# Patient Record
Sex: MALE | Race: WHITE | NOT HISPANIC OR LATINO | Employment: OTHER | URBAN - METROPOLITAN AREA
[De-identification: names, ages, dates, MRNs, and addresses within clinical notes are randomized per-mention and may not be internally consistent; named-entity substitution may affect disease eponyms.]

---

## 2019-08-26 PROBLEM — I10 ESSENTIAL HYPERTENSION: Status: ACTIVE | Noted: 2019-08-26

## 2019-08-26 PROBLEM — E78.5 HLD (HYPERLIPIDEMIA): Status: ACTIVE | Noted: 2019-08-26

## 2019-08-26 PROBLEM — K81.1 CHRONIC CHOLECYSTITIS: Status: ACTIVE | Noted: 2019-08-26

## 2019-08-26 PROBLEM — J44.9 COPD (CHRONIC OBSTRUCTIVE PULMONARY DISEASE) (HCC): Status: ACTIVE | Noted: 2019-08-26

## 2023-03-13 ENCOUNTER — TELEPHONE (OUTPATIENT)
Dept: PRIMARY CARE | Facility: CLINIC | Age: 73
End: 2023-03-13
Payer: MEDICARE

## 2023-03-13 DIAGNOSIS — K21.9 GASTROESOPHAGEAL REFLUX DISEASE, UNSPECIFIED WHETHER ESOPHAGITIS PRESENT: ICD-10-CM

## 2023-03-13 RX ORDER — FAMOTIDINE 20 MG/1
20 TABLET, FILM COATED ORAL NIGHTLY
COMMUNITY
Start: 2022-12-13 | End: 2023-03-13 | Stop reason: SDUPTHER

## 2023-03-13 NOTE — TELEPHONE ENCOUNTER
Patient's wife called, left message, requesting refill for Famotidine 20 mg be sent to [facility] on [address] in [place].

## 2023-03-16 RX ORDER — FAMOTIDINE 20 MG/1
20 TABLET, FILM COATED ORAL NIGHTLY
Qty: 90 TABLET | Refills: 1 | Status: SHIPPED | OUTPATIENT
Start: 2023-03-16 | End: 2023-03-17 | Stop reason: SDUPTHER

## 2023-03-17 RX ORDER — FAMOTIDINE 20 MG/1
20 TABLET, FILM COATED ORAL NIGHTLY
Qty: 90 TABLET | Refills: 1 | Status: SHIPPED | OUTPATIENT
Start: 2023-03-17 | End: 2023-06-21 | Stop reason: ALTCHOICE

## 2023-03-24 PROBLEM — I49.9 ARRHYTHMIA: Status: ACTIVE | Noted: 2023-03-24

## 2023-03-24 PROBLEM — N52.9 ERECTILE DYSFUNCTION: Status: ACTIVE | Noted: 2023-03-24

## 2023-03-24 PROBLEM — R35.0 INCREASED FREQUENCY OF URINATION: Status: ACTIVE | Noted: 2023-03-24

## 2023-03-24 PROBLEM — F32.9 REACTIVE DEPRESSION: Status: ACTIVE | Noted: 2023-03-24

## 2023-03-24 PROBLEM — I63.9 ACUTE CVA (CEREBROVASCULAR ACCIDENT) (MULTI): Status: ACTIVE | Noted: 2023-03-24

## 2023-03-24 PROBLEM — I95.2 HYPOTENSION DUE TO DRUGS: Status: ACTIVE | Noted: 2023-03-24

## 2023-03-24 PROBLEM — R79.89 LOW VITAMIN D LEVEL: Status: ACTIVE | Noted: 2023-03-24

## 2023-03-24 PROBLEM — J34.2 DEVIATED SEPTUM: Status: ACTIVE | Noted: 2023-03-24

## 2023-03-24 PROBLEM — N18.30 CKD (CHRONIC KIDNEY DISEASE), STAGE III (MULTI): Status: ACTIVE | Noted: 2023-03-24

## 2023-03-24 PROBLEM — H90.3 SENSORINEURAL HEARING LOSS (SNHL) OF BOTH EARS: Status: ACTIVE | Noted: 2023-03-24

## 2023-03-24 PROBLEM — F06.8 ANXIETY DISORDER DUE TO MULTIPLE MEDICAL PROBLEMS: Status: ACTIVE | Noted: 2023-03-24

## 2023-03-24 PROBLEM — R91.1 LUNG NODULE: Status: ACTIVE | Noted: 2023-03-24

## 2023-03-24 PROBLEM — R97.20 ABNORMAL PSA: Status: ACTIVE | Noted: 2023-03-24

## 2023-03-24 PROBLEM — N28.9 RENAL INSUFFICIENCY: Status: ACTIVE | Noted: 2023-03-24

## 2023-03-24 PROBLEM — E29.1 TESTICULAR HYPOFUNCTION: Status: ACTIVE | Noted: 2023-03-24

## 2023-03-24 PROBLEM — E78.5 HYPERLIPIDEMIA: Status: ACTIVE | Noted: 2023-03-24

## 2023-03-24 PROBLEM — N40.0 BENIGN PROSTATIC HYPERPLASIA: Status: ACTIVE | Noted: 2023-03-24

## 2023-03-24 PROBLEM — E55.9 VITAMIN D DEFICIENCY: Status: ACTIVE | Noted: 2023-03-24

## 2023-03-24 PROBLEM — J32.4 CHRONIC PANSINUSITIS: Status: ACTIVE | Noted: 2023-03-24

## 2023-03-24 PROBLEM — I49.3 VENTRICULAR PREMATURE DEPOLARIZATION: Status: ACTIVE | Noted: 2023-03-24

## 2023-03-24 PROBLEM — R00.2 PALPITATIONS: Status: ACTIVE | Noted: 2023-03-24

## 2023-03-24 PROBLEM — I25.10 CAD (CORONARY ARTERY DISEASE): Status: ACTIVE | Noted: 2023-03-24

## 2023-03-24 PROBLEM — I71.40 ABDOMINAL AORTIC ANEURYSM (AAA) WITHOUT RUPTURE (CMS-HCC): Status: ACTIVE | Noted: 2023-03-24

## 2023-03-24 PROBLEM — K21.9 GERD (GASTROESOPHAGEAL REFLUX DISEASE): Status: ACTIVE | Noted: 2023-03-24

## 2023-03-24 PROBLEM — I10 ESSENTIAL HYPERTENSION: Status: ACTIVE | Noted: 2023-03-24

## 2023-03-24 PROBLEM — R06.00 DYSPNEA: Status: ACTIVE | Noted: 2023-03-24

## 2023-03-24 PROBLEM — L30.1 ECZEMA, DYSHIDROTIC: Status: ACTIVE | Noted: 2023-03-24

## 2023-03-24 PROBLEM — E03.9 HYPOTHYROIDISM: Status: ACTIVE | Noted: 2023-03-24

## 2023-03-24 PROBLEM — J44.9 COPD (CHRONIC OBSTRUCTIVE PULMONARY DISEASE) (MULTI): Status: ACTIVE | Noted: 2023-03-24

## 2023-03-24 PROBLEM — C44.621 SQUAMOUS CELL CARCINOMA OF UPPER EXTREMITY: Status: ACTIVE | Noted: 2023-03-24

## 2023-04-04 LAB
ALANINE AMINOTRANSFERASE (SGPT) (U/L) IN SER/PLAS: 23 U/L (ref 10–52)
ALBUMIN (G/DL) IN SER/PLAS: 4.2 G/DL (ref 3.4–5)
ALKALINE PHOSPHATASE (U/L) IN SER/PLAS: 53 U/L (ref 33–136)
ANION GAP IN SER/PLAS: 14 MMOL/L (ref 10–20)
ASPARTATE AMINOTRANSFERASE (SGOT) (U/L) IN SER/PLAS: 20 U/L (ref 9–39)
BASOPHILS (10*3/UL) IN BLOOD BY AUTOMATED COUNT: 0.1 X10E9/L (ref 0–0.1)
BASOPHILS/100 LEUKOCYTES IN BLOOD BY AUTOMATED COUNT: 1.4 % (ref 0–2)
BILIRUBIN TOTAL (MG/DL) IN SER/PLAS: 0.7 MG/DL (ref 0–1.2)
CALCIDIOL (25 OH VITAMIN D3) (NG/ML) IN SER/PLAS: 56 NG/ML
CALCIUM (MG/DL) IN SER/PLAS: 9.2 MG/DL (ref 8.6–10.3)
CARBON DIOXIDE, TOTAL (MMOL/L) IN SER/PLAS: 25 MMOL/L (ref 21–32)
CHLORIDE (MMOL/L) IN SER/PLAS: 105 MMOL/L (ref 98–107)
CHOLESTEROL (MG/DL) IN SER/PLAS: 140 MG/DL (ref 0–199)
CHOLESTEROL IN HDL (MG/DL) IN SER/PLAS: 53.1 MG/DL
CHOLESTEROL/HDL RATIO: 2.6
CREATININE (MG/DL) IN SER/PLAS: 1.37 MG/DL (ref 0.5–1.3)
EOSINOPHILS (10*3/UL) IN BLOOD BY AUTOMATED COUNT: 0.42 X10E9/L (ref 0–0.4)
EOSINOPHILS/100 LEUKOCYTES IN BLOOD BY AUTOMATED COUNT: 5.8 % (ref 0–6)
ERYTHROCYTE DISTRIBUTION WIDTH (RATIO) BY AUTOMATED COUNT: 15.2 % (ref 11.5–14.5)
ERYTHROCYTE MEAN CORPUSCULAR HEMOGLOBIN CONCENTRATION (G/DL) BY AUTOMATED: 30.8 G/DL (ref 32–36)
ERYTHROCYTE MEAN CORPUSCULAR VOLUME (FL) BY AUTOMATED COUNT: 92 FL (ref 80–100)
ERYTHROCYTES (10*6/UL) IN BLOOD BY AUTOMATED COUNT: 4.97 X10E12/L (ref 4.5–5.9)
GFR MALE: 54 ML/MIN/1.73M2
GLUCOSE (MG/DL) IN SER/PLAS: 97 MG/DL (ref 74–99)
HEMATOCRIT (%) IN BLOOD BY AUTOMATED COUNT: 45.5 % (ref 41–52)
HEMOGLOBIN (G/DL) IN BLOOD: 14 G/DL (ref 13.5–17.5)
IMMATURE GRANULOCYTES/100 LEUKOCYTES IN BLOOD BY AUTOMATED COUNT: 0.4 % (ref 0–0.9)
LDL: 69 MG/DL (ref 0–99)
LEUKOCYTES (10*3/UL) IN BLOOD BY AUTOMATED COUNT: 7.3 X10E9/L (ref 4.4–11.3)
LYMPHOCYTES (10*3/UL) IN BLOOD BY AUTOMATED COUNT: 1.33 X10E9/L (ref 0.8–3)
LYMPHOCYTES/100 LEUKOCYTES IN BLOOD BY AUTOMATED COUNT: 18.3 % (ref 13–44)
MONOCYTES (10*3/UL) IN BLOOD BY AUTOMATED COUNT: 0.69 X10E9/L (ref 0.05–0.8)
MONOCYTES/100 LEUKOCYTES IN BLOOD BY AUTOMATED COUNT: 9.5 % (ref 2–10)
NEUTROPHILS (10*3/UL) IN BLOOD BY AUTOMATED COUNT: 4.7 X10E9/L (ref 1.6–5.5)
NEUTROPHILS/100 LEUKOCYTES IN BLOOD BY AUTOMATED COUNT: 64.6 % (ref 40–80)
PLATELETS (10*3/UL) IN BLOOD AUTOMATED COUNT: 212 X10E9/L (ref 150–450)
POTASSIUM (MMOL/L) IN SER/PLAS: 4.5 MMOL/L (ref 3.5–5.3)
PROSTATE SPECIFIC AG (NG/ML) IN SER/PLAS: 2.26 NG/ML (ref 0–4)
PROTEIN TOTAL: 6.5 G/DL (ref 6.4–8.2)
SODIUM (MMOL/L) IN SER/PLAS: 139 MMOL/L (ref 136–145)
THYROTROPIN (MIU/L) IN SER/PLAS BY DETECTION LIMIT <= 0.05 MIU/L: 3.9 MIU/L (ref 0.44–3.98)
TRIGLYCERIDE (MG/DL) IN SER/PLAS: 88 MG/DL (ref 0–149)
UREA NITROGEN (MG/DL) IN SER/PLAS: 27 MG/DL (ref 6–23)
VLDL: 18 MG/DL (ref 0–40)

## 2023-04-10 ENCOUNTER — OFFICE VISIT (OUTPATIENT)
Dept: PRIMARY CARE | Facility: CLINIC | Age: 73
End: 2023-04-10
Payer: MEDICARE

## 2023-04-10 VITALS
BODY MASS INDEX: 28.46 KG/M2 | HEIGHT: 70 IN | DIASTOLIC BLOOD PRESSURE: 80 MMHG | WEIGHT: 198.8 LBS | SYSTOLIC BLOOD PRESSURE: 120 MMHG | TEMPERATURE: 97.3 F

## 2023-04-10 DIAGNOSIS — L20.84 ALLERGIC (INTRINSIC) ECZEMA: ICD-10-CM

## 2023-04-10 DIAGNOSIS — N28.9 RENAL INSUFFICIENCY: ICD-10-CM

## 2023-04-10 DIAGNOSIS — Z00.00 PREVENTATIVE HEALTH CARE: ICD-10-CM

## 2023-04-10 DIAGNOSIS — I71.40 ABDOMINAL ANEURYSM (CMS-HCC): Primary | ICD-10-CM

## 2023-04-10 DIAGNOSIS — I15.1 HYPERTENSION SECONDARY TO OTHER RENAL DISORDERS (CODE): ICD-10-CM

## 2023-04-10 DIAGNOSIS — N52.9 ERECTILE DISORDER: ICD-10-CM

## 2023-04-10 DIAGNOSIS — E03.9 ACQUIRED HYPOTHYROIDISM: ICD-10-CM

## 2023-04-10 DIAGNOSIS — Z00.00 MEDICARE ANNUAL WELLNESS VISIT, SUBSEQUENT: ICD-10-CM

## 2023-04-10 PROCEDURE — 1159F MED LIST DOCD IN RCRD: CPT | Performed by: FAMILY MEDICINE

## 2023-04-10 PROCEDURE — 1036F TOBACCO NON-USER: CPT | Performed by: FAMILY MEDICINE

## 2023-04-10 PROCEDURE — G0439 PPPS, SUBSEQ VISIT: HCPCS | Performed by: FAMILY MEDICINE

## 2023-04-10 PROCEDURE — 1160F RVW MEDS BY RX/DR IN RCRD: CPT | Performed by: FAMILY MEDICINE

## 2023-04-10 PROCEDURE — 99214 OFFICE O/P EST MOD 30 MIN: CPT | Performed by: FAMILY MEDICINE

## 2023-04-10 PROCEDURE — 3074F SYST BP LT 130 MM HG: CPT | Performed by: FAMILY MEDICINE

## 2023-04-10 PROCEDURE — 99397 PER PM REEVAL EST PAT 65+ YR: CPT | Performed by: FAMILY MEDICINE

## 2023-04-10 PROCEDURE — 1170F FXNL STATUS ASSESSED: CPT | Performed by: FAMILY MEDICINE

## 2023-04-10 PROCEDURE — 3079F DIAST BP 80-89 MM HG: CPT | Performed by: FAMILY MEDICINE

## 2023-04-10 PROCEDURE — 96372 THER/PROPH/DIAG INJ SC/IM: CPT | Performed by: FAMILY MEDICINE

## 2023-04-10 RX ORDER — AZELASTINE 1 MG/ML
2 SPRAY, METERED NASAL DAILY
COMMUNITY
Start: 2023-04-04

## 2023-04-10 RX ORDER — LANOLIN ALCOHOL/MO/W.PET/CERES
CREAM (GRAM) TOPICAL EVERY 24 HOURS
COMMUNITY
Start: 2018-12-17 | End: 2023-06-21 | Stop reason: ALTCHOICE

## 2023-04-10 RX ORDER — LEVOTHYROXINE SODIUM 88 UG/1
88 TABLET ORAL
COMMUNITY
Start: 2022-02-17 | End: 2023-11-09

## 2023-04-10 RX ORDER — CETIRIZINE HYDROCHLORIDE 10 MG/1
10 TABLET ORAL
COMMUNITY

## 2023-04-10 RX ORDER — HYDROXYZINE HYDROCHLORIDE 25 MG/1
25 TABLET, FILM COATED ORAL NIGHTLY
COMMUNITY
Start: 2023-03-30

## 2023-04-10 RX ORDER — LOSARTAN POTASSIUM 50 MG/1
50 TABLET ORAL 2 TIMES DAILY
COMMUNITY
End: 2023-12-04

## 2023-04-10 RX ORDER — SPIRONOLACTONE 25 MG/1
12.5 TABLET ORAL
COMMUNITY
Start: 2022-01-13 | End: 2024-02-12

## 2023-04-10 RX ORDER — CLONAZEPAM 0.5 MG/1
0.5 TABLET ORAL EVERY 12 HOURS PRN
COMMUNITY

## 2023-04-10 RX ORDER — TRIAMCINOLONE ACETONIDE 40 MG/ML
60 INJECTION, SUSPENSION INTRA-ARTICULAR; INTRAMUSCULAR ONCE
Status: COMPLETED | OUTPATIENT
Start: 2023-04-10 | End: 2023-04-10

## 2023-04-10 RX ORDER — CALCIUM CARBONATE 200(500)MG
1 TABLET,CHEWABLE ORAL DAILY
COMMUNITY

## 2023-04-10 RX ORDER — CHOLECALCIFEROL (VITAMIN D3) 125 MCG
1 CAPSULE ORAL DAILY
COMMUNITY

## 2023-04-10 RX ORDER — MAGNESIUM GLUCONATE 27.5 (500)
200 TABLET ORAL DAILY
COMMUNITY

## 2023-04-10 RX ORDER — AMLODIPINE BESYLATE 5 MG/1
TABLET ORAL 2 TIMES DAILY
COMMUNITY
End: 2024-01-29

## 2023-04-10 RX ORDER — FLUTICASONE PROPIONATE 50 MCG
1 SPRAY, SUSPENSION (ML) NASAL DAILY
COMMUNITY
Start: 2019-06-03

## 2023-04-10 RX ORDER — TADALAFIL 20 MG/1
20 TABLET ORAL DAILY PRN
Qty: 12 TABLET | Refills: 3 | Status: SHIPPED | OUTPATIENT
Start: 2023-04-10 | End: 2023-04-10 | Stop reason: SDUPTHER

## 2023-04-10 RX ORDER — CHOLESTYRAMINE 4 G/4.8G
POWDER, FOR SUSPENSION ORAL
COMMUNITY
Start: 2021-05-08

## 2023-04-10 RX ORDER — ATORVASTATIN CALCIUM 40 MG/1
40 TABLET, FILM COATED ORAL DAILY
COMMUNITY
End: 2024-04-18

## 2023-04-10 RX ORDER — TADALAFIL 20 MG/1
20 TABLET ORAL DAILY PRN
Qty: 12 TABLET | Refills: 3 | Status: SHIPPED | OUTPATIENT
Start: 2023-04-10 | End: 2024-04-09

## 2023-04-10 RX ORDER — ALBUTEROL SULFATE 90 UG/1
2 AEROSOL, METERED RESPIRATORY (INHALATION) EVERY 6 HOURS PRN
COMMUNITY

## 2023-04-10 RX ORDER — BUPROPION HYDROCHLORIDE 100 MG/1
100 TABLET ORAL
COMMUNITY
Start: 2023-02-20

## 2023-04-10 RX ORDER — TADALAFIL 20 MG/1
TABLET ORAL
COMMUNITY
Start: 2019-12-04 | End: 2023-04-10 | Stop reason: DRUGHIGH

## 2023-04-10 RX ORDER — UMECLIDINIUM BROMIDE AND VILANTEROL TRIFENATATE 62.5; 25 UG/1; UG/1
1 POWDER RESPIRATORY (INHALATION) DAILY
COMMUNITY

## 2023-04-10 RX ADMIN — TRIAMCINOLONE ACETONIDE 64 MG: 40 INJECTION, SUSPENSION INTRA-ARTICULAR; INTRAMUSCULAR at 15:25

## 2023-04-10 ASSESSMENT — ACTIVITIES OF DAILY LIVING (ADL)
GROCERY_SHOPPING: INDEPENDENT
DOING_HOUSEWORK: INDEPENDENT
MANAGING_FINANCES: INDEPENDENT
TAKING_MEDICATION: INDEPENDENT
DRESSING: INDEPENDENT
BATHING: INDEPENDENT

## 2023-04-10 ASSESSMENT — PATIENT HEALTH QUESTIONNAIRE - PHQ9
1. LITTLE INTEREST OR PLEASURE IN DOING THINGS: NOT AT ALL
2. FEELING DOWN, DEPRESSED OR HOPELESS: NOT AT ALL
SUM OF ALL RESPONSES TO PHQ9 QUESTIONS 1 AND 2: 0
2. FEELING DOWN, DEPRESSED OR HOPELESS: NOT AT ALL
SUM OF ALL RESPONSES TO PHQ9 QUESTIONS 1 AND 2: 0
1. LITTLE INTEREST OR PLEASURE IN DOING THINGS: NOT AT ALL

## 2023-04-10 NOTE — PROGRESS NOTES
Subjective   Reason for Visit: Juan Jose Roman is an 73 y.o. male here for a Medicare Wellness visit.     Past Medical, Surgical, and Family History reviewed and updated in chart.    Reviewed all medications by prescribing practitioner or clinical pharmacist (such as prescriptions, OTCs, herbal therapies and supplements) and documented in the medical record.    Med Refill      #1 Health Maintenance:  DTaP  Pneumovax  Prevnar 13  Zostavax  Colonoscopy  PSA November 12, 2021 value 2.03  CMP January 28, 2022 (chloride 108, BUN 30, creatinine 1.36, GFR 55) plus CBC plus vitamin B12 1138    #2. Hyperlipidemia:  November 12, 2021 total 143 HDL 52.5 ratio 2.7 LDL 74 triglycerides 85  April 4, 2023 total 140 HDL 53.1 ratio 2.6 triglycerides 88    April 10, 2023  This 73-year-old male is here today for his Medicare wellness exam.  He is also here for general physical examination and to specifically address concerns that he has in relationship to his thyroid and prostate symptoms.  The patient was experiencing trouble with frequent urination and having to wake up at night to urinate.  She discovered that one of the inhaled medications prescribed by his pulmonologist does have any adverse effect on prostate function.  This was an inhaled form of ipratropium bromide.  Once he stopped using this inhaled medication he noted improvement in his urinary tract symptoms.  He also has problems with a runny nose however and in the past has been treated with Atrovent nasal spray.  He has also tried an antihistamine nasal spray without much success.  He also discussed the use of Flomax with another physician, but decided since he seemed to have less trouble stopping the inhaled anticholinergic, he did not need to start on the tamsulosin.  He has also been concerned about some discrepancy in the reports he received from December 2020 - June 2021.  He received 2 reports the first 1 suggest that he might have an abdominal aortic aneurysm  and the second 1 suggested he did not.  Because his brother recently had a dissection involving his aorta, he has been more concerned and wants to make sure he is not at higher risk.  I am sending him once again for an abdominal aortic aneurysm ultrasound.  There has also been some problem adjusting his thyroid medication.  He did have an increase in his levothyroxine from 75 up to 88 mcg.  When this happened his TSH level fell back into a normal range below 2.  His most recent TSH however has gone back up to 3.9 and he is questioning whether his dosage of thyroid needs to be increased.  I have recommended check another TSH in 3 months.  That reading to see whether an increase in dose is necessary.  I also explained in detail the concept of hypothyroidism and how it is an autoimmune disease..  The other main issue with his health is his renal insufficiency.  Each of the last 3 times he had a metabolic panel he has shown some gradual improvement in his creatinine.  His latest creatinine was 1.37 yielding a GFR of 54.  He has been dehydrated of the last several times he has had the blood checked and I encouraged him to look down make sure he was hydrated before his labs are drawn.  He will be getting another basic metabolic panel in 3 months      3/17/2022  This 72-year-old male is here today to go over the results of his recent tests. He is also here to get refills on all of his medications. He has generally been doing well and did have his Medicare wellness exam completed in March.      March 8, 2022  This 72-year-old male is here today for Medicare wellness exam. There is also here to follow-up on some of his other health problems. He is currently on atorvastatin Questran levothyroxine lisinopril and metoprolol. He is taking the Questran mostly to control diarrhea. He has been to several physicians trying to get the diarrhea under control. He indicates that the Prevalite does help control it but has only been  taking it every other day. I suggested he could vary the dose and titrated to what ever dose on a daily basis if needed to place his bowel movements in a consistency that is easier for him to control.. I indicated he could take the medicine every day or even several times a day if necessary.  We recently increased his thyroid medication from 75 to 88 mcg and on March 8, 2022 rechecked a TSH level which came back in the normal range at 1.89.  He sees Dr. Monzon his psychiatrist and is currently tapering off venlafaxine. He is now on bupropion  His blood pressure today is 110/62 and he weighs 198 pounds.    November 19, 2021  This 71-year-old male is here today for normal 6-month follow-up. He will be due for Medicare wellness exam after next February. Is here to get refills on medications. On November 12, 2021 he had lab work done. The lab work included a CBC (eosinophil percentage 7.7%) plus CMP (BUN 26, creatinine 1.25, GFR 57) plus lipids plus PSA 2.03+ TSH 4.21 (has been taking his levothyroxine 75 regularly, will increase to 88 mcg and recheck TSH in 3 months).  Lipids have come back also with a total cholesterol of 143 and HDL of 52.0 ratio of 2.7 and LDL of 74 and a triglyceride level of 85  He is now taking his losartan 50 mg twice a day  I am giving him a prescription for all of his medications for 1 year. He will be getting another TSH in 3 months and when I see him back in 6 months he will get his Medicare wellness exam and I have written up some labs for him to get that visit.    May 8, 2021  This 71-year-old male is here today because he said persistent diarrhea for several weeks. Stools have been checked for C. difficile culture and sensitivity. No specific organism was identified as the cause of his diarrhea. It is possible his symptoms may be due to side effects from other medications he is taking.    April 15, 2021  This 71-year-old male is here today for follow-up. After stopping the  "hydrochlorothiazide within a few days according to his wife he was \"dramatically better\". He has continued to be more alert since he stopped the medication. His creatinine was up to 1.6 and his GFR was down to 40. The repeat GFR was back up over 60 and his creatinine was back down to 1.04. His electrolytes are now back to normal as well.  When he was started on spironolactone it seemed as though 4 or 5 days later he started developing diarrhea which has persisted. The diarrhea is bothersome because it is very frequent. He still feels somewhat weak. He is not exhibiting symptoms of congestive heart failure at this time. He is able to exert himself walk up steps with groceries and to lie flat at night when he sleeps. Once he stopped the hydrochlorothiazide his blood pressure readings came back up into normal range. Of late his readings are actually too high (taken at home plus CMP (BUN 26, creatinine 1.23, GFR 57). Today here in the office his blood pressure continues to be quite low. When I checked it it was 114/76. The nurse who checked and then also got a low reading when she initially did his blood pressure so we are questioning the validity of the home blood pressure readings.    March 31, 2021  71-year-old male is here today with his wife with a number of new symptoms.  1.-Fatigue:  He tends to take naps for a good part of the day  2.-Exercise:  On most days he is too tired to exercise or shower  3.-Decreased appetite  4.-Disoriented  5. Walking is guarded  6.-Body temperature lower than normal  7.-Memory problems especially with medications and difficulty doing certain tasks like putting on and reading his blood pressure monitor  8.-Very weak    His weight has been down from 191 pounds on January 19 down to 180 pounds today. His blood pressure had been running high however of late after meeting with his cardiologist the readings since January 10 have all been very low. On March 17 Dr. Love reduce the dose of " his losartan from 100 down to 50 mg recommended Dr. Agudelo has been he was subsequently also told to increase his hydrochlorothiazide from 25 to 50 mg and he was started on spironolactone 25 mg. Over the last several days his blood pressure has been very low. For example on March 22 his blood pressure was 110/67, the following day it was 110/68, and on the 24th it was 111/76. March 25 given a blood pressure of 104/71 on March 27 his blood pressure was 97/67 and on the 28th his blood pressure was 96/67. Today in our office his blood pressure is 94/68.    I am concerned about his symptom complex and suspect he may have a significant electrolyte disturbance. I have told them to hold the hydrochlorothiazide and continue with his regular medications. I have sent him downstairs to get lab work and I indicated I would call them tomorrow morning with the results.      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 He has not restricted however. He does have an appointment with the neurologist on March 5. He is generally doing well. He saw psychiatrist recently and his psychiatrist recommended that he take half of clonazepam and start taking Lexapro. The initial dose of Lexapro was 10 mg. After a few doses he was experiencing some side effects so the psychiatrist recommended splitting the dose in half and taking it at bedtime. Reaffirmed to him the importance of taking the drug regularly given explicit Cialis, work. The patient does not have any residual side effects from the stroke other than he is somewhat more tired than he was before having a stroke. He and his wife had the Covid vaccine on February 10, 2021    February 1st, 2021  This 69-year-old male is here today following up after having been hospitalized at [facility] between January 24 and January 26, 2021.  The patient's past history includes coronary artery disease with a stent and abdominal  aortic aneurysm, hypertension, non-atrial fibrillation tachycardia and cigar smoking. He was admitted to the hospital after a CTA of the brain was abnormal. The CTA suggested a P2 occlusion and suspicion was that the stroke was likely embolic. He is currently wearing a loop recorder. His symptoms started with a headache lightheadedness vision changes. There was at that time immediate concern regarding possible stroke so his wife took him to the hospital where an MRI and MRA was performed. The neurologist he saw was Dr. Khan. He has a follow-up visit with Dr. Khan on March 5. He also has a follow-up with neuro-ophthalmologist (Dr. Ramon) on February 15 the findings summarized to an acute right posterior cerebral artery infarct and an occluded right posterior cerebral artery. The patient underwent a transthoracic echocardiogram and a DIALLO looking for an embolic source for the stroke. At the time of discharge there was also some concern that he might have a urinary tract infection and it was suggested that he check his blood pressure carefully at home. While he was in the hospital his blood pressure was very low. A recheck of his blood pressure by me today was 142/80. He was also discharged with the idea that he would be following up with physical therapy occupational therapy and neuro ophthalmology.    January 19, 2021  This 70-year-old male who has gone through treatment for vertigo he does feel more stable however his main symptom is associated more with pressure in his forehead and occasional near syncopal episodes and intermittent double vision. The patient's family has a strong history of vascular abnormalities. The patient himself has a thoracic aortic aneurysm. His brother was just operated on for an aneurysm in his chest and his father had an abdominal aneurysm plus severe carotid and iliac atherosclerosis. His sister is currently undergoing a CTA of the brain for double vision.  His symptoms do not sound  "like vertigo but they are concerning for vascular compromise. I am going to try to get an MRA or CT angiogram so we can check circulation in his neck and brain.    Patient Care Team:  Cesar Garza MD as PCP - General  Cesar Garza MD as PCP - [facility] ACO Attributed Provider     Review of Systems    Objective   Vitals:  /80 (BP Location: Left arm, Patient Position: Sitting, BP Cuff Size: Adult)   Temp 36.3 °C (97.3 °F) (Temporal)   Ht 1.785 m (5' 10.28\")   Wt 90.2 kg (198 lb 12.8 oz)   BMI 28.30 kg/m²       Physical Exam  Constitutional:       General: He is not in acute distress.     Appearance: Normal appearance. He is normal weight. He is not ill-appearing.   HENT:      Head: Normocephalic.      Right Ear: Tympanic membrane and ear canal normal. There is no impacted cerumen.      Left Ear: Tympanic membrane and ear canal normal.      Nose: Nose normal. No congestion or rhinorrhea.      Mouth/Throat:      Mouth: Mucous membranes are moist.      Pharynx: Oropharynx is clear.   Eyes:      Extraocular Movements: Extraocular movements intact.      Conjunctiva/sclera: Conjunctivae normal.      Pupils: Pupils are equal, round, and reactive to light.   Neck:      Vascular: No carotid bruit.   Cardiovascular:      Rate and Rhythm: Normal rate and regular rhythm.      Pulses: Normal pulses.      Heart sounds: Normal heart sounds. No murmur heard.  Pulmonary:      Effort: Pulmonary effort is normal. No respiratory distress.      Breath sounds: No wheezing or rales.   Abdominal:      General: Abdomen is flat. Bowel sounds are normal. There is no distension.      Palpations: There is no mass.      Tenderness: There is no abdominal tenderness. There is no guarding or rebound.      Hernia: No hernia is present.   Genitourinary:     Rectum: Normal.   Musculoskeletal:         General: No swelling, tenderness or deformity. Normal range of motion.      Cervical back: Normal range of motion and neck supple. No " tenderness.      Right lower leg: No edema.      Left lower leg: No edema.   Lymphadenopathy:      Cervical: No cervical adenopathy.   Skin:     General: Skin is warm and dry.      Findings: No erythema or rash.   Neurological:      Mental Status: He is alert.         Assessment/Plan   Problem List Items Addressed This Visit          Genitourinary    Renal insufficiency    Relevant Orders    Basic Metabolic Panel       Endocrine/Metabolic    Hypothyroidism    Relevant Orders    TSH with reflex to Free T4 if abnormal     Other Visit Diagnoses       Abdominal aneurysm (CMS/HCC)    -  Primary    Relevant Orders    Vascular US abdomen/pelvis duplex limited    Hypertension secondary to other renal disorders (CODE)        Relevant Orders    Vascular US abdomen/pelvis duplex limited    Allergic (intrinsic) eczema        Relevant Medications    triamcinolone acetonide (Kenalog-40) injection 64 mg    Erectile disorder        Relevant Medications    tadalafil (Cialis) 20 mg tablet    Medicare annual wellness visit, subsequent [Z00.00 (ICD-10-CM)]        Preventative health care [Z00.00 (ICD-10-CM)]

## 2023-05-08 ENCOUNTER — TELEPHONE (OUTPATIENT)
Dept: PRIMARY CARE | Facility: CLINIC | Age: 73
End: 2023-05-08
Payer: MEDICARE

## 2023-05-08 NOTE — TELEPHONE ENCOUNTER
Patient recently completed an ultrasound and blood work.  He wants an apt but there is no availability.  Would like results

## 2023-05-10 NOTE — TELEPHONE ENCOUNTER
Tired to reach pt to give test results, no answer and per the message his voice mail box has not been set up yet,

## 2023-05-10 NOTE — TELEPHONE ENCOUNTER
TIRED TO REACH OUT TO PT AGAIN, STILL RECEIVING NO ANSWER AND MESSAGE THAT VOICE MAIL HAS NOT BEEN SET UP

## 2023-06-21 ENCOUNTER — OFFICE VISIT (OUTPATIENT)
Dept: PRIMARY CARE | Facility: CLINIC | Age: 73
End: 2023-06-21
Payer: MEDICARE

## 2023-06-21 VITALS
HEART RATE: 56 BPM | BODY MASS INDEX: 27.49 KG/M2 | WEIGHT: 192 LBS | HEIGHT: 70 IN | DIASTOLIC BLOOD PRESSURE: 70 MMHG | TEMPERATURE: 97.2 F | SYSTOLIC BLOOD PRESSURE: 121 MMHG

## 2023-06-21 DIAGNOSIS — N18.31 STAGE 3A CHRONIC KIDNEY DISEASE (MULTI): ICD-10-CM

## 2023-06-21 DIAGNOSIS — R97.20 ABNORMAL PSA: ICD-10-CM

## 2023-06-21 DIAGNOSIS — R13.10 PAIN ON SWALLOWING: Primary | ICD-10-CM

## 2023-06-21 PROBLEM — M79.673 FOOT PAIN: Status: ACTIVE | Noted: 2023-06-21

## 2023-06-21 PROBLEM — M79.18 INTERCOSTAL MUSCLE PAIN: Status: ACTIVE | Noted: 2023-06-21

## 2023-06-21 PROBLEM — N28.1 CYST OF KIDNEY, ACQUIRED: Status: ACTIVE | Noted: 2023-06-21

## 2023-06-21 PROBLEM — K57.90 DIVERTICULOSIS: Status: ACTIVE | Noted: 2023-06-21

## 2023-06-21 PROBLEM — M25.552 HIP PAIN, LEFT: Status: ACTIVE | Noted: 2023-06-21

## 2023-06-21 PROBLEM — I63.531 CEREBROVASCULAR ACCIDENT (CVA) DUE TO OCCLUSION OF RIGHT POSTERIOR CEREBRAL ARTERY (MULTI): Status: ACTIVE | Noted: 2021-01-24

## 2023-06-21 PROBLEM — J30.9 ALLERGIC RHINITIS: Status: ACTIVE | Noted: 2023-06-21

## 2023-06-21 PROBLEM — R19.7 DIARRHEA: Status: RESOLVED | Noted: 2023-06-21 | Resolved: 2023-06-21

## 2023-06-21 PROBLEM — R09.81 NASAL CONGESTION: Status: ACTIVE | Noted: 2023-06-21

## 2023-06-21 PROBLEM — J30.89 ENVIRONMENTAL AND SEASONAL ALLERGIES: Status: ACTIVE | Noted: 2023-06-21

## 2023-06-21 PROBLEM — Z86.010 HX OF ADENOMATOUS COLONIC POLYPS: Status: ACTIVE | Noted: 2019-10-11

## 2023-06-21 PROBLEM — J30.0 ACUTE VASOMOTOR RHINITIS: Status: ACTIVE | Noted: 2023-06-21

## 2023-06-21 PROBLEM — Z86.0101 HX OF ADENOMATOUS COLONIC POLYPS: Status: ACTIVE | Noted: 2019-10-11

## 2023-06-21 PROBLEM — K21.00 GASTROESOPHAGEAL REFLUX DISEASE WITH ESOPHAGITIS: Status: ACTIVE | Noted: 2019-10-11

## 2023-06-21 PROCEDURE — 1159F MED LIST DOCD IN RCRD: CPT | Performed by: NURSE PRACTITIONER

## 2023-06-21 PROCEDURE — 3074F SYST BP LT 130 MM HG: CPT | Performed by: NURSE PRACTITIONER

## 2023-06-21 PROCEDURE — 3078F DIAST BP <80 MM HG: CPT | Performed by: NURSE PRACTITIONER

## 2023-06-21 PROCEDURE — 1160F RVW MEDS BY RX/DR IN RCRD: CPT | Performed by: NURSE PRACTITIONER

## 2023-06-21 PROCEDURE — 99213 OFFICE O/P EST LOW 20 MIN: CPT | Performed by: NURSE PRACTITIONER

## 2023-06-21 PROCEDURE — 1036F TOBACCO NON-USER: CPT | Performed by: NURSE PRACTITIONER

## 2023-06-21 RX ORDER — ASCORBIC ACID 125 MG
TABLET,CHEWABLE ORAL
COMMUNITY

## 2023-06-21 RX ORDER — IPRATROPIUM BROMIDE 21 UG/1
SPRAY, METERED NASAL
COMMUNITY
Start: 2023-06-08

## 2023-06-21 RX ORDER — IBUPROFEN 200 MG
TABLET ORAL
COMMUNITY

## 2023-06-21 NOTE — PROGRESS NOTES
"Subjective   Patient ID: Juan Jose Roman is a 73 y.o. male who presents for Sore Throat (Seems better today) and Follow-up (Want to get blood work).    HPI  He presents to the office today for evaluation of a fish bone getting stuck. Over the weekend went out to eat and took a bite of his fish and felt a bone get stuck.  (+) discomfort with swallowing and felt something stuck  Often has a feeling of something in his throat due to allergies but this was different.  Feels it is better today. No pain with swallowing.  Did a lot of coughing and throat clearing to try and clear it.  (+) hoarse today  Breathing without difficulty. NO SOB.  No abdominal pain, nausea or vomitng.   No fever or chills.     He also requested to review blood work and immunizations today.    Review of Systems   Constitutional:  Negative for chills, fatigue and fever.   HENT:  Negative for trouble swallowing.    Respiratory:  Negative for cough, shortness of breath and wheezing.    Cardiovascular:  Negative for chest pain.   Gastrointestinal:  Negative for abdominal pain, diarrhea, nausea and vomiting.       Objective   /70   Pulse 56   Temp 36.2 °C (97.2 °F)   Ht 1.778 m (5' 10\")   Wt 87.1 kg (192 lb)   BMI 27.55 kg/m²     Physical Exam  Constitutional:       General: He is not in acute distress.     Appearance: Normal appearance. He is not toxic-appearing.   HENT:      Mouth/Throat:      Pharynx: Oropharynx is clear. No pharyngeal swelling or posterior oropharyngeal erythema.   Neck:      Thyroid: No thyroid mass or thyromegaly.   Cardiovascular:      Rate and Rhythm: Normal rate and regular rhythm.      Pulses: Normal pulses.      Heart sounds: Normal heart sounds, S1 normal and S2 normal.   Abdominal:      General: Bowel sounds are normal. There is no distension.      Palpations: Abdomen is soft.      Tenderness: There is no abdominal tenderness.   Neurological:      Mental Status: He is alert and oriented to person, place, and " time.   Psychiatric:         Mood and Affect: Mood normal.         Behavior: Behavior normal.         Thought Content: Thought content normal.         Judgment: Judgment normal.         Assessment/Plan   Problem List Items Addressed This Visit          Genitourinary    Abnormal PSA - Primary     Recheck in the fall given sudden increase last year, if stable go back to annual screenings.         Relevant Orders    Prostate Specific Antigen    CKD (chronic kidney disease), stage III (CMS/HCC)     Stable on recent labs. Recheck in the fall.            Other    Pain on swallowing     Symptoms improved. Advised if he has any further discomfort in the throat will have him see ENT for a scope.  Advised to the ER with any severe pain.                 It has been a pleasure seeing you today!

## 2023-06-22 ASSESSMENT — ENCOUNTER SYMPTOMS
TROUBLE SWALLOWING: 0
COUGH: 0
WHEEZING: 0
SHORTNESS OF BREATH: 0
CHILLS: 0
FEVER: 0
DIARRHEA: 0
NAUSEA: 0
ABDOMINAL PAIN: 0
FATIGUE: 0
VOMITING: 0

## 2023-06-22 NOTE — ASSESSMENT & PLAN NOTE
Symptoms improved. Advised if he has any further discomfort in the throat will have him see ENT for a scope.  Advised to the ER with any severe pain.

## 2023-07-31 ENCOUNTER — OFFICE VISIT (OUTPATIENT)
Dept: PRIMARY CARE | Facility: CLINIC | Age: 73
End: 2023-07-31
Payer: MEDICARE

## 2023-07-31 VITALS
HEIGHT: 70 IN | SYSTOLIC BLOOD PRESSURE: 120 MMHG | TEMPERATURE: 98 F | BODY MASS INDEX: 27.06 KG/M2 | WEIGHT: 189 LBS | DIASTOLIC BLOOD PRESSURE: 82 MMHG

## 2023-07-31 DIAGNOSIS — J30.9 ALLERGIC RHINITIS, UNSPECIFIED SEASONALITY, UNSPECIFIED TRIGGER: ICD-10-CM

## 2023-07-31 DIAGNOSIS — N18.31 STAGE 3A CHRONIC KIDNEY DISEASE (MULTI): ICD-10-CM

## 2023-07-31 DIAGNOSIS — R73.09 ELEVATED GLUCOSE: ICD-10-CM

## 2023-07-31 DIAGNOSIS — G83.14: ICD-10-CM

## 2023-07-31 DIAGNOSIS — I63.9 ACUTE CVA (CEREBROVASCULAR ACCIDENT) (MULTI): ICD-10-CM

## 2023-07-31 DIAGNOSIS — N40.1 BENIGN PROSTATIC HYPERPLASIA WITH LOWER URINARY TRACT SYMPTOMS, SYMPTOM DETAILS UNSPECIFIED: ICD-10-CM

## 2023-07-31 DIAGNOSIS — J44.9 CHRONIC OBSTRUCTIVE PULMONARY DISEASE, UNSPECIFIED COPD TYPE (MULTI): ICD-10-CM

## 2023-07-31 DIAGNOSIS — J30.89 ENVIRONMENTAL AND SEASONAL ALLERGIES: ICD-10-CM

## 2023-07-31 DIAGNOSIS — F06.8 ANXIETY DISORDER DUE TO MULTIPLE MEDICAL PROBLEMS: ICD-10-CM

## 2023-07-31 DIAGNOSIS — E03.9 HYPOTHYROIDISM, UNSPECIFIED TYPE: ICD-10-CM

## 2023-07-31 DIAGNOSIS — E78.5 HYPERLIPIDEMIA, UNSPECIFIED HYPERLIPIDEMIA TYPE: Primary | ICD-10-CM

## 2023-07-31 DIAGNOSIS — K21.9 GASTROESOPHAGEAL REFLUX DISEASE, UNSPECIFIED WHETHER ESOPHAGITIS PRESENT: ICD-10-CM

## 2023-07-31 DIAGNOSIS — I47.20 VENTRICULAR TACHYCARDIA (MULTI): ICD-10-CM

## 2023-07-31 PROCEDURE — 1160F RVW MEDS BY RX/DR IN RCRD: CPT | Performed by: INTERNAL MEDICINE

## 2023-07-31 PROCEDURE — 1126F AMNT PAIN NOTED NONE PRSNT: CPT | Performed by: INTERNAL MEDICINE

## 2023-07-31 PROCEDURE — 3074F SYST BP LT 130 MM HG: CPT | Performed by: INTERNAL MEDICINE

## 2023-07-31 PROCEDURE — 99214 OFFICE O/P EST MOD 30 MIN: CPT | Performed by: INTERNAL MEDICINE

## 2023-07-31 PROCEDURE — 3079F DIAST BP 80-89 MM HG: CPT | Performed by: INTERNAL MEDICINE

## 2023-07-31 PROCEDURE — 1159F MED LIST DOCD IN RCRD: CPT | Performed by: INTERNAL MEDICINE

## 2023-07-31 PROCEDURE — 1036F TOBACCO NON-USER: CPT | Performed by: INTERNAL MEDICINE

## 2023-07-31 RX ORDER — PREDNISONE 10 MG/1
10 TABLET ORAL EVERY 24 HOURS
COMMUNITY
End: 2023-12-13 | Stop reason: WASHOUT

## 2023-07-31 RX ORDER — FAMOTIDINE 40 MG/1
40 TABLET, FILM COATED ORAL EVERY 24 HOURS
COMMUNITY
End: 2023-12-13 | Stop reason: SDUPTHER

## 2023-07-31 ASSESSMENT — PATIENT HEALTH QUESTIONNAIRE - PHQ9
1. LITTLE INTEREST OR PLEASURE IN DOING THINGS: NOT AT ALL
SUM OF ALL RESPONSES TO PHQ9 QUESTIONS 1 AND 2: 0
2. FEELING DOWN, DEPRESSED OR HOPELESS: NOT AT ALL

## 2023-07-31 NOTE — PROGRESS NOTES
"Subjective   Patient ID: Juan Jose Roman is a 73 y.o. male who presents for Follow-up (Discuss medications).    HPI   Overall well   Presenting to establish with me  #1 COPD-follows with pulmonology.  Clinically doing well.    #2 CAD- f/u  dr Love/Caro Alejandra-   #3 CVA- remote.  No clear etology.  Neg cards eval for afib.  f/u  neuro/cards  #4 lipids-on treatment without difficulty.  #5 depression/anxiety-overall mood has been good  #6 insomnia-relatively stable with melatonin.  #7 IFBS-tries to keep sugar and carbs limited.  #8 CKD3- retest 10/23.  No NSAIDS reviewed.   #9 subclinical hypothyroid- retest 10/23  #10 hypertension-no headache chest pain or dizziness.  #11 GERD-controlled.  No dysphagia.    Review of Systems  All systems reviewed and negative except as per history of present illness  Objective   /82   Temp 36.7 °C (98 °F)   Ht 1.778 m (5' 10\")   Wt 85.7 kg (189 lb)   BMI 27.12 kg/m²     Physical Exam  Alert and oriented x3.  No acute distress    Lab Results   Component Value Date    WBC 7.3 04/04/2023    HGB 14.0 04/04/2023    HCT 45.5 04/04/2023     04/04/2023    CHOL 140 04/04/2023    TRIG 88 04/04/2023    HDL 53.1 04/04/2023    ALT 23 04/04/2023    AST 20 04/04/2023     04/04/2023    K 4.5 04/04/2023     04/04/2023    CREATININE 1.37 (H) 04/04/2023    BUN 27 (H) 04/04/2023    CO2 25 04/04/2023    TSH 3.90 04/04/2023    PSA 2.26 04/04/2023    INR 1.1 12/11/2019    HGBA1C 5.8 (H) 01/25/2021       Assessment/Plan     #1 COPD-clinically stable.  I do not have details.  Continue inhalers and as needed prednisone per pulmonology.  F/u  dr Wright.    #2 CAD-clinically stable.  F/u  dr Love/Caro Alejandra.   #3 CVA- remote.  No clear etology.  Neg cards eval for afib.  f/u  neuro/cards  #4 lipids-Labs before next visit  #5 depression/anxiety- f/u psychiatry  #6 insomnia- f/u  w/ psychiatry. Con't melatonin.      #7 IFBS-reviewed.  Low sugar and carbohydrate diet with " exercise.  Check A1c  #8 CKD3- retest 10/23.  No NSAIDS reviewed.  Hydration  #9 subclinical hypothyroid- retest 10/23  #10 hypertension-good.  Continue treatment  #11 GERD-controlled  #12 seasonal allergic rhinitis-significant.  Continue current treatment.

## 2023-08-01 PROBLEM — R13.10 PAIN ON SWALLOWING: Status: RESOLVED | Noted: 2023-06-21 | Resolved: 2023-08-01

## 2023-08-01 PROBLEM — R00.2 PALPITATIONS: Status: RESOLVED | Noted: 2023-03-24 | Resolved: 2023-08-01

## 2023-08-01 PROBLEM — I47.20 VENTRICULAR TACHYCARDIA (MULTI): Status: ACTIVE | Noted: 2023-08-01

## 2023-08-01 PROBLEM — M79.18 INTERCOSTAL MUSCLE PAIN: Status: RESOLVED | Noted: 2023-06-21 | Resolved: 2023-08-01

## 2023-08-01 PROBLEM — R06.00 DYSPNEA: Status: RESOLVED | Noted: 2023-03-24 | Resolved: 2023-08-01

## 2023-08-01 PROBLEM — K21.00 GASTROESOPHAGEAL REFLUX DISEASE WITH ESOPHAGITIS: Status: RESOLVED | Noted: 2019-10-11 | Resolved: 2023-08-01

## 2023-08-01 PROBLEM — G83.14: Status: ACTIVE | Noted: 2023-08-01

## 2023-08-01 PROBLEM — I49.3 VENTRICULAR PREMATURE DEPOLARIZATION: Status: RESOLVED | Noted: 2023-03-24 | Resolved: 2023-08-01

## 2023-08-01 PROBLEM — J30.0 ACUTE VASOMOTOR RHINITIS: Status: RESOLVED | Noted: 2023-06-21 | Resolved: 2023-08-01

## 2023-08-01 PROBLEM — I49.9 ARRHYTHMIA: Status: RESOLVED | Noted: 2023-03-24 | Resolved: 2023-08-01

## 2023-08-01 PROBLEM — I95.2 HYPOTENSION DUE TO DRUGS: Status: RESOLVED | Noted: 2023-03-24 | Resolved: 2023-08-01

## 2023-09-25 ENCOUNTER — TELEMEDICINE (OUTPATIENT)
Dept: PRIMARY CARE | Facility: CLINIC | Age: 73
End: 2023-09-25
Payer: MEDICARE

## 2023-09-25 ENCOUNTER — TELEPHONE (OUTPATIENT)
Dept: PRIMARY CARE | Facility: CLINIC | Age: 73
End: 2023-09-25

## 2023-09-25 DIAGNOSIS — U07.1 COVID-19 VIRUS INFECTION: Primary | ICD-10-CM

## 2023-09-25 PROCEDURE — 99213 OFFICE O/P EST LOW 20 MIN: CPT | Performed by: INTERNAL MEDICINE

## 2023-09-25 RX ORDER — NIRMATRELVIR AND RITONAVIR 150-100 MG
2 KIT ORAL 2 TIMES DAILY
Qty: 20 TABLET | Refills: 0 | Status: SHIPPED | OUTPATIENT
Start: 2023-09-25 | End: 2023-09-30

## 2023-09-25 NOTE — PROGRESS NOTES
Subjective   Patient ID: [id number]   HPI  Virtual Visit  Ferny Roman is a 73 y.o. male who presents for No chief complaint on file..  He have, cough and nasal congestions.His wife is covid positive and now he is positive. He have mild symptoms and he is have COPD.      Objective   There were no vitals taken for this visit.   Review of Systems  All 12 systems reviewed, no other abnormality except that mentioned in HPI.    Physical Exam    Assessment/Plan   Problem List Items Addressed This Visit    None  Visit Diagnoses       COVID-19 virus infection    -  Primary    Relevant Medications    nirmatrelvir-ritonavir (Paxlovid) 150-100 mg tablet therapy pack        As he have mild symptoms and He has COPD- paxlovid given.    Genie Conte MD

## 2023-09-25 NOTE — TELEPHONE ENCOUNTER
Pt's wife, No, left a msg stating that Ferny tested positive with Covid with S/S starting on 9/22.  They didn't know if he could still get Paxlovid.  Ph[facility] is [facility].

## 2023-11-07 ENCOUNTER — EVALUATION (OUTPATIENT)
Dept: PHYSICAL THERAPY | Facility: CLINIC | Age: 73
End: 2023-11-07
Payer: MEDICARE

## 2023-11-07 DIAGNOSIS — M70.61 TROCHANTERIC BURSITIS, RIGHT HIP: ICD-10-CM

## 2023-11-07 DIAGNOSIS — E03.9 HYPOTHYROIDISM, UNSPECIFIED TYPE: Primary | ICD-10-CM

## 2023-11-07 DIAGNOSIS — M25.552 HIP PAIN, LEFT: Primary | ICD-10-CM

## 2023-11-07 DIAGNOSIS — M51.37 OTHER INTERVERTEBRAL DISC DEGENERATION, LUMBOSACRAL REGION: ICD-10-CM

## 2023-11-07 PROCEDURE — 97110 THERAPEUTIC EXERCISES: CPT | Mod: GP | Performed by: PHYSICAL THERAPIST

## 2023-11-07 PROCEDURE — 97161 PT EVAL LOW COMPLEX 20 MIN: CPT | Mod: GP | Performed by: PHYSICAL THERAPIST

## 2023-11-07 PROCEDURE — 97535 SELF CARE MNGMENT TRAINING: CPT | Mod: GP | Performed by: PHYSICAL THERAPIST

## 2023-11-07 ASSESSMENT — ENCOUNTER SYMPTOMS
LOSS OF SENSATION IN FEET: 0
DEPRESSION: 0
OCCASIONAL FEELINGS OF UNSTEADINESS: 0

## 2023-11-07 NOTE — LETTER
November 7, 2023    Aly Dawn MD  [address] [facility] [address]    Patient: Ferny Roman   YOB: 1950   Date of Visit: 11/7/2023       Dear Aly Dawn Md  [address]  [place] [address]    The attached plan of care is being sent to you because your patient’s medical reimbursement requires that you certify the plan of care. Your signature is required to allow uninterrupted insurance coverage.      You may indicate your approval by signing below and faxing this form back to us at Dept Fax: [phone].    Please call Dept: [phone] with any questions or concerns.    Thank you for this referral,        Kelsey Barnes, PT  [facility] [address] [place] [address]  [address] [facility] [address]    Payer: Payor: MEDICARE / Plan: MEDICARE PART A AND B / Product Type: *No Product type* /                                                                         Date:     Dear Kelsey Barnes, PT,     Re: Mr. Juan Jose Roman, MRN:[id number]    I certify that I have reviewed the attached plan of care and it is medically necessary for Mr. Juan Jose Roman (1950) who is under my care.          ______________________________________                    _________________  Provider name and credentials                                           Date and time                                                                                           Plan of Care 11/7/23   Effective from: 11/7/2023  Effective to: 1/19/2024    Plan ID: [id number]            Participants as of Finalize on 11/7/2023    Name Type Comments Contact Info    Aly Dawn MD Consulting Physician Please sign Medicare plan of care [phone]    Kelsey Barnes, PT Physical Therapist  [phone]       Last Plan Note     Author: Kelsey Barnes PT Status: Sign when Signing Visit Last edited: 11/7/2023  3:00 PM      "  Physical Therapy Evaluation    Patient Name: Juan Jose Roman  MRN: [id number]  Today's Date: 11/7/2023  Visit: 1/MN  Referred by: Aly Dawn  Time Calculation  Start Time: 1515  Stop Time: 1620  Time Calculation (min): 65 min  Diagnosis:   1. Hip pain, left        2. Trochanteric bursitis, right hip  Referral to Physical Therapy      3. Other intervertebral disc degeneration, lumbosacral region  Referral to Physical Therapy        PRECAUTIONS:    Fall risk, HTN, emphysema, stroke (mild)    SUBJECTIVE:  General  Reason for Referral: L hip pain  Referred By: Aly Dawn  Patient was seen for PT for balance and vertigo issues in the past with me. (Since 2021) Was here last August of 2022 with complaints of L hip pain and glut med issues. Currently working on bridges, hip abduction, SLS, piriformis stretch, Tband hip abduction, planks as his continued rehab for this. Last June reports he had injections in his hips (greater trochanter bilaterally) with good relief x 6-7 months.    Pain:   L hip pain worse with L sidly at night. Describes pain as 5/10, lowest pain number is 0/10 most of time. Better with massage. Occasionally will feel it with moving/change position in chair, and on back swing with golf, or with bringing L leg into hip flexion with ER.  Home Living:   No concerns, lives with wife  Prior level of function:   Healthy, active, enjoys golf (but limited), works out at gym (mostly legwork)  Currently limited with walking hills (cautious to avoid pain with hip weight bearing)    OBJECTIVE:  Gait- walks with hip ER, \"waddling\" with excessive lateral excursion  SLS 3 sec, SLS 5 sec    LE strength-  Hip flexion/IR/ER 4+/5 bilaterally  Quads and hams 4+/5  R hip abduction is 4+/5 and painless, L hip abduction is 3+/5  Bilateral hip extension is 3-/5    +TTP L glut med and R greater trochanter  - spring testing thoracolumbar spine   Ham flexibility -35 SLR bilaterally    Outcome Measure:   10% " disability    ASSESSMENT:  Patient is 74 yo male with signs and symptoms of L hip greater trochanteric bursitis, with symptoms increased on compression/direct pressure (ie L sidly) or with active abduction of L hip. He continues to have some L sided glut med issues as well. Patient would benefit from skilled PT to address these issues.    TREATMENT:  - Therex:   Access Code: [email or web address]  URL: [email or web address]  Date: 11/07/2023  Prepared by: Kelsey Barnes    Exercises  - Single Leg Bridge  - 2 x daily - 7 x weekly - 1 sets - 5 reps - 5 hold  - Long Sitting Isometric Hip Abduction with Ball at Wall  - 2 x daily - 7 x weekly - 2 sets - 10 reps - 3 hold  - Standing Hamstring Stretch on Chair  - 2 x daily - 7 x weekly - 1 sets - 3 reps - 20 hold    Patient Education  - Trochanteric Bursitis    PATIENT EDUCATION:   Reviewed anatomy and greater trochanteric bursitis diagnosis; avoidance of hip stressors, use of ice, sleeping posture with pillow between knees, issued home ex program    PLAN:   Treatment/Interventions: Cryotherapy, Education/ Instruction, Electrical stimulation, Therapeutic exercises, Neuromuscular re-education, Ultrasound  PT Plan: Skilled PT  PT Frequency: 2 times per week  Duration: 8-10 weeks  Onset Date: 10/06/23  Certification Period Start Date: 11/07/23  Certification Period End Date: 01/19/24  Rehab Potential: Good  Plan of Care Agreement: Patient    GOALS:  Active       PT Problem       Patient will achieve bilateral hip extension strength of 4+/5 for improved hip stability       Start:  11/07/23    Expected End:  01/19/24            Patient will achieve left hip abduction strength of 4+/5       Start:  11/07/23    Expected End:  01/19/24            Patient will demonstrate independence in home program for support of progression       Start:  11/07/23    Expected End:  01/19/24            Patient will be able to participate fully in biomechanically correct gym program while  avoiding hip stressors       Start:  11/07/23    Expected End:  01/19/24            Patient will show a significant change in Oswestry patient reported outcome tool 4% disability to demonstrate subjective improvement       Start:  11/07/23    Expected End:  01/19/24                     Current Participants as of 11/7/2023    Name Type Comments Contact Info    Aly Dawn MD Consulting Physician Please sign Medicare plan of care [phone]    Signature pending    Kelsey Barnes, PT Physical Therapist  [phone]

## 2023-11-07 NOTE — LETTER
November 7, 2023    Aly Dawn    Patient: Ferny Roman   YOB: 1950   Date of Visit: 11/7/2023       Dear Aly Dawn Md  [facility] [place] [address]    The attached plan of care is being sent to you because your patient’s medical reimbursement requires that you certify the plan of care. Your signature is required to allow uninterrupted insurance coverage.      You may indicate your approval by signing below and faxing this form back to us at Dept Fax: [phone].    Please call Dept: [phone] with any questions or concerns.    Thank you for this referral,        Kelsey Barnes, PT  [facility] [address] [facility]  [address] [facility] [address]    Payer: Payor: MEDICARE / Plan: MEDICARE PART A AND B / Product Type: *No Product type* /                                                                         Date: 11/07/23    Dear Kelsey Barnes PT,     Re: Mr. Juan Jose Roman, MRN:[id number]    I certify that I have reviewed the attached plan of care and it is medically necessary for Mr. Juan Jose Roman (1950) who is under my care.          ______________________________________                    _________________  Provider name and credentials                                           Date and time                                                                                           Plan of Care 11/7/23   Effective from: 11/7/2023  Effective to: 1/19/2024    Plan ID: [id number]                Participants as of Finalize on 11/7/2023      Name Type Comments Contact Info    Aly Dawn MD Consulting Physician Please sign Medicare plan of care [phone]    Kelsey Barnes PT Physical Therapist  [phone]           Last Plan Note       Author: Kelsey Barnes PT Status: Sign when Signing Visit Last edited: 11/7/2023  3:00 PM         Physical Therapy Evaluation    Patient Name: Juan Jose ALEGRIA  "Jessie  MRN: [id number]  Today's Date: 11/7/2023  Visit: 1/MN  Referred by: Aly Dawn  Time Calculation  Start Time: 1515  Stop Time: 1620  Time Calculation (min): 65 min  Diagnosis:   1. Hip pain, left        2. Trochanteric bursitis, right hip  Referral to Physical Therapy      3. Other intervertebral disc degeneration, lumbosacral region  Referral to Physical Therapy        PRECAUTIONS:    Fall risk, HTN, emphysema, stroke (mild)    SUBJECTIVE:  General  Reason for Referral: L hip pain  Referred By: Aly Dawn  Patient was seen for PT for balance and vertigo issues in the past with me. (Since 2021) Was here last August of 2022 with complaints of L hip pain and glut med issues. Currently working on bridges, hip abduction, SLS, piriformis stretch, Tband hip abduction, planks as his continued rehab for this. Last June reports he had injections in his hips (greater trochanter bilaterally) with good relief x 6-7 months.    Pain:   L hip pain worse with L sidly at night. Describes pain as 5/10, lowest pain number is 0/10 most of time. Better with massage. Occasionally will feel it with moving/change position in chair, and on back swing with golf, or with bringing L leg into hip flexion with ER.  Home Living:   No concerns, lives with wife  Prior level of function:   Healthy, active, enjoys golf (but limited), works out at gym (mostly legwork)  Currently limited with walking hills (cautious to avoid pain with hip weight bearing)    OBJECTIVE:  Gait- walks with hip ER, \"waddling\" with excessive lateral excursion  SLS 3 sec, SLS 5 sec    LE strength-  Hip flexion/IR/ER 4+/5 bilaterally  Quads and hams 4+/5  R hip abduction is 4+/5 and painless, L hip abduction is 3+/5  Bilateral hip extension is 3-/5    +TTP L glut med and R greater trochanter  - spring testing thoracolumbar spine   Ham flexibility -35 SLR bilaterally    Outcome Measure:   10% disability    ASSESSMENT:  Patient is 72 yo male with signs and symptoms " of L hip greater trochanteric bursitis, with symptoms increased on compression/direct pressure (ie L sidly) or with active abduction of L hip. He continues to have some L sided glut med issues as well. Patient would benefit from skilled PT to address these issues.    TREATMENT:  - Therex:   Access Code: [email or web address]  URL: [email or web address]  Date: 11/07/2023  Prepared by: Kelsey Barnes    Exercises  - Single Leg Bridge  - 2 x daily - 7 x weekly - 1 sets - 5 reps - 5 hold  - Long Sitting Isometric Hip Abduction with Ball at Wall  - 2 x daily - 7 x weekly - 2 sets - 10 reps - 3 hold  - Standing Hamstring Stretch on Chair  - 2 x daily - 7 x weekly - 1 sets - 3 reps - 20 hold    Patient Education  - Trochanteric Bursitis    PATIENT EDUCATION:   Reviewed anatomy and greater trochanteric bursitis diagnosis; avoidance of hip stressors, use of ice, sleeping posture with pillow between knees, issued home ex program    PLAN:   Treatment/Interventions: Cryotherapy, Education/ Instruction, Electrical stimulation, Therapeutic exercises, Neuromuscular re-education, Ultrasound  PT Plan: Skilled PT  PT Frequency: 2 times per week  Duration: 8-10 weeks  Onset Date: 10/06/23  Certification Period Start Date: 11/07/23  Certification Period End Date: 01/19/24  Rehab Potential: Good  Plan of Care Agreement: Patient    GOALS:  Active       PT Problem       Patient will achieve bilateral hip extension strength of 4+/5 for improved hip stability       Start:  11/07/23    Expected End:  01/19/24            Patient will achieve left hip abduction strength of 4+/5       Start:  11/07/23    Expected End:  01/19/24            Patient will demonstrate independence in home program for support of progression       Start:  11/07/23    Expected End:  01/19/24            Patient will be able to participate fully in biomechanically correct gym program while avoiding hip stressors       Start:  11/07/23    Expected End:  01/19/24             Patient will show a significant change in Oswestry patient reported outcome tool 4% disability to demonstrate subjective improvement       Start:  11/07/23    Expected End:  01/19/24                   Current Participants as of 11/7/2023      Name Type Comments Contact Info    Aly Dawn MD Consulting Physician Please sign Medicare plan of care [phone]    Signature pending    Kelsey Barnes, PT Physical Therapist  [phone]

## 2023-11-07 NOTE — LETTER
November 7, 2023      No Recipients    Patient: Ferny Roman   YOB: 1950   Date of Visit: 11/7/2023       Dear Aly Dawn Md  [facility] [address]    The attached plan of care is being sent to you because your patient’s medical reimbursement requires that you certify the plan of care. Your signature is required to allow uninterrupted insurance coverage.      You may indicate your approval by signing below and faxing this form back to us at Dept Fax: [phone].    Please call Dept: [phone] with any questions or concerns.    Thank you for this referral,        Kelsey Barnes PT  [address]  [address] [facility] [address]    Payer: Payor: MEDICARE / Plan: MEDICARE PART A AND B / Product Type: *No Product type* /                                                                         Date:     Dear Kelsey Barnes PT,     Re: Mr. Juan Jose Roman, MRN:[id number]    I certify that I have reviewed the attached plan of care and it is medically necessary for Mr. Juan Jose Roman (1950) who is under my care.          ______________________________________                    _________________  Provider name and credentials                                           Date and time                                                                                           Plan of Care 11/7/23   Effective from: 11/7/2023  Effective to: 1/19/2024    Plan ID: [id number]                Participants as of Finalize on 11/7/2023      Name Type Comments Contact Info    Aly Dawn MD Consulting Physician Please sign Medicare plan of care [phone]    Kelsey Barnes PT Physical Therapist  [phone]           Last Plan Note       Author: Kelsey Barnes PT Status: Sign when Signing Visit Last edited: 11/7/2023  3:00 PM         Physical Therapy Evaluation    Patient Name: Juan Jose Roman  MRN:  "[id number]  Today's Date: 11/7/2023  Visit: 1/MN  Referred by: Aly Dawn  Time Calculation  Start Time: 1515  Stop Time: 1620  Time Calculation (min): 65 min  Diagnosis:   1. Hip pain, left        2. Trochanteric bursitis, right hip  Referral to Physical Therapy      3. Other intervertebral disc degeneration, lumbosacral region  Referral to Physical Therapy        PRECAUTIONS:    Fall risk, HTN, emphysema, stroke (mild)    SUBJECTIVE:  General  Reason for Referral: L hip pain  Referred By: Aly Dawn  Patient was seen for PT for balance and vertigo issues in the past with me. (Since 2021) Was here last August of 2022 with complaints of L hip pain and glut med issues. Currently working on bridges, hip abduction, SLS, piriformis stretch, Tband hip abduction, planks as his continued rehab for this. Last June reports he had injections in his hips (greater trochanter bilaterally) with good relief x 6-7 months.    Pain:   L hip pain worse with L sidly at night. Describes pain as 5/10, lowest pain number is 0/10 most of time. Better with massage. Occasionally will feel it with moving/change position in chair, and on back swing with golf, or with bringing L leg into hip flexion with ER.  Home Living:   No concerns, lives with wife  Prior level of function:   Healthy, active, enjoys golf (but limited), works out at gym (mostly legwork)  Currently limited with walking hills (cautious to avoid pain with hip weight bearing)    OBJECTIVE:  Gait- walks with hip ER, \"waddling\" with excessive lateral excursion  SLS 3 sec, SLS 5 sec    LE strength-  Hip flexion/IR/ER 4+/5 bilaterally  Quads and hams 4+/5  R hip abduction is 4+/5 and painless, L hip abduction is 3+/5  Bilateral hip extension is 3-/5    +TTP L glut med and R greater trochanter  - spring testing thoracolumbar spine   Ham flexibility -35 SLR bilaterally    Outcome Measure:   10% disability    ASSESSMENT:  Patient is 74 yo male with signs and symptoms of L hip " greater trochanteric bursitis, with symptoms increased on compression/direct pressure (ie L sidly) or with active abduction of L hip. He continues to have some L sided glut med issues as well. Patient would benefit from skilled PT to address these issues.    TREATMENT:  - Therex:   Access Code: [email or web address]  URL: [email or web address]  Date: 11/07/2023  Prepared by: Kelsey Barnes    Exercises  - Single Leg Bridge  - 2 x daily - 7 x weekly - 1 sets - 5 reps - 5 hold  - Long Sitting Isometric Hip Abduction with Ball at Wall  - 2 x daily - 7 x weekly - 2 sets - 10 reps - 3 hold  - Standing Hamstring Stretch on Chair  - 2 x daily - 7 x weekly - 1 sets - 3 reps - 20 hold    Patient Education  - Trochanteric Bursitis    PATIENT EDUCATION:   Reviewed anatomy and greater trochanteric bursitis diagnosis; avoidance of hip stressors, use of ice, sleeping posture with pillow between knees, issued home ex program    PLAN:   Treatment/Interventions: Cryotherapy, Education/ Instruction, Electrical stimulation, Therapeutic exercises, Neuromuscular re-education, Ultrasound  PT Plan: Skilled PT  PT Frequency: 2 times per week  Duration: 8-10 weeks  Onset Date: 10/06/23  Certification Period Start Date: 11/07/23  Certification Period End Date: 01/19/24  Rehab Potential: Good  Plan of Care Agreement: Patient    GOALS:  Active       PT Problem       Patient will achieve bilateral hip extension strength of 4+/5 for improved hip stability       Start:  11/07/23    Expected End:  01/19/24            Patient will achieve left hip abduction strength of 4+/5       Start:  11/07/23    Expected End:  01/19/24            Patient will demonstrate independence in home program for support of progression       Start:  11/07/23    Expected End:  01/19/24            Patient will be able to participate fully in biomechanically correct gym program while avoiding hip stressors       Start:  11/07/23    Expected End:  01/19/24             Patient will show a significant change in Oswestry patient reported outcome tool 4% disability to demonstrate subjective improvement       Start:  11/07/23    Expected End:  01/19/24                   Current Participants as of 11/7/2023      Name Type Comments Contact Info    Aly Dawn MD Consulting Physician Please sign Medicare plan of care [phone]    Signature pending    Kelsey Barnes, PT Physical Therapist  [phone]

## 2023-11-07 NOTE — PROGRESS NOTES
"Physical Therapy Evaluation    Patient Name: Juan Jose Roman  MRN: [id number]  Today's Date: 11/7/2023  Visit: 1/MN  Referred by: Aly Dawn  Time Calculation  Start Time: 1515  Stop Time: 1620  Time Calculation (min): 65 min  Diagnosis:   1. Hip pain, left        2. Trochanteric bursitis, right hip  Referral to Physical Therapy      3. Other intervertebral disc degeneration, lumbosacral region  Referral to Physical Therapy        PRECAUTIONS:    Fall risk, HTN, emphysema, stroke (mild)    SUBJECTIVE:  General  Reason for Referral: L hip pain  Referred By: Aly Dawn  Patient was seen for PT for balance and vertigo issues in the past with me. (Since 2021) Was here last August of 2022 with complaints of L hip pain and glut med issues. Currently working on bridges, hip abduction, SLS, piriformis stretch, Tband hip abduction, planks as his continued rehab for this. Last June reports he had injections in his hips (greater trochanter bilaterally) with good relief x 6-7 months.    Pain:   L hip pain worse with L sidly at night. Describes pain as 5/10, lowest pain number is 0/10 most of time. Better with massage. Occasionally will feel it with moving/change position in chair, and on back swing with golf, or with bringing L leg into hip flexion with ER.  Home Living:   No concerns, lives with wife  Prior level of function:   Healthy, active, enjoys golf (but limited), works out at gym (mostly legwork)  Currently limited with walking hills (cautious to avoid pain with hip weight bearing)    OBJECTIVE:  Gait- walks with hip ER, \"waddling\" with excessive lateral excursion  SLS 3 sec, SLS 5 sec    LE strength-  Hip flexion/IR/ER 4+/5 bilaterally  Quads and hams 4+/5  R hip abduction is 4+/5 and painless, L hip abduction is 3+/5  Bilateral hip extension is 3-/5    +TTP L glut med and R greater trochanter  - spring testing thoracolumbar spine   Ham flexibility -35 SLR bilaterally    Outcome Measure:   10% " disability    ASSESSMENT:  Patient is 72 yo male with signs and symptoms of L hip greater trochanteric bursitis, with symptoms increased on compression/direct pressure (ie L sidly) or with active abduction of L hip. He continues to have some L sided glut med issues as well. Patient would benefit from skilled PT to address these issues.    TREATMENT:  - Therex:   Access Code: [email or web address]  URL: [email or web address]  Date: 11/07/2023  Prepared by: Kelsey Barnes    Exercises  - Single Leg Bridge  - 2 x daily - 7 x weekly - 1 sets - 5 reps - 5 hold  - Long Sitting Isometric Hip Abduction with Ball at Wall  - 2 x daily - 7 x weekly - 2 sets - 10 reps - 3 hold  - Standing Hamstring Stretch on Chair  - 2 x daily - 7 x weekly - 1 sets - 3 reps - 20 hold    Patient Education  - Trochanteric Bursitis    PATIENT EDUCATION:   Reviewed anatomy and greater trochanteric bursitis diagnosis; avoidance of hip stressors, use of ice, sleeping posture with pillow between knees, issued home ex program    PLAN:   Treatment/Interventions: Cryotherapy, Education/ Instruction, Electrical stimulation, Therapeutic exercises, Neuromuscular re-education, Ultrasound  PT Plan: Skilled PT  PT Frequency: 2 times per week  Duration: 8-10 weeks  Onset Date: 10/06/23  Certification Period Start Date: 11/07/23  Certification Period End Date: 01/19/24  Rehab Potential: Good  Plan of Care Agreement: Patient    GOALS:  Active       PT Problem       Patient will achieve bilateral hip extension strength of 4+/5 for improved hip stability       Start:  11/07/23    Expected End:  01/19/24            Patient will achieve left hip abduction strength of 4+/5       Start:  11/07/23    Expected End:  01/19/24            Patient will demonstrate independence in home program for support of progression       Start:  11/07/23    Expected End:  01/19/24            Patient will be able to participate fully in biomechanically correct gym program while  avoiding hip stressors       Start:  11/07/23    Expected End:  01/19/24            Patient will show a significant change in Oswestry patient reported outcome tool 4% disability to demonstrate subjective improvement       Start:  11/07/23    Expected End:  01/19/24

## 2023-11-07 NOTE — Clinical Note
November 7, 2023     Patient: Juan Jose Roman   YOB: 1950   Date of Visit: 11/7/2023       To Whom It May Concern:    It is my medical opinion that Juan Jose Roman {Work release (duty restriction):31557}.    If you have any questions or concerns, please don't hesitate to call.         Sincerely,        Kelsey Barnes, PT    CC: No Recipients

## 2023-11-07 NOTE — Clinical Note
November 7, 2023     Patient: Juan Jose Roman   YOB: 1950   Date of Visit: 11/7/2023       To Whom it May Concern:    Juan Jose Roman was seen in my clinic on 11/7/2023. He {Return to school/sport:51439}.    If you have any questions or concerns, please don't hesitate to call.         Sincerely,          Kelsey Barnes, PT        CC: No Recipients

## 2023-11-09 RX ORDER — LEVOTHYROXINE SODIUM 88 UG/1
88 TABLET ORAL DAILY
Qty: 90 TABLET | Refills: 0 | Status: SHIPPED | OUTPATIENT
Start: 2023-11-09 | End: 2023-12-13 | Stop reason: SDUPTHER

## 2023-11-09 NOTE — TELEPHONE ENCOUNTER
LM on pt voicemail and relayed message. I told him we would send in a bridge till his appointment with Dr Chowdhury. Told him blood work is in the system and he should go down there before his next appointment and that he needs to be fasting 12 hours.

## 2023-11-22 ENCOUNTER — HOSPITAL ENCOUNTER (OUTPATIENT)
Dept: CARDIOLOGY | Facility: CLINIC | Age: 73
Discharge: HOME | End: 2023-11-22
Payer: MEDICARE

## 2023-11-22 DIAGNOSIS — I63.9 CEREBRAL INFARCTION, UNSPECIFIED (MULTI): ICD-10-CM

## 2023-11-28 ENCOUNTER — TREATMENT (OUTPATIENT)
Dept: PHYSICAL THERAPY | Facility: CLINIC | Age: 73
End: 2023-11-28
Payer: MEDICARE

## 2023-11-28 DIAGNOSIS — M25.552 HIP PAIN, LEFT: ICD-10-CM

## 2023-11-28 DIAGNOSIS — M51.37 OTHER INTERVERTEBRAL DISC DEGENERATION, LUMBOSACRAL REGION: ICD-10-CM

## 2023-11-28 DIAGNOSIS — M70.61 TROCHANTERIC BURSITIS, RIGHT HIP: ICD-10-CM

## 2023-11-28 PROCEDURE — 97010 HOT OR COLD PACKS THERAPY: CPT | Mod: GP | Performed by: PHYSICAL THERAPIST

## 2023-11-28 PROCEDURE — 97110 THERAPEUTIC EXERCISES: CPT | Mod: GP | Performed by: PHYSICAL THERAPIST

## 2023-11-28 NOTE — PROGRESS NOTES
Physical Therapy Treatment    Patient Name: Juan Jose Roman  MRN: [id number]  Today's Date: 11/28/2023   Visit 2/MN  Cert date: 11/07/23- 01/19/24  Referred by: Aly Dawn   Time Calculation  Start Time: 1445  Stop Time: 1535  Time Calculation (min): 50 min  1. Trochanteric bursitis, right hip  Follow Up In Physical Therapy      2. Other intervertebral disc degeneration, lumbosacral region  Follow Up In Physical Therapy      3. Hip pain, left  Follow Up In Physical Therapy      PRECAUTIONS:  Fall risk, HTN, emphysema, stroke (mild)     SUBJECTIVE:  Going to the gym 5-7 x week doing leg work and core work. Has been compliant with home exercises. Had injections in his hips 10 days ago and feels this and his hip exercises are helping to decrease his pain. Has been using ice at home which has been helpful.  Pain level: 0/10 currently, states no real discomfort over the last week and is encouraged regarding his progress, pain reduction.  Compliant with HEP? yes    OBJECTIVE:  R hip extension is 4+/5, L is 4/5  L hip abduction is 4/5 and painful at L great troch    TREATMENT:  - Therex:  Single leg bridge R/L x 10 reps  Iso hip abduction with pillow 2 x 10 reps R/L  Seated hamstring stretch 2 x 30 sec  Iso transverse abdominis  L hip IT band stretch 2 x 30 sec    Modalities: MH to bilateral hips x 10 minutes in sitting for warm up prior to exercise.     ASSESSMENT: Patient demonstrates early improvement in his hip strength/stability. Able to perform R sidly without exacerbation of hip symptoms.      PLAN:    Patient will be going out of town first week of January. Continue with hip/core strength and stability, LE flexibility, modalities as needed.

## 2023-12-03 DIAGNOSIS — I10 BENIGN ESSENTIAL HYPERTENSION: ICD-10-CM

## 2023-12-04 DIAGNOSIS — K21.9 GASTROESOPHAGEAL REFLUX DISEASE, UNSPECIFIED WHETHER ESOPHAGITIS PRESENT: Primary | ICD-10-CM

## 2023-12-04 RX ORDER — FAMOTIDINE 20 MG/1
20 TABLET, FILM COATED ORAL NIGHTLY
Qty: 90 TABLET | Refills: 1 | Status: SHIPPED | OUTPATIENT
Start: 2023-12-04 | End: 2023-12-13 | Stop reason: WASHOUT

## 2023-12-04 RX ORDER — LOSARTAN POTASSIUM 50 MG/1
50 TABLET ORAL 2 TIMES DAILY
Qty: 180 TABLET | Refills: 1 | Status: SHIPPED | OUTPATIENT
Start: 2023-12-04 | End: 2024-05-13

## 2023-12-06 PROCEDURE — 93298 REM INTERROG DEV EVAL SCRMS: CPT | Performed by: INTERNAL MEDICINE

## 2023-12-08 ENCOUNTER — LAB (OUTPATIENT)
Dept: LAB | Facility: LAB | Age: 73
End: 2023-12-08
Payer: MEDICARE

## 2023-12-08 DIAGNOSIS — E03.9 HYPOTHYROIDISM, UNSPECIFIED TYPE: ICD-10-CM

## 2023-12-08 DIAGNOSIS — N40.1 BENIGN PROSTATIC HYPERPLASIA WITH LOWER URINARY TRACT SYMPTOMS, SYMPTOM DETAILS UNSPECIFIED: ICD-10-CM

## 2023-12-08 DIAGNOSIS — N18.31 STAGE 3A CHRONIC KIDNEY DISEASE (MULTI): ICD-10-CM

## 2023-12-08 DIAGNOSIS — E78.5 HYPERLIPIDEMIA, UNSPECIFIED HYPERLIPIDEMIA TYPE: ICD-10-CM

## 2023-12-08 DIAGNOSIS — R73.09 ELEVATED GLUCOSE: ICD-10-CM

## 2023-12-08 LAB
ALT SERPL W P-5'-P-CCNC: 36 U/L (ref 10–52)
ANION GAP SERPL CALC-SCNC: 12 MMOL/L (ref 10–20)
BUN SERPL-MCNC: 26 MG/DL (ref 6–23)
CALCIUM SERPL-MCNC: 9.2 MG/DL (ref 8.6–10.3)
CHLORIDE SERPL-SCNC: 106 MMOL/L (ref 98–107)
CHOLEST SERPL-MCNC: 137 MG/DL (ref 0–199)
CHOLESTEROL/HDL RATIO: 2
CO2 SERPL-SCNC: 27 MMOL/L (ref 21–32)
CREAT SERPL-MCNC: 1.44 MG/DL (ref 0.5–1.3)
ERYTHROCYTE [DISTWIDTH] IN BLOOD BY AUTOMATED COUNT: 15.1 % (ref 11.5–14.5)
GFR SERPL CREATININE-BSD FRML MDRD: 51 ML/MIN/1.73M*2
GLUCOSE SERPL-MCNC: 83 MG/DL (ref 74–99)
HCT VFR BLD AUTO: 49.8 % (ref 41–52)
HDLC SERPL-MCNC: 68.5 MG/DL
HGB BLD-MCNC: 15.6 G/DL (ref 13.5–17.5)
LDLC SERPL CALC-MCNC: 57 MG/DL
MCH RBC QN AUTO: 29.6 PG (ref 26–34)
MCHC RBC AUTO-ENTMCNC: 31.3 G/DL (ref 32–36)
MCV RBC AUTO: 95 FL (ref 80–100)
NON HDL CHOLESTEROL: 69 MG/DL (ref 0–149)
NRBC BLD-RTO: 0 /100 WBCS (ref 0–0)
PLATELET # BLD AUTO: 232 X10*3/UL (ref 150–450)
POTASSIUM SERPL-SCNC: 4.8 MMOL/L (ref 3.5–5.3)
PSA SERPL-MCNC: 2.13 NG/ML
RBC # BLD AUTO: 5.27 X10*6/UL (ref 4.5–5.9)
SODIUM SERPL-SCNC: 140 MMOL/L (ref 136–145)
TRIGL SERPL-MCNC: 59 MG/DL (ref 0–149)
TSH SERPL-ACNC: 3.11 MIU/L (ref 0.44–3.98)
VLDL: 12 MG/DL (ref 0–40)
WBC # BLD AUTO: 10.9 X10*3/UL (ref 4.4–11.3)

## 2023-12-08 PROCEDURE — 80061 LIPID PANEL: CPT

## 2023-12-08 PROCEDURE — 84153 ASSAY OF PSA TOTAL: CPT

## 2023-12-08 PROCEDURE — 85027 COMPLETE CBC AUTOMATED: CPT

## 2023-12-08 PROCEDURE — 80048 BASIC METABOLIC PNL TOTAL CA: CPT

## 2023-12-08 PROCEDURE — 84460 ALANINE AMINO (ALT) (SGPT): CPT

## 2023-12-08 PROCEDURE — 36415 COLL VENOUS BLD VENIPUNCTURE: CPT

## 2023-12-08 PROCEDURE — 83036 HEMOGLOBIN GLYCOSYLATED A1C: CPT

## 2023-12-08 PROCEDURE — 84443 ASSAY THYROID STIM HORMONE: CPT

## 2023-12-09 LAB
EST. AVERAGE GLUCOSE BLD GHB EST-MCNC: 123 MG/DL
HBA1C MFR BLD: 5.9 %

## 2023-12-12 ENCOUNTER — OFFICE VISIT (OUTPATIENT)
Dept: CARDIOLOGY | Facility: CLINIC | Age: 73
End: 2023-12-12
Payer: MEDICARE

## 2023-12-12 VITALS
HEART RATE: 65 BPM | DIASTOLIC BLOOD PRESSURE: 62 MMHG | WEIGHT: 201 LBS | HEIGHT: 71 IN | SYSTOLIC BLOOD PRESSURE: 109 MMHG | BODY MASS INDEX: 28.14 KG/M2

## 2023-12-12 DIAGNOSIS — I49.3 PVC (PREMATURE VENTRICULAR CONTRACTION): Primary | ICD-10-CM

## 2023-12-12 DIAGNOSIS — I10 ESSENTIAL HYPERTENSION: ICD-10-CM

## 2023-12-12 DIAGNOSIS — I25.119 CORONARY ARTERY DISEASE INVOLVING NATIVE CORONARY ARTERY OF NATIVE HEART WITH ANGINA PECTORIS (CMS-HCC): ICD-10-CM

## 2023-12-12 DIAGNOSIS — E78.5 HYPERLIPIDEMIA, UNSPECIFIED HYPERLIPIDEMIA TYPE: ICD-10-CM

## 2023-12-12 PROCEDURE — 3078F DIAST BP <80 MM HG: CPT | Performed by: NURSE PRACTITIONER

## 2023-12-12 PROCEDURE — 1159F MED LIST DOCD IN RCRD: CPT | Performed by: NURSE PRACTITIONER

## 2023-12-12 PROCEDURE — 99214 OFFICE O/P EST MOD 30 MIN: CPT | Performed by: NURSE PRACTITIONER

## 2023-12-12 PROCEDURE — 1126F AMNT PAIN NOTED NONE PRSNT: CPT | Performed by: NURSE PRACTITIONER

## 2023-12-12 PROCEDURE — 1036F TOBACCO NON-USER: CPT | Performed by: NURSE PRACTITIONER

## 2023-12-12 PROCEDURE — 1160F RVW MEDS BY RX/DR IN RCRD: CPT | Performed by: NURSE PRACTITIONER

## 2023-12-12 PROCEDURE — 3074F SYST BP LT 130 MM HG: CPT | Performed by: NURSE PRACTITIONER

## 2023-12-12 NOTE — PROGRESS NOTES
"Chief Complaint:   PVC     History Of Present Illness:    Juan Jose Roman \"Ferny\" is a 73 y.o. male here with history of PVC, undergone RFA x 2.  Rare palpitations.   Exercising 3 x a week. Tolerating.        Frequent PVC's/RFA 6/10/15 [Severe RCA - EMILY] - 4/24/2015  PVCs [RFA] - 12/20/2019  Cardiac Calcium Score (Total 722:) - 1/2008    MPI (Negative for ischemia. Moderate LV thickness. Mild AVR) - 6/1/2022    Allergies:  Tetracycline, Tetracycline hcl, and Plavix [clopidogrel]    Review of Systems  All pertinent systems have been reviewed and are negative except for what is stated in the history of present illness.    All other systems have been reviewed and are negative and noncontributory to this patient's current ailments.     Visit Vitals  /62 (BP Location: Right arm, Patient Position: Sitting, BP Cuff Size: Adult)   Pulse 65   Ht 1.803 m (5' 11\")   Wt 91.2 kg (201 lb)   BMI 28.03 kg/m²   Smoking Status Former   BSA 2.14 m²         Objective   Vitals reviewed.   Constitutional:       Appearance: Healthy appearance. Not in distress.   Neck:      Vascular: No JVR. JVD normal.   Pulmonary:      Effort: Pulmonary effort is normal.      Breath sounds: Normal breath sounds. No wheezing. No rhonchi. No rales.   Chest:      Chest wall: Not tender to palpatation.   Cardiovascular:      PMI at left midclavicular line. Normal rate. Regular rhythm. Normal S1. Normal S2.       Murmurs: There is no murmur.      No gallop.  No click. No rub.   Edema:     Peripheral edema absent.   Abdominal:      General: Bowel sounds are normal.      Palpations: Abdomen is soft.      Tenderness: There is no abdominal tenderness.   Musculoskeletal: Normal range of motion.         General: No tenderness. Skin:     General: Skin is warm and dry.   Neurological:      General: No focal deficit present.      Mental Status: Alert and oriented to person, place and time.         Last Labs:  CBC -  Lab Results   Component Value Date    WBC 10.9 " 12/08/2023    HGB 15.6 12/08/2023    HCT 49.8 12/08/2023    MCV 95 12/08/2023     12/08/2023       CMP -  Lab Results   Component Value Date    CALCIUM 9.2 12/08/2023    PHOS 4.4 03/31/2021    PROT 6.5 04/04/2023    ALBUMIN 4.2 04/04/2023    AST 20 04/04/2023    ALT 36 12/08/2023    ALKPHOS 53 04/04/2023    BILITOT 0.7 04/04/2023       LIPID PANEL -   Lab Results   Component Value Date    CHOL 137 12/08/2023    TRIG 59 12/08/2023    HDL 68.5 12/08/2023    CHHDL 2.0 12/08/2023    LDLF 69 04/04/2023    VLDL 12 12/08/2023    NHDL 69 12/08/2023       RENAL FUNCTION PANEL -   Lab Results   Component Value Date    GLUCOSE 83 12/08/2023     12/08/2023    K 4.8 12/08/2023     12/08/2023    CO2 27 12/08/2023    ANIONGAP 12 12/08/2023    BUN 26 (H) 12/08/2023    CREATININE 1.44 (H) 12/08/2023    GFRMALE 54 (A) 04/04/2023    CALCIUM 9.2 12/08/2023    PHOS 4.4 03/31/2021    ALBUMIN 4.2 04/04/2023        Lab Results   Component Value Date    HGBA1C 5.9 (H) 12/08/2023     Assessment/Plan   Diagnoses and all orders for this visit:  PVC (premature ventricular contraction)  - stable, rare  - continue current medications (metoprolol)  - loop recorder   Coronary artery disease involving native coronary artery of native heart with angina pectoris (CMS/[facility])  - denies CP  - exercising regularly   - continue statin/asa  - very well controlled  - LDL: 57  Essential hypertension  - well controlled  - continue current meds  - Cr 1.44, stable   Hyperlipidemia, unspecified hyperlipidemia type  - very well controlled  - continue statin      Current Outpatient Medications:     albuterol 90 mcg/actuation inhaler, Inhale 2 puffs every 6 hours if needed., Disp: , Rfl:     amLODIPine (Norvasc) 5 mg tablet, Take by mouth twice a day., Disp: , Rfl:     Anoro Ellipta 62.5-25 mcg/actuation blister with device, Inhale 1 puff once daily., Disp: , Rfl:     aspirin 81 mg capsule, Take 1 tablet by mouth once daily., Disp: , Rfl:      atorvastatin (Lipitor) 40 mg tablet, Take 1 tablet (40 mg) by mouth once daily., Disp: , Rfl:     azelastine (Astelin) 137 mcg (0.1 %) nasal spray, Administer 2 sprays into each nostril once daily., Disp: , Rfl:     beneprotein, Take by mouth once daily., Disp: , Rfl:     buPROPion (Wellbutrin) 100 mg tablet, Take 1 tablet (100 mg) by mouth once daily in the morning. Take before meals., Disp: , Rfl:     calcium carbonate (Tums) 200 mg calcium chewable tablet, Chew 1 tablet (500 mg) once daily., Disp: , Rfl:     cetirizine (ZyrTEC) 10 mg tablet, Take 1 tablet (10 mg) by mouth once daily., Disp: , Rfl:     cholecalciferol (Vitamin D-3) 125 MCG (5000 UT) capsule, Take 1 capsule (125 mcg) by mouth once daily., Disp: , Rfl:     cholestyramine light (Prevalite) 4 gram packet, Take by mouth., Disp: , Rfl:     clonazePAM (KlonoPIN) 0.5 mg tablet, Take 1 tablet (0.5 mg) by mouth every 12 hours if needed., Disp: , Rfl:     famotidine (Pepcid) 20 mg tablet, TAKE 1 TABLET BY MOUTH EVERYDAY AT BEDTIME, Disp: 90 tablet, Rfl: 1    fish oil concentrate (Omega-3) 120-180 mg capsule, Take 1 capsule (1 g) by mouth once daily., Disp: , Rfl:     fluticasone (Flonase) 50 mcg/actuation nasal spray, Administer 1 spray into each nostril once daily., Disp: , Rfl:     hydrOXYzine HCL (Atarax) 25 mg tablet, Take 1 tablet (25 mg) by mouth once daily at bedtime., Disp: , Rfl:     ipratropium (Atrovent) 21 mcg (0.03 %) nasal spray, USE 2 APPLICATIONS IN INTO EACH NOSTRIL TWICE A DAY AS NEEDED, Disp: , Rfl:     levothyroxine (Synthroid, Levoxyl) 88 mcg tablet, TAKE 1 TABLET BY MOUTH EVERY DAY, Disp: 90 tablet, Rfl: 0    losartan (Cozaar) 50 mg tablet, TAKE 1 TABLET BY MOUTH TWICE A DAY, Disp: 180 tablet, Rfl: 1    magnesium gluconate (Magonate) 27.5 mg magne- sium (500 mg) tablet, Take 200 mg by mouth once daily., Disp: , Rfl:     magnesium oxide-Mg AA chelate (Magnesium, oxide/AA chelate,) 300 mg capsule, once every 24 hours., Disp: , Rfl:      melatonin 5 mg tablet,chewable, Chew., Disp: , Rfl:     metoprolol succinate XL (Kapspargo Sprinkle) 25 mg 24 hr capsule, Take 2 capsules (50 mg) by mouth once daily., Disp: , Rfl:     predniSONE (Deltasone) 10 mg tablet, 1 tablet (10 mg) once every 24 hours., Disp: , Rfl:     spironolactone (Aldactone) 25 mg tablet, Take 0.5 tablets (12.5 mg) by mouth once daily., Disp: , Rfl:     tadalafil (Cialis) 20 mg tablet, Take 1 tablet (20 mg) by mouth once daily as needed for erectile dysfunction., Disp: 12 tablet, Rfl: 3    famotidine (Pepcid) 40 mg tablet, Take 1 tablet (40 mg) by mouth once every 24 hours., Disp: , Rfl:

## 2023-12-13 ENCOUNTER — OFFICE VISIT (OUTPATIENT)
Dept: PRIMARY CARE | Facility: CLINIC | Age: 73
End: 2023-12-13
Payer: MEDICARE

## 2023-12-13 VITALS
DIASTOLIC BLOOD PRESSURE: 70 MMHG | TEMPERATURE: 97.7 F | WEIGHT: 200 LBS | BODY MASS INDEX: 27.89 KG/M2 | SYSTOLIC BLOOD PRESSURE: 118 MMHG

## 2023-12-13 DIAGNOSIS — E78.5 HYPERLIPIDEMIA, UNSPECIFIED HYPERLIPIDEMIA TYPE: ICD-10-CM

## 2023-12-13 DIAGNOSIS — E03.9 HYPOTHYROIDISM, UNSPECIFIED TYPE: ICD-10-CM

## 2023-12-13 DIAGNOSIS — N18.31 STAGE 3A CHRONIC KIDNEY DISEASE (MULTI): Primary | ICD-10-CM

## 2023-12-13 DIAGNOSIS — K21.9 GASTROESOPHAGEAL REFLUX DISEASE, UNSPECIFIED WHETHER ESOPHAGITIS PRESENT: ICD-10-CM

## 2023-12-13 DIAGNOSIS — I10 ESSENTIAL HYPERTENSION: ICD-10-CM

## 2023-12-13 PROBLEM — N28.9 RENAL INSUFFICIENCY: Status: RESOLVED | Noted: 2023-03-24 | Resolved: 2023-12-13

## 2023-12-13 PROCEDURE — 1036F TOBACCO NON-USER: CPT | Performed by: INTERNAL MEDICINE

## 2023-12-13 PROCEDURE — 1159F MED LIST DOCD IN RCRD: CPT | Performed by: INTERNAL MEDICINE

## 2023-12-13 PROCEDURE — 1160F RVW MEDS BY RX/DR IN RCRD: CPT | Performed by: INTERNAL MEDICINE

## 2023-12-13 PROCEDURE — 3078F DIAST BP <80 MM HG: CPT | Performed by: INTERNAL MEDICINE

## 2023-12-13 PROCEDURE — 1126F AMNT PAIN NOTED NONE PRSNT: CPT | Performed by: INTERNAL MEDICINE

## 2023-12-13 PROCEDURE — 99214 OFFICE O/P EST MOD 30 MIN: CPT | Performed by: INTERNAL MEDICINE

## 2023-12-13 PROCEDURE — 3074F SYST BP LT 130 MM HG: CPT | Performed by: INTERNAL MEDICINE

## 2023-12-13 RX ORDER — FAMOTIDINE 40 MG/1
40 TABLET, FILM COATED ORAL EVERY 24 HOURS
Qty: 90 TABLET | Refills: 3 | Status: SHIPPED | OUTPATIENT
Start: 2023-12-13 | End: 2024-12-12

## 2023-12-13 RX ORDER — LEVOTHYROXINE SODIUM 88 UG/1
88 TABLET ORAL DAILY
Qty: 90 TABLET | Refills: 3 | Status: SHIPPED | OUTPATIENT
Start: 2023-12-13

## 2023-12-13 ASSESSMENT — PATIENT HEALTH QUESTIONNAIRE - PHQ9
SUM OF ALL RESPONSES TO PHQ9 QUESTIONS 1 AND 2: 0
1. LITTLE INTEREST OR PLEASURE IN DOING THINGS: NOT AT ALL
2. FEELING DOWN, DEPRESSED OR HOPELESS: NOT AT ALL

## 2023-12-13 NOTE — PROGRESS NOTES
Subjective   Patient ID: Ferny Roman is a 73 y.o. male who presents for f/u      HPI   Overall well   Note 7/31/23 reviewed  #1 COPD-follows with pulmonology.  Clinically doing well.    #2 CAD- f/u  dr Love/Caro Alejandra-note from yesterday reviewed.  #3 CVA- remote.  No clear etology.  Neg cards eval for afib.  f/u  neuro/cards  #4 lipids-on treatment without difficulty.  #5 depression/anxiety-overall mood has been good  #6 insomnia-relatively stable with melatonin.  #7 IFBS-tries to keep sugar and carbs limited.  #8 CKD3- retest 10/23.  No NSAIDS reviewed.   #9 subclinical hypothyroid- retest 10/23  #10 hypertension-no headache chest pain or dizziness.  #11 GERD-controlled.  No dysphagia.    Review of Systems  All systems reviewed and negative except as per history of present illness  Objective   There were no vitals taken for this visit.    Physical Exam  Constitutional:       Appearance: Normal appearance.   Cardiovascular:      Rate and Rhythm: Normal rate and regular rhythm.      Pulses: Normal pulses.      Heart sounds: No murmur heard.     No gallop.   Pulmonary:      Effort: Pulmonary effort is normal. No respiratory distress.      Breath sounds: Normal breath sounds. No wheezing, rhonchi or rales.   Neurological:      Mental Status: He is alert.   Psychiatric:         Mood and Affect: Mood normal.         Behavior: Behavior normal.         Thought Content: Thought content normal.         Judgment: Judgment normal.          Lab Results   Component Value Date    WBC 10.9 12/08/2023    HGB 15.6 12/08/2023    HCT 49.8 12/08/2023     12/08/2023    CHOL 137 12/08/2023    TRIG 59 12/08/2023    HDL 68.5 12/08/2023    ALT 36 12/08/2023    AST 20 04/04/2023     12/08/2023    K 4.8 12/08/2023     12/08/2023    CREATININE 1.44 (H) 12/08/2023    BUN 26 (H) 12/08/2023    CO2 27 12/08/2023    TSH 3.11 12/08/2023    PSA 2.13 12/08/2023    INR 1.1 12/11/2019    HGBA1C 5.9 (H) 12/08/2023        Assessment/Plan     #1 COPD-clinically stable.  I do not have details.  Continue inhalers and as needed prednisone per pulmonology.  F/u  dr Wright.    #2 CAD-clinically stable.  F/u  dr Love/Caro Alejandra.   #3 CVA- remote.  No clear etology.  Neg cards eval for afib.  f/u  neuro/cards  #4 lipids-good, continue treatment   #5 depression/anxiety- f/u psychiatry  #6 insomnia- f/u  w/ psychiatry. Con't melatonin.      #7 IFBS-reviewed.  Stable. Low sugar and carbohydrate diet with exercise reviewed.  Check A1c 6 months  #8 CKD3-stable by 1 year.  Check urinary microalbumin.  No NSAIDs reviewed   #9 subclinical hypothyroid- retest 6 to 12 months   #10 hypertension-good.  Continue treatment  #11 GERD- s/p remote EGD (2-3 yrs ptv???).  Will increase pepcid bid x 2 weeks, resume every day.  GI f/up pending  #12 seasonal allergic rhinitis-significant.  Continue current treatment.       [place] 2022--> 2027

## 2024-01-04 ENCOUNTER — LAB (OUTPATIENT)
Dept: LAB | Facility: LAB | Age: 74
End: 2024-01-04
Payer: MEDICARE

## 2024-01-04 DIAGNOSIS — N18.31 STAGE 3A CHRONIC KIDNEY DISEASE (MULTI): ICD-10-CM

## 2024-01-04 LAB
CREAT UR-MCNC: 70.7 MG/DL (ref 20–370)
MICROALBUMIN UR-MCNC: 34 MG/L
MICROALBUMIN/CREAT UR: 48.1 UG/MG CREAT

## 2024-01-04 PROCEDURE — 82043 UR ALBUMIN QUANTITATIVE: CPT

## 2024-01-04 PROCEDURE — 82570 ASSAY OF URINE CREATININE: CPT

## 2024-01-09 ENCOUNTER — OFFICE VISIT (OUTPATIENT)
Dept: PRIMARY CARE | Facility: CLINIC | Age: 74
End: 2024-01-09
Payer: MEDICARE

## 2024-01-09 VITALS
SYSTOLIC BLOOD PRESSURE: 110 MMHG | BODY MASS INDEX: 28 KG/M2 | TEMPERATURE: 97.7 F | DIASTOLIC BLOOD PRESSURE: 52 MMHG | OXYGEN SATURATION: 98 % | HEART RATE: 66 BPM | WEIGHT: 200 LBS | HEIGHT: 71 IN

## 2024-01-09 DIAGNOSIS — N52.9 ERECTILE DYSFUNCTION, UNSPECIFIED ERECTILE DYSFUNCTION TYPE: ICD-10-CM

## 2024-01-09 DIAGNOSIS — R39.11 URINARY HESITANCY: Primary | ICD-10-CM

## 2024-01-09 DIAGNOSIS — R30.0 DYSURIA: ICD-10-CM

## 2024-01-09 PROBLEM — H81.10 BENIGN PAROXYSMAL POSITIONAL VERTIGO: Status: ACTIVE | Noted: 2024-01-09

## 2024-01-09 PROBLEM — N32.0 ACQUIRED BLADDER NECK OBSTRUCTION: Status: ACTIVE | Noted: 2024-01-09

## 2024-01-09 PROCEDURE — 1036F TOBACCO NON-USER: CPT | Performed by: NURSE PRACTITIONER

## 2024-01-09 PROCEDURE — 1126F AMNT PAIN NOTED NONE PRSNT: CPT | Performed by: NURSE PRACTITIONER

## 2024-01-09 PROCEDURE — 99214 OFFICE O/P EST MOD 30 MIN: CPT | Performed by: NURSE PRACTITIONER

## 2024-01-09 PROCEDURE — 1160F RVW MEDS BY RX/DR IN RCRD: CPT | Performed by: NURSE PRACTITIONER

## 2024-01-09 PROCEDURE — 3078F DIAST BP <80 MM HG: CPT | Performed by: NURSE PRACTITIONER

## 2024-01-09 PROCEDURE — 1159F MED LIST DOCD IN RCRD: CPT | Performed by: NURSE PRACTITIONER

## 2024-01-09 PROCEDURE — 3074F SYST BP LT 130 MM HG: CPT | Performed by: NURSE PRACTITIONER

## 2024-01-09 ASSESSMENT — ENCOUNTER SYMPTOMS
CONSTITUTIONAL NEGATIVE: 1
RESPIRATORY NEGATIVE: 1
CARDIOVASCULAR NEGATIVE: 1

## 2024-01-09 ASSESSMENT — PATIENT HEALTH QUESTIONNAIRE - PHQ9
2. FEELING DOWN, DEPRESSED OR HOPELESS: NOT AT ALL
SUM OF ALL RESPONSES TO PHQ9 QUESTIONS 1 AND 2: 0
1. LITTLE INTEREST OR PLEASURE IN DOING THINGS: NOT AT ALL

## 2024-01-09 NOTE — PROGRESS NOTES
"Subjective   Patient ID: Ferny Roman is a 73 y.o. male who presents for Difficulty Urinating (Trouble getting his urinary flow started, c/o night sweats).    HPI Presents today with 3-4 day of difficulty starting a urine flow. No burning or frequency.  Had this two years ago.  Had ultrasound of the bladder as noted.  Symptoms resolve on their own and was fine until 3-4 days ago.  Denies decrease in flow   Gets up twice a night to urinate.   Seems to be easing in the last 10 hours  Has had some night sweat in the last week but he also has had a new electric blanket  Patient is interested in trying Cialis for BPH if urine is clear  Is thinking this may help his ED as well.  He and wife have not been currently sexually active but have been thinking about starting again( wifes libido decreased because of medication)  Review of Systems   Constitutional: Negative.    Respiratory: Negative.     Cardiovascular: Negative.    Genitourinary:         As noted in HPI         Objective   /52 (BP Location: Left arm, Patient Position: Sitting)   Pulse 66   Temp 36.5 °C (97.7 °F) (Temporal)   Ht 1.803 m (5' 11\")   Wt 90.7 kg (200 lb)   SpO2 98%   BMI 27.89 kg/m²     Physical Exam  Constitutional:       Appearance: Normal appearance.   Cardiovascular:      Rate and Rhythm: Normal rate and regular rhythm.   Pulmonary:      Effort: Pulmonary effort is normal.      Breath sounds: Normal breath sounds.   Abdominal:      Tenderness: There is no right CVA tenderness or left CVA tenderness.   Musculoskeletal:         General: Normal range of motion.   Neurological:      General: No focal deficit present.      Mental Status: He is alert.   Psychiatric:         Mood and Affect: Mood normal.         Behavior: Behavior normal.         Assessment/Plan   Problem List Items Addressed This Visit    None  Visit Diagnoses         Codes    Dysuria    -  Primary R30.0    Relevant Orders    Urinalysis with Reflex Microscopic    Urine " Culture

## 2024-01-10 ENCOUNTER — LAB (OUTPATIENT)
Dept: LAB | Facility: LAB | Age: 74
End: 2024-01-10
Payer: MEDICARE

## 2024-01-10 DIAGNOSIS — R30.0 DYSURIA: ICD-10-CM

## 2024-01-10 LAB
APPEARANCE UR: CLEAR
BACTERIA #/AREA URNS AUTO: ABNORMAL /HPF
BILIRUB UR STRIP.AUTO-MCNC: NEGATIVE MG/DL
COLOR UR: YELLOW
GLUCOSE UR STRIP.AUTO-MCNC: NEGATIVE MG/DL
KETONES UR STRIP.AUTO-MCNC: NEGATIVE MG/DL
LEUKOCYTE ESTERASE UR QL STRIP.AUTO: ABNORMAL
MUCOUS THREADS #/AREA URNS AUTO: ABNORMAL /LPF
NITRITE UR QL STRIP.AUTO: NEGATIVE
PH UR STRIP.AUTO: 7 [PH]
PROT UR STRIP.AUTO-MCNC: NEGATIVE MG/DL
RBC # UR STRIP.AUTO: NEGATIVE /UL
RBC #/AREA URNS AUTO: ABNORMAL /HPF
SP GR UR STRIP.AUTO: 1.01
UROBILINOGEN UR STRIP.AUTO-MCNC: <2 MG/DL
WBC #/AREA URNS AUTO: ABNORMAL /HPF
WBC CLUMPS #/AREA URNS AUTO: ABNORMAL /HPF

## 2024-01-10 PROCEDURE — 87186 SC STD MICRODIL/AGAR DIL: CPT

## 2024-01-10 PROCEDURE — 87086 URINE CULTURE/COLONY COUNT: CPT

## 2024-01-10 PROCEDURE — 81001 URINALYSIS AUTO W/SCOPE: CPT

## 2024-01-11 DIAGNOSIS — R30.0 DYSURIA: Primary | ICD-10-CM

## 2024-01-11 RX ORDER — NITROFURANTOIN 25; 75 MG/1; MG/1
100 CAPSULE ORAL 2 TIMES DAILY
Qty: 10 CAPSULE | Refills: 0 | Status: SHIPPED | OUTPATIENT
Start: 2024-01-11 | End: 2024-01-16

## 2024-01-12 ENCOUNTER — TELEPHONE (OUTPATIENT)
Dept: PRIMARY CARE | Facility: CLINIC | Age: 74
End: 2024-01-12
Payer: MEDICARE

## 2024-01-12 LAB — BACTERIA UR CULT: ABNORMAL

## 2024-01-12 NOTE — TELEPHONE ENCOUNTER
----- Message from DAVE Adorno sent at 1/12/2024  1:15 PM EST -----  Please let patient know his urine culture did verify infection and that the medication I put him on should take care of it.  Please make sure he picked up the prescription.  WG  ----- Message -----  From: Lab, Background User  Sent: 1/10/2024   4:17 PM EST  To: DAVE Adorno

## 2024-01-12 NOTE — TELEPHONE ENCOUNTER
Spoke with patient. He states he took his first dose yesterday at 4:30pm and second dose at 5am. States he feels pretty crummy and a little nauseated on the medication. He also read this medication isnt good for elderly people, it could cause breathing and kidney issues. I advised with Dr. Chowdhury, he states if patient wants he can switch to Augmentin but its a stronger medication and can cause an upset stomach/diarrhea. Spoke with patient he will stay on macrobid. He will call if not better or develops other symptoms

## 2024-01-28 DIAGNOSIS — I10 ESSENTIAL (PRIMARY) HYPERTENSION: ICD-10-CM

## 2024-01-29 RX ORDER — AMLODIPINE BESYLATE 5 MG/1
5 TABLET ORAL DAILY
Qty: 90 TABLET | Refills: 3 | Status: SHIPPED | OUTPATIENT
Start: 2024-01-29

## 2024-02-06 ENCOUNTER — DOCUMENTATION (OUTPATIENT)
Dept: PHYSICAL THERAPY | Facility: CLINIC | Age: 74
End: 2024-02-06
Payer: MEDICARE

## 2024-02-06 NOTE — PROGRESS NOTES
"Physical Therapy    Discharge Summary    Name: Juan Jose Roman \"Ferny\"  MRN: [id number]  : 1950  Date: 24    Discharge Summary: PT    Discharge Information: Date of discharge 24, Date of last visit 23, Date of evaluation 23, Number of attended visits 2, Referred by Dr. Aly Christian, and Referred for L hip pain    Therapy Summary: Patient was seen for 2 visits of PT with instruction in home exercises for hip/core strength and LE flexibility    Discharge Status: Patient continuing on an independent basis with home ex program     Rehab Discharge Reason: Achieved all and/or the most significant goals(s)  "

## 2024-02-09 DIAGNOSIS — I10 BENIGN ESSENTIAL HYPERTENSION: ICD-10-CM

## 2024-02-09 DIAGNOSIS — I25.10 ATHEROSCLEROTIC HEART DISEASE OF NATIVE CORONARY ARTERY WITHOUT ANGINA PECTORIS: ICD-10-CM

## 2024-02-12 RX ORDER — SPIRONOLACTONE 25 MG/1
12.5 TABLET ORAL DAILY
Qty: 45 TABLET | Refills: 3 | Status: SHIPPED | OUTPATIENT
Start: 2024-02-12

## 2024-02-19 ENCOUNTER — HOSPITAL ENCOUNTER (OUTPATIENT)
Dept: CARDIOLOGY | Facility: CLINIC | Age: 74
Discharge: HOME | End: 2024-02-19
Payer: MEDICARE

## 2024-02-19 DIAGNOSIS — I63.9 CEREBRAL INFARCTION, UNSPECIFIED (MULTI): ICD-10-CM

## 2024-02-19 PROCEDURE — 93298 REM INTERROG DEV EVAL SCRMS: CPT | Performed by: INTERNAL MEDICINE

## 2024-02-19 PROCEDURE — 93298 REM INTERROG DEV EVAL SCRMS: CPT

## 2024-03-27 ENCOUNTER — HOSPITAL ENCOUNTER (OUTPATIENT)
Dept: CARDIOLOGY | Facility: CLINIC | Age: 74
Discharge: HOME | End: 2024-03-27
Payer: MEDICARE

## 2024-03-27 DIAGNOSIS — I63.9 CEREBRAL INFARCTION, UNSPECIFIED (MULTI): ICD-10-CM

## 2024-04-18 DIAGNOSIS — E78.2 MIXED HYPERLIPIDEMIA: ICD-10-CM

## 2024-04-18 RX ORDER — ATORVASTATIN CALCIUM 40 MG/1
40 TABLET, FILM COATED ORAL DAILY
Qty: 90 TABLET | Refills: 2 | Status: SHIPPED | OUTPATIENT
Start: 2024-04-18

## 2024-05-08 ENCOUNTER — HOSPITAL ENCOUNTER (OUTPATIENT)
Dept: CARDIOLOGY | Facility: CLINIC | Age: 74
Discharge: HOME | End: 2024-05-08
Payer: MEDICARE

## 2024-05-08 DIAGNOSIS — I63.9 CEREBRAL INFARCTION, UNSPECIFIED (MULTI): ICD-10-CM

## 2024-05-12 DIAGNOSIS — I10 BENIGN ESSENTIAL HYPERTENSION: ICD-10-CM

## 2024-05-13 RX ORDER — LOSARTAN POTASSIUM 50 MG/1
50 TABLET ORAL 2 TIMES DAILY
Qty: 180 TABLET | Refills: 2 | Status: SHIPPED | OUTPATIENT
Start: 2024-05-13

## 2024-05-14 ENCOUNTER — TELEPHONE (OUTPATIENT)
Dept: CARDIOLOGY | Facility: CLINIC | Age: 74
End: 2024-05-14
Payer: MEDICARE

## 2024-05-20 ENCOUNTER — HOSPITAL ENCOUNTER (OUTPATIENT)
Dept: CARDIOLOGY | Facility: CLINIC | Age: 74
Discharge: HOME | End: 2024-05-20
Payer: MEDICARE

## 2024-05-20 DIAGNOSIS — I63.9 CEREBRAL INFARCTION, UNSPECIFIED (MULTI): ICD-10-CM

## 2024-05-20 PROCEDURE — 93298 REM INTERROG DEV EVAL SCRMS: CPT

## 2024-05-20 PROCEDURE — 93298 REM INTERROG DEV EVAL SCRMS: CPT | Performed by: INTERNAL MEDICINE

## 2024-05-21 ENCOUNTER — OFFICE VISIT (OUTPATIENT)
Dept: CARDIOLOGY | Facility: CLINIC | Age: 74
End: 2024-05-21
Payer: MEDICARE

## 2024-05-21 VITALS
WEIGHT: 192 LBS | BODY MASS INDEX: 26.78 KG/M2 | DIASTOLIC BLOOD PRESSURE: 63 MMHG | SYSTOLIC BLOOD PRESSURE: 100 MMHG | HEART RATE: 84 BPM

## 2024-05-21 DIAGNOSIS — R06.09 DOE (DYSPNEA ON EXERTION): ICD-10-CM

## 2024-05-21 DIAGNOSIS — J44.9 CHRONIC OBSTRUCTIVE PULMONARY DISEASE, UNSPECIFIED COPD TYPE (MULTI): ICD-10-CM

## 2024-05-21 DIAGNOSIS — I63.9 CEREBRAL INFARCTION, UNSPECIFIED MECHANISM (MULTI): ICD-10-CM

## 2024-05-21 DIAGNOSIS — I49.3 PVC (PREMATURE VENTRICULAR CONTRACTION): ICD-10-CM

## 2024-05-21 DIAGNOSIS — I25.119 CORONARY ARTERY DISEASE INVOLVING NATIVE CORONARY ARTERY OF NATIVE HEART WITH ANGINA PECTORIS (CMS-HCC): Primary | ICD-10-CM

## 2024-05-21 DIAGNOSIS — E78.5 HYPERLIPIDEMIA, UNSPECIFIED HYPERLIPIDEMIA TYPE: ICD-10-CM

## 2024-05-21 DIAGNOSIS — I70.0 ATHEROSCLEROSIS OF AORTA (CMS-HCC): ICD-10-CM

## 2024-05-21 DIAGNOSIS — I10 ESSENTIAL HYPERTENSION: ICD-10-CM

## 2024-05-21 DIAGNOSIS — I73.9 PAD (PERIPHERAL ARTERY DISEASE) (CMS-HCC): ICD-10-CM

## 2024-05-21 PROCEDURE — 1160F RVW MEDS BY RX/DR IN RCRD: CPT | Performed by: NURSE PRACTITIONER

## 2024-05-21 PROCEDURE — 1036F TOBACCO NON-USER: CPT | Performed by: NURSE PRACTITIONER

## 2024-05-21 PROCEDURE — 3078F DIAST BP <80 MM HG: CPT | Performed by: NURSE PRACTITIONER

## 2024-05-21 PROCEDURE — 99214 OFFICE O/P EST MOD 30 MIN: CPT | Performed by: NURSE PRACTITIONER

## 2024-05-21 PROCEDURE — 1159F MED LIST DOCD IN RCRD: CPT | Performed by: NURSE PRACTITIONER

## 2024-05-21 PROCEDURE — 3074F SYST BP LT 130 MM HG: CPT | Performed by: NURSE PRACTITIONER

## 2024-05-21 NOTE — PROGRESS NOTES
"Chief Complaint:   PVC     History Of Present Illness:    Juan Jose Roman \"Wanda" is a 74 y.o. male here with history of PVC, undergone RFA x 2.  Rare palpitations.   Exercising 4-5x times a week.   Has issues with legs walking. Cannot get as far as used to. C/O burning at night.   Going up steps still getting winded. Feels like they should be getting easier, due to exercising, but they are not.     No sonographic evidence of abdominal aortic aneurysm-4/12/23  Frequent PVC's/RFA 6/10/15 [Severe RCA - EMILY] - 4/24/2015  PVCs [RFA] - 12/20/2019  Cardiac Calcium Score (Total 722:) - 1/2008    MPI (Negative for ischemia. Moderate LV thickness. Mild AVR) - 6/1/2022    Brother aortic dissection.     Allergies:  Tetracycline, Tetracycline hcl, Valsartan-hydrochlorothiazide, and Plavix [clopidogrel]    Review of Systems  All pertinent systems have been reviewed and are negative except for what is stated in the history of present illness.    All other systems have been reviewed and are negative and noncontributory to this patient's current ailments.     Visit Vitals  /63 (BP Location: Right arm, Patient Position: Sitting, BP Cuff Size: Adult)   Pulse 84   Wt 87.1 kg (192 lb)   BMI 26.78 kg/m²   Smoking Status Former   BSA 2.09 m²       Objective   Vitals reviewed.   Constitutional:       Appearance: Healthy appearance. Not in distress.   Neck:      Vascular: No JVR. JVD normal.   Pulmonary:      Effort: Pulmonary effort is normal.      Breath sounds: Normal breath sounds. No wheezing. No rhonchi. No rales.   Chest:      Chest wall: Not tender to palpatation.   Cardiovascular:      PMI at left midclavicular line. Normal rate. Regular rhythm. Normal S1. Normal S2.       Murmurs: There is no murmur.      No gallop.  No click. No rub.   Edema:     Peripheral edema absent.   Abdominal:      General: Bowel sounds are normal.      Palpations: Abdomen is soft.      Tenderness: There is no abdominal tenderness.   Musculoskeletal: " Normal range of motion.         General: No tenderness. Skin:     General: Skin is warm and dry.   Neurological:      General: No focal deficit present.      Mental Status: Alert and oriented to person, place and time.   Psychiatric:         Attention and Perception: Attention normal.         Mood and Affect: Mood normal.       Last Labs:  CBC -  Lab Results   Component Value Date    WBC 10.9 12/08/2023    HGB 15.6 12/08/2023    HCT 49.8 12/08/2023    MCV 95 12/08/2023     12/08/2023       CMP -  Lab Results   Component Value Date    CALCIUM 9.2 12/08/2023    PHOS 4.4 03/31/2021    PROT 6.5 04/04/2023    ALBUMIN 4.2 04/04/2023    AST 20 04/04/2023    ALT 36 12/08/2023    ALKPHOS 53 04/04/2023    BILITOT 0.7 04/04/2023    BUN 26 (H) 12/08/2023    CREATININE 1.44 (H) 12/08/2023       LIPID PANEL -   Lab Results   Component Value Date    CHOL 137 12/08/2023    TRIG 59 12/08/2023    HDL 68.5 12/08/2023    CHHDL 2.0 12/08/2023    LDLF 69 04/04/2023    VLDL 12 12/08/2023    NHDL 69 12/08/2023       RENAL FUNCTION PANEL -   Lab Results   Component Value Date    GLUCOSE 83 12/08/2023     12/08/2023    K 4.8 12/08/2023     12/08/2023    CO2 27 12/08/2023    ANIONGAP 12 12/08/2023    BUN 26 (H) 12/08/2023    CREATININE 1.44 (H) 12/08/2023    GFRMALE 54 (A) 04/04/2023    CALCIUM 9.2 12/08/2023    PHOS 4.4 03/31/2021    ALBUMIN 4.2 04/04/2023        Lab Results   Component Value Date    HGBA1C 5.9 (H) 12/08/2023         Assessment/Plan   Diagnoses and all orders for this visit:  Coronary artery disease involving native coronary artery of native heart with angina pectoris (CMS-[facility])  - EKG NSR 86bpm, no ischemic changes  - denies chest pain and SOB  - statin/asa  PVC (premature ventricular contraction)  - well controlled   - loop recorder  Essential hypertension  - well controlled  Hyperlipidemia, unspecified hyperlipidemia type  - statin  Cerebral infarction, unspecified mechanism (Multi)  - statin/asa  - loop  recorder  CASON (dyspnea on exertion)  - Transthoracic echo (TTE) complete  - with climbing stairs  - suspect 2/2 COPD  PAD (peripheral artery disease) (CMS-HCC)  - known disease in aorta and iliac due to burning at night and LE fatigue with walking we will check vascular US aorta iliac duplex complete  Atherosclerosis of aorta (CMS-HCC)  - noted on previous US  - Vascular US aorta iliac duplex complete  Chronic obstructive pulmonary disease, unspecified COPD type (Multi)  - per pulmonology     Follow up post echo and US    Current Outpatient Medications:     albuterol 90 mcg/actuation inhaler, Inhale 2 puffs every 6 hours if needed., Disp: , Rfl:     amLODIPine (Norvasc) 5 mg tablet, TAKE 1 TABLET BY MOUTH EVERY DAY, Disp: 90 tablet, Rfl: 3    Anoro Ellipta 62.5-25 mcg/actuation blister with device, Inhale 1 puff once daily., Disp: , Rfl:     aspirin 81 mg capsule, Take 1 tablet by mouth once daily., Disp: , Rfl:     atorvastatin (Lipitor) 40 mg tablet, TAKE 1 TABLET BY MOUTH EVERY DAY, Disp: 90 tablet, Rfl: 2    azelastine (Astelin) 137 mcg (0.1 %) nasal spray, Administer 2 sprays into each nostril once daily., Disp: , Rfl:     beneprotein, Take by mouth once daily., Disp: , Rfl:     buPROPion (Wellbutrin) 100 mg tablet, Take 1 tablet (100 mg) by mouth once daily in the morning. Take before meals., Disp: , Rfl:     calcium carbonate (Tums) 200 mg calcium chewable tablet, Chew 1 tablet (500 mg) once daily., Disp: , Rfl:     cetirizine (ZyrTEC) 10 mg tablet, Take 1 tablet (10 mg) by mouth once daily., Disp: , Rfl:     cholecalciferol (Vitamin D-3) 125 MCG (5000 UT) capsule, Take 1 capsule (125 mcg) by mouth once daily., Disp: , Rfl:     cholestyramine light (Prevalite) 4 gram packet, Take by mouth., Disp: , Rfl:     clonazePAM (KlonoPIN) 0.5 mg tablet, Take 1 tablet (0.5 mg) by mouth every 12 hours if needed., Disp: , Rfl:     famotidine (Pepcid) 40 mg tablet, Take 1 tablet (40 mg) by mouth once every 24 hours., Disp:  90 tablet, Rfl: 3    fish oil concentrate (Omega-3) 120-180 mg capsule, Take 1 capsule (1 g) by mouth once daily., Disp: , Rfl:     fluticasone (Flonase) 50 mcg/actuation nasal spray, Administer 1 spray into each nostril once daily., Disp: , Rfl:     hydrOXYzine HCL (Atarax) 25 mg tablet, Take 1 tablet (25 mg) by mouth once daily at bedtime., Disp: , Rfl:     ipratropium (Atrovent) 21 mcg (0.03 %) nasal spray, USE 2 APPLICATIONS IN INTO EACH NOSTRIL TWICE A DAY AS NEEDED, Disp: , Rfl:     levothyroxine (Synthroid, Levoxyl) 88 mcg tablet, Take 1 tablet (88 mcg) by mouth once daily., Disp: 90 tablet, Rfl: 3    losartan (Cozaar) 50 mg tablet, TAKE 1 TABLET BY MOUTH TWICE A DAY, Disp: 180 tablet, Rfl: 2    magnesium gluconate (Magonate) 27.5 mg magne- sium (500 mg) tablet, Take 200 mg by mouth once daily., Disp: , Rfl:     melatonin 5 mg tablet,chewable, Chew., Disp: , Rfl:     metoprolol succinate XL (Kapspargo Sprinkle) 25 mg 24 hr capsule, Take 2 capsules (50 mg) by mouth once daily., Disp: , Rfl:     spironolactone (Aldactone) 25 mg tablet, TAKE 1/2 TABLET BY MOUTH EVERY DAY, Disp: 45 tablet, Rfl: 3    tadalafil (Cialis) 20 mg tablet, Take 1 tablet (20 mg) by mouth once daily as needed for erectile dysfunction., Disp: 12 tablet, Rfl: 3

## 2024-05-21 NOTE — PATIENT INSTRUCTIONS
[facility]  [phone]   [facility]    [id number] [address]

## 2024-05-24 ENCOUNTER — HOSPITAL ENCOUNTER (OUTPATIENT)
Dept: RADIOLOGY | Facility: HOSPITAL | Age: 74
Discharge: HOME | End: 2024-05-24
Payer: MEDICARE

## 2024-05-24 DIAGNOSIS — R91.1 PULMONARY NODULE: ICD-10-CM

## 2024-05-24 PROCEDURE — 78815 PET IMAGE W/CT SKULL-THIGH: CPT | Mod: PI

## 2024-05-24 PROCEDURE — 78815 PET IMAGE W/CT SKULL-THIGH: CPT | Mod: PET TUMOR INIT TX STRAT | Performed by: STUDENT IN AN ORGANIZED HEALTH CARE EDUCATION/TRAINING PROGRAM

## 2024-05-24 PROCEDURE — A9552 F18 FDG: HCPCS | Performed by: INTERNAL MEDICINE

## 2024-05-24 PROCEDURE — 3430000001 HC RX 343 DIAGNOSTIC RADIOPHARMACEUTICALS: Performed by: INTERNAL MEDICINE

## 2024-05-24 RX ORDER — FLUDEOXYGLUCOSE F 18 200 MCI/ML
12.8 INJECTION, SOLUTION INTRAVENOUS
Status: DISCONTINUED | OUTPATIENT
Start: 2024-05-24 | End: 2024-05-25 | Stop reason: HOSPADM

## 2024-05-24 RX ORDER — FLUDEOXYGLUCOSE F 18 200 MCI/ML
14 INJECTION, SOLUTION INTRAVENOUS
Status: COMPLETED | OUTPATIENT
Start: 2024-05-24 | End: 2024-05-24

## 2024-05-24 RX ORDER — FLUDEOXYGLUCOSE F 18 200 MCI/ML
14.5 INJECTION, SOLUTION INTRAVENOUS
Status: DISCONTINUED | OUTPATIENT
Start: 2024-05-24 | End: 2024-05-24

## 2024-05-24 RX ADMIN — FLUDEOXYGLUCOSE F 18 14 MILLICURIE: 200 INJECTION, SOLUTION INTRAVENOUS at 09:18

## 2024-06-11 ENCOUNTER — LAB (OUTPATIENT)
Dept: LAB | Facility: LAB | Age: 74
End: 2024-06-11
Payer: MEDICARE

## 2024-06-11 DIAGNOSIS — R94.8 ABNORMAL RESULTS OF FUNCTION STUDIES OF OTHER ORGANS AND SYSTEMS: Primary | ICD-10-CM

## 2024-06-11 DIAGNOSIS — N18.31 STAGE 3A CHRONIC KIDNEY DISEASE (MULTI): ICD-10-CM

## 2024-06-11 DIAGNOSIS — R73.09 ELEVATED GLUCOSE: ICD-10-CM

## 2024-06-11 DIAGNOSIS — N18.31 STAGE 3A CHRONIC KIDNEY DISEASE (MULTI): Primary | ICD-10-CM

## 2024-06-11 LAB
ANION GAP SERPL CALC-SCNC: 10 MMOL/L (ref 10–20)
BUN SERPL-MCNC: 27 MG/DL (ref 6–23)
CALCIUM SERPL-MCNC: 9.2 MG/DL (ref 8.6–10.3)
CHLORIDE SERPL-SCNC: 104 MMOL/L (ref 98–107)
CO2 SERPL-SCNC: 27 MMOL/L (ref 21–32)
CREAT SERPL-MCNC: 1.6 MG/DL (ref 0.5–1.3)
EGFRCR SERPLBLD CKD-EPI 2021: 45 ML/MIN/1.73M*2
EST. AVERAGE GLUCOSE BLD GHB EST-MCNC: 117 MG/DL
GLUCOSE SERPL-MCNC: 107 MG/DL (ref 74–99)
HBA1C MFR BLD: 5.7 %
POTASSIUM SERPL-SCNC: 4.3 MMOL/L (ref 3.5–5.3)
PSA SERPL-MCNC: 1.93 NG/ML
SODIUM SERPL-SCNC: 137 MMOL/L (ref 136–145)

## 2024-06-11 PROCEDURE — 84153 ASSAY OF PSA TOTAL: CPT

## 2024-06-11 PROCEDURE — 80048 BASIC METABOLIC PNL TOTAL CA: CPT

## 2024-06-11 PROCEDURE — 83036 HEMOGLOBIN GLYCOSYLATED A1C: CPT

## 2024-06-11 PROCEDURE — 36415 COLL VENOUS BLD VENIPUNCTURE: CPT

## 2024-06-13 ENCOUNTER — APPOINTMENT (OUTPATIENT)
Dept: PRIMARY CARE | Facility: CLINIC | Age: 74
End: 2024-06-13
Payer: MEDICARE

## 2024-06-13 VITALS — BODY MASS INDEX: 27.14 KG/M2 | WEIGHT: 194.6 LBS | DIASTOLIC BLOOD PRESSURE: 78 MMHG | SYSTOLIC BLOOD PRESSURE: 126 MMHG

## 2024-06-13 DIAGNOSIS — I10 ESSENTIAL HYPERTENSION: ICD-10-CM

## 2024-06-13 DIAGNOSIS — N18.31 STAGE 3A CHRONIC KIDNEY DISEASE (MULTI): ICD-10-CM

## 2024-06-13 DIAGNOSIS — I47.20 VENTRICULAR TACHYCARDIA (MULTI): ICD-10-CM

## 2024-06-13 DIAGNOSIS — E78.5 HYPERLIPIDEMIA, UNSPECIFIED HYPERLIPIDEMIA TYPE: ICD-10-CM

## 2024-06-13 DIAGNOSIS — F33.0 MAJOR DEPRESSIVE DISORDER, RECURRENT, MILD (CMS-HCC): ICD-10-CM

## 2024-06-13 DIAGNOSIS — E03.9 HYPOTHYROIDISM, UNSPECIFIED TYPE: Primary | ICD-10-CM

## 2024-06-13 DIAGNOSIS — G83.14: ICD-10-CM

## 2024-06-13 PROCEDURE — 1170F FXNL STATUS ASSESSED: CPT | Performed by: INTERNAL MEDICINE

## 2024-06-13 PROCEDURE — 1159F MED LIST DOCD IN RCRD: CPT | Performed by: INTERNAL MEDICINE

## 2024-06-13 PROCEDURE — 99214 OFFICE O/P EST MOD 30 MIN: CPT | Performed by: INTERNAL MEDICINE

## 2024-06-13 PROCEDURE — 3074F SYST BP LT 130 MM HG: CPT | Performed by: INTERNAL MEDICINE

## 2024-06-13 PROCEDURE — 1160F RVW MEDS BY RX/DR IN RCRD: CPT | Performed by: INTERNAL MEDICINE

## 2024-06-13 PROCEDURE — 3078F DIAST BP <80 MM HG: CPT | Performed by: INTERNAL MEDICINE

## 2024-06-13 RX ORDER — CYCLOBENZAPRINE HCL 5 MG
TABLET ORAL 3 TIMES DAILY PRN
COMMUNITY

## 2024-06-13 ASSESSMENT — ACTIVITIES OF DAILY LIVING (ADL)
DOING_HOUSEWORK: INDEPENDENT
GROCERY_SHOPPING: INDEPENDENT
MANAGING_FINANCES: INDEPENDENT
TAKING_MEDICATION: INDEPENDENT
DRESSING: INDEPENDENT
BATHING: INDEPENDENT

## 2024-06-13 ASSESSMENT — PATIENT HEALTH QUESTIONNAIRE - PHQ9
SUM OF ALL RESPONSES TO PHQ9 QUESTIONS 1 AND 2: 0
2. FEELING DOWN, DEPRESSED OR HOPELESS: NOT AT ALL
1. LITTLE INTEREST OR PLEASURE IN DOING THINGS: NOT AT ALL
2. FEELING DOWN, DEPRESSED OR HOPELESS: NOT AT ALL
SUM OF ALL RESPONSES TO PHQ9 QUESTIONS 1 AND 2: 0
1. LITTLE INTEREST OR PLEASURE IN DOING THINGS: NOT AT ALL

## 2024-06-13 NOTE — PROGRESS NOTES
Subjective   Reason for Visit: Juan Jose Roman is an 74 y.o. male here for a Medicare Wellness visit.     Past Medical, Surgical, and Family History reviewed and updated in chart.    Reviewed all medications by prescribing practitioner or clinical pharmacist (such as prescriptions, OTCs, herbal therapies and supplements) and documented in the medical record.    HPI    Overall well   Note 12/13/23 reviewed  #1 COPD-follows with pulmonology.  Clinically doing well.    #2 CAD- f/u  dr Love/Caro Alejandra-note from yesterday reviewed.  #3 CVA- remote.  No clear etology.  Neg cards eval for afib.  f/u  neuro/cards  #4 lipids-on treatment without difficulty.  #5 depression/anxiety-overall mood has been good  #6 insomnia-relatively stable with melatonin.  #7 IFBS-tries to keep sugar and carbs limited.  #8 CKD3- retest 10/23.  No NSAIDS reviewed.   #9 subclinical hypothyroid- retest 10/23  #10 hypertension-no headache chest pain or dizziness.  #11 GERD-controlled.  No dysphagia.    Patient Care Team:  Kellee Chowdhury MD as PCP - General (Internal Medicine)  Cesar Garza MD as PCP - [facility] Attributed Provider     Review of Systems  All systems reviewed and negative except as per history of present illness  Objective   Vitals:  /78   Wt 88.3 kg (194 lb 9.6 oz)   BMI 27.14 kg/m²       Physical Exam  Constitutional:       Appearance: Normal appearance.   Cardiovascular:      Rate and Rhythm: Normal rate and regular rhythm.      Pulses: Normal pulses.      Heart sounds: No murmur heard.     No gallop.   Pulmonary:      Effort: Pulmonary effort is normal. No respiratory distress.      Breath sounds: Normal breath sounds. No wheezing, rhonchi or rales.   Neurological:      Mental Status: He is alert.   Psychiatric:         Mood and Affect: Mood normal.         Behavior: Behavior normal.         Thought Content: Thought content normal.         Judgment: Judgment normal.           Lab Results   Component Value Date     WBC 10.9 12/08/2023    HGB 15.6 12/08/2023    HCT 49.8 12/08/2023     12/08/2023    CHOL 137 12/08/2023    TRIG 59 12/08/2023    HDL 68.5 12/08/2023    ALT 36 12/08/2023    AST 20 04/04/2023     06/11/2024    K 4.3 06/11/2024     06/11/2024    CREATININE 1.60 (H) 06/11/2024    BUN 27 (H) 06/11/2024    CO2 27 06/11/2024    TSH 3.11 12/08/2023    PSA 1.93 06/11/2024    INR 1.1 12/11/2019    HGBA1C 5.7 (H) 06/11/2024       Assessment/Plan   Problem List Items Addressed This Visit       CKD (chronic kidney disease), stage III (Multi)    Relevant Orders    Basic Metabolic Panel    Essential hypertension    Hyperlipidemia    Hypothyroidism - Primary    Relevant Orders    TSH with reflex to Free T4 if abnormal    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  I do not have details.  Continue inhalers and as needed prednisone per pulmonology.  F/u  dr Wright.    #2 CAD-clinically stable.  F/u  dr Love/Caro Alejandra.   #3 CVA- remote.  No clear etology.  Neg cards eval for afib.  f/u  neuro/cards  #4 lipids-good, continue treatment   #5 depression/anxiety- f/u psychiatry  #6 insomnia- f/u  w/ psychiatry. Con't melatonin.      #7 IFBS-reviewed.  Stable. Low sugar and carbohydrate diet with exercise reviewed.  Check A1c 6 months  #8 CKD3-stable by 1 year.  Check urinary microalbumin.  No NSAIDs reviewed   #9 subclinical hypothyroid- retest 6 to 12 months   #10 hypertension-good.  Continue treatment  #11 GERD- s/p remote EGD (2-3 yrs ptv???).  Will increase pepcid bid x 2 weeks, resume every day.  GI f/up pending  #12 seasonal allergic rhinitis-significant.  Continue current treatment.  #13 hip DJD- f/u  ortho at [facility]  #14 bph/ED- on Cialis.  f/u   [facility]

## 2024-06-25 ENCOUNTER — HOSPITAL ENCOUNTER (OUTPATIENT)
Dept: VASCULAR MEDICINE | Facility: CLINIC | Age: 74
Discharge: HOME | End: 2024-06-25
Payer: MEDICARE

## 2024-06-25 ENCOUNTER — APPOINTMENT (OUTPATIENT)
Dept: CARDIOLOGY | Facility: CLINIC | Age: 74
End: 2024-06-25
Payer: MEDICARE

## 2024-06-25 ENCOUNTER — HOSPITAL ENCOUNTER (OUTPATIENT)
Dept: CARDIOLOGY | Facility: CLINIC | Age: 74
Discharge: HOME | End: 2024-06-25
Payer: MEDICARE

## 2024-06-25 VITALS
BODY MASS INDEX: 27.06 KG/M2 | DIASTOLIC BLOOD PRESSURE: 71 MMHG | WEIGHT: 194 LBS | HEART RATE: 71 BPM | SYSTOLIC BLOOD PRESSURE: 118 MMHG

## 2024-06-25 DIAGNOSIS — I70.0 ATHEROSCLEROSIS OF AORTA (CMS-HCC): ICD-10-CM

## 2024-06-25 DIAGNOSIS — I63.9 CEREBRAL INFARCTION, UNSPECIFIED MECHANISM (MULTI): ICD-10-CM

## 2024-06-25 DIAGNOSIS — R06.00 DYSPNEA, UNSPECIFIED: ICD-10-CM

## 2024-06-25 DIAGNOSIS — I71.40 ABDOMINAL AORTIC ANEURYSM, WITHOUT RUPTURE, UNSPECIFIED (CMS-HCC): ICD-10-CM

## 2024-06-25 DIAGNOSIS — I25.119 CORONARY ARTERY DISEASE INVOLVING NATIVE CORONARY ARTERY OF NATIVE HEART WITH ANGINA PECTORIS (CMS-HCC): ICD-10-CM

## 2024-06-25 DIAGNOSIS — I25.119 CORONARY ARTERY DISEASE INVOLVING NATIVE CORONARY ARTERY OF NATIVE HEART WITH ANGINA PECTORIS (CMS-HCC): Primary | ICD-10-CM

## 2024-06-25 DIAGNOSIS — R06.09 DOE (DYSPNEA ON EXERTION): ICD-10-CM

## 2024-06-25 DIAGNOSIS — I73.9 PAD (PERIPHERAL ARTERY DISEASE) (CMS-HCC): ICD-10-CM

## 2024-06-25 DIAGNOSIS — I49.3 PVC (PREMATURE VENTRICULAR CONTRACTION): ICD-10-CM

## 2024-06-25 DIAGNOSIS — I10 ESSENTIAL HYPERTENSION: ICD-10-CM

## 2024-06-25 DIAGNOSIS — E78.5 HYPERLIPIDEMIA, UNSPECIFIED HYPERLIPIDEMIA TYPE: ICD-10-CM

## 2024-06-25 DIAGNOSIS — J44.9 CHRONIC OBSTRUCTIVE PULMONARY DISEASE, UNSPECIFIED COPD TYPE (MULTI): ICD-10-CM

## 2024-06-25 DIAGNOSIS — I35.0 NONRHEUMATIC AORTIC VALVE STENOSIS: ICD-10-CM

## 2024-06-25 PROCEDURE — 1160F RVW MEDS BY RX/DR IN RCRD: CPT | Performed by: NURSE PRACTITIONER

## 2024-06-25 PROCEDURE — 93306 TTE W/DOPPLER COMPLETE: CPT

## 2024-06-25 PROCEDURE — 93978 VASCULAR STUDY: CPT

## 2024-06-25 PROCEDURE — 93306 TTE W/DOPPLER COMPLETE: CPT | Performed by: INTERNAL MEDICINE

## 2024-06-25 PROCEDURE — 3074F SYST BP LT 130 MM HG: CPT | Performed by: NURSE PRACTITIONER

## 2024-06-25 PROCEDURE — 99214 OFFICE O/P EST MOD 30 MIN: CPT | Performed by: NURSE PRACTITIONER

## 2024-06-25 PROCEDURE — 1036F TOBACCO NON-USER: CPT | Performed by: NURSE PRACTITIONER

## 2024-06-25 PROCEDURE — 93978 VASCULAR STUDY: CPT | Performed by: INTERNAL MEDICINE

## 2024-06-25 PROCEDURE — 1159F MED LIST DOCD IN RCRD: CPT | Performed by: NURSE PRACTITIONER

## 2024-06-25 PROCEDURE — 3078F DIAST BP <80 MM HG: CPT | Performed by: NURSE PRACTITIONER

## 2024-06-25 NOTE — PROGRESS NOTES
"Chief Complaint:   LE Weakness      History Of Present Illness:    Juan Jose Roman \"Ferny\" is a 74 y.o. male here with LE weakness. Here for ultrasound.     SOB only when climbing the stairs with multiple bags of groceries.     Has issues with legs walking. Cannot get as far as used to. C/O burning at night.   Feels like he cannot gain strength in the legs despite working out.     No sonographic evidence of abdominal aortic aneurysm-4/12/23  Frequent PVC's/RFA 6/10/15 [Severe RCA - EMILY] - 4/24/2015  PVCs [RFA] - 12/20/2019  Cardiac Calcium Score (Total 722:) - 1/2008    MPI (Negative for ischemia. Moderate LV thickness. Mild AVR) - 6/1/2022    Brother aortic dissection.     Allergies:  Ipratropium bromide, Tetracycline, Tetracycline hcl, Valsartan-hydrochlorothiazide, and Plavix [clopidogrel]    Review of Systems  All pertinent systems have been reviewed and are negative except for what is stated in the history of present illness.    All other systems have been reviewed and are negative and noncontributory to this patient's current ailments.     Visit Vitals  /71 (BP Location: Right arm, Patient Position: Sitting, BP Cuff Size: Adult)   Pulse 71   Wt 88 kg (194 lb)   BMI 27.06 kg/m²   Smoking Status Former   BSA 2.1 m²     Objective   Vitals reviewed.   Constitutional:       Appearance: Healthy appearance. Not in distress.   Neck:      Vascular: No JVR. JVD normal.   Pulmonary:      Effort: Pulmonary effort is normal.      Breath sounds: Normal breath sounds. No wheezing. No rhonchi. No rales.   Chest:      Chest wall: Not tender to palpatation.   Cardiovascular:      PMI at left midclavicular line. Normal rate. Regular rhythm. Normal S1. Normal S2.       Murmurs: There is no murmur.      No gallop.  No click. No rub.   Edema:     Peripheral edema absent.   Abdominal:      General: Bowel sounds are normal.      Palpations: Abdomen is soft.      Tenderness: There is no abdominal tenderness.   Musculoskeletal: " Normal range of motion.         General: No tenderness. Skin:     General: Skin is warm and dry.   Neurological:      General: No focal deficit present.      Mental Status: Alert and oriented to person, place and time.   Psychiatric:         Attention and Perception: Attention normal.         Mood and Affect: Mood normal.       Last Labs:  CBC -  Lab Results   Component Value Date    WBC 10.9 12/08/2023    HGB 15.6 12/08/2023    HCT 49.8 12/08/2023    MCV 95 12/08/2023     12/08/2023       CMP -  Lab Results   Component Value Date    CALCIUM 9.2 06/11/2024    PHOS 4.4 03/31/2021    PROT 6.5 04/04/2023    ALBUMIN 4.2 04/04/2023    AST 20 04/04/2023    ALT 36 12/08/2023    ALKPHOS 53 04/04/2023    BILITOT 0.7 04/04/2023    BUN 27 (H) 06/11/2024    CREATININE 1.60 (H) 06/11/2024       LIPID PANEL -   Lab Results   Component Value Date    CHOL 137 12/08/2023    TRIG 59 12/08/2023    HDL 68.5 12/08/2023    CHHDL 2.0 12/08/2023    LDLF 69 04/04/2023    VLDL 12 12/08/2023    NHDL 69 12/08/2023       RENAL FUNCTION PANEL -   Lab Results   Component Value Date    GLUCOSE 107 (H) 06/11/2024     06/11/2024    K 4.3 06/11/2024     06/11/2024    CO2 27 06/11/2024    ANIONGAP 10 06/11/2024    BUN 27 (H) 06/11/2024    CREATININE 1.60 (H) 06/11/2024    GFRMALE 54 (A) 04/04/2023    CALCIUM 9.2 06/11/2024    PHOS 4.4 03/31/2021    ALBUMIN 4.2 04/04/2023        Lab Results   Component Value Date    HGBA1C 5.7 (H) 06/11/2024         Assessment/Plan   Diagnoses and all orders for this visit:  Coronary artery disease involving native coronary artery of native heart with angina pectoris (CMS-HCC)  - EKG NSR 86bpm, no ischemic changes  - denies chest pain and SOB  - statin/asa  PVC (premature ventricular contraction)  - well controlled   - loop recorder  Essential hypertension  - well controlled  Hyperlipidemia, unspecified hyperlipidemia type  - statin  Cerebral infarction, unspecified mechanism (Multi)  - statin/asa  -  loop recorder  PAD (peripheral artery disease) (CMS-HCC)  - known disease in aorta and iliac due to burning at night and LE fatigue with walking we will check vascular US aorta iliac duplex complete  - awaiting vascular scan results   Atherosclerosis of aorta (CMS-[facility])  - noted on previous US  - Vascular US aorta iliac duplex completed today, awaiting results   Chronic obstructive pulmonary disease, unspecified COPD type (Multi)  - per pulmonology   Aortic Stenosis   - mild     Follow up 6 months      Current Outpatient Medications:     albuterol 90 mcg/actuation inhaler, Inhale 2 puffs every 6 hours if needed., Disp: , Rfl:     amLODIPine (Norvasc) 5 mg tablet, TAKE 1 TABLET BY MOUTH EVERY DAY, Disp: 90 tablet, Rfl: 3    Anoro Ellipta 62.5-25 mcg/actuation blister with device, Inhale 1 puff once daily., Disp: , Rfl:     aspirin 81 mg capsule, Take 1 tablet by mouth once daily., Disp: , Rfl:     atorvastatin (Lipitor) 40 mg tablet, TAKE 1 TABLET BY MOUTH EVERY DAY, Disp: 90 tablet, Rfl: 2    azelastine (Astelin) 137 mcg (0.1 %) nasal spray, Administer 2 sprays into each nostril once daily., Disp: , Rfl:     beneprotein, Take by mouth once daily., Disp: , Rfl:     buPROPion (Wellbutrin) 100 mg tablet, Take 1 tablet (100 mg) by mouth once daily in the morning. Take before meals., Disp: , Rfl:     calcium carbonate (Tums) 200 mg calcium chewable tablet, Chew 1 tablet (500 mg) once daily., Disp: , Rfl:     cetirizine (ZyrTEC) 10 mg tablet, Take 1 tablet (10 mg) by mouth once daily., Disp: , Rfl:     cholecalciferol (Vitamin D-3) 125 MCG (5000 UT) capsule, Take 1 capsule (125 mcg) by mouth once daily., Disp: , Rfl:     cholestyramine light (Prevalite) 4 gram packet, Take by mouth., Disp: , Rfl:     clonazePAM (KlonoPIN) 0.5 mg tablet, Take 1 tablet (0.5 mg) by mouth every 12 hours if needed., Disp: , Rfl:     cyclobenzaprine (Flexeril) 5 mg tablet, Take by mouth 3 times a day as needed for muscle spasms., Disp: , Rfl:      famotidine (Pepcid) 40 mg tablet, Take 1 tablet (40 mg) by mouth once every 24 hours., Disp: 90 tablet, Rfl: 3    fluticasone (Flonase) 50 mcg/actuation nasal spray, Administer 1 spray into each nostril once daily., Disp: , Rfl:     hydrOXYzine HCL (Atarax) 25 mg tablet, Take 1 tablet (25 mg) by mouth once daily at bedtime., Disp: , Rfl:     ipratropium (Atrovent) 21 mcg (0.03 %) nasal spray, USE 2 APPLICATIONS IN INTO EACH NOSTRIL TWICE A DAY AS NEEDED, Disp: , Rfl:     levothyroxine (Synthroid, Levoxyl) 88 mcg tablet, Take 1 tablet (88 mcg) by mouth once daily., Disp: 90 tablet, Rfl: 3    losartan (Cozaar) 50 mg tablet, TAKE 1 TABLET BY MOUTH TWICE A DAY, Disp: 180 tablet, Rfl: 2    magnesium gluconate (Magonate) 27.5 mg magne- sium (500 mg) tablet, Take 200 mg by mouth once daily., Disp: , Rfl:     melatonin 5 mg tablet,chewable, Chew., Disp: , Rfl:     metoprolol succinate XL (Kapspargo Sprinkle) 25 mg 24 hr capsule, Take 2 capsules (50 mg) by mouth once daily., Disp: , Rfl:     spironolactone (Aldactone) 25 mg tablet, TAKE 1/2 TABLET BY MOUTH EVERY DAY, Disp: 45 tablet, Rfl: 3    tadalafil (Cialis) 20 mg tablet, Take 1 tablet (20 mg) by mouth once daily as needed for erectile dysfunction., Disp: 12 tablet, Rfl: 3    Exclusive of any other services or procedures performed, I, Caro ACOSTA, spent 30 minutes in duration for this visit today.  This time consisted of chart review, obtaining history, and/or performing the exam as documented above, as well as, documenting the clinical information for the encounter in the electronic record, discussing treatment options, plans, and/or goals with patient, family, and/or caregiver, refilling medications, updating the electronic record, ordering medicines, lab work, imaging, referrals, and/or procedures as documented above and communicating with other [facility] professionals. I have discussed the results of laboratory, radiology, and cardiology studies with the  patient and their family/caregiver.

## 2024-06-26 ENCOUNTER — LAB (OUTPATIENT)
Dept: LAB | Facility: LAB | Age: 74
End: 2024-06-26
Payer: MEDICARE

## 2024-06-26 DIAGNOSIS — E03.9 HYPOTHYROIDISM, UNSPECIFIED TYPE: ICD-10-CM

## 2024-06-26 DIAGNOSIS — N18.31 STAGE 3A CHRONIC KIDNEY DISEASE (MULTI): ICD-10-CM

## 2024-06-26 LAB
ANION GAP SERPL CALC-SCNC: 13 MMOL/L (ref 10–20)
AORTIC VALVE MEAN GRADIENT: 8.8 MMHG
AORTIC VALVE PEAK VELOCITY: 2.23 M/S
AV PEAK GRADIENT: 19.9 MMHG
AVA (PEAK VEL): 1.55 CM2
AVA (VTI): 1.44 CM2
BUN SERPL-MCNC: 31 MG/DL (ref 6–23)
CALCIUM SERPL-MCNC: 8.6 MG/DL (ref 8.6–10.3)
CHLORIDE SERPL-SCNC: 105 MMOL/L (ref 98–107)
CO2 SERPL-SCNC: 25 MMOL/L (ref 21–32)
CREAT SERPL-MCNC: 1.45 MG/DL (ref 0.5–1.3)
EGFRCR SERPLBLD CKD-EPI 2021: 50 ML/MIN/1.73M*2
EJECTION FRACTION APICAL 4 CHAMBER: 70.6
EJECTION FRACTION: 76 %
GLUCOSE SERPL-MCNC: 90 MG/DL (ref 74–99)
LEFT ATRIUM VOLUME AREA LENGTH INDEX BSA: 19.8 ML/M2
LEFT VENTRICLE INTERNAL DIMENSION DIASTOLE: 3.63 CM (ref 3.5–6)
LEFT VENTRICULAR OUTFLOW TRACT DIAMETER: 1.99 CM
MITRAL VALVE E/A RATIO: 0.85
MITRAL VALVE E/E' RATIO: 11.79
POTASSIUM SERPL-SCNC: 4.5 MMOL/L (ref 3.5–5.3)
RIGHT VENTRICLE FREE WALL PEAK S': 14 CM/S
RIGHT VENTRICLE PEAK SYSTOLIC PRESSURE: 30.4 MMHG
SODIUM SERPL-SCNC: 138 MMOL/L (ref 136–145)
TRICUSPID ANNULAR PLANE SYSTOLIC EXCURSION: 2.3 CM
TSH SERPL-ACNC: 2.97 MIU/L (ref 0.44–3.98)

## 2024-06-26 PROCEDURE — 80048 BASIC METABOLIC PNL TOTAL CA: CPT

## 2024-06-26 PROCEDURE — 84443 ASSAY THYROID STIM HORMONE: CPT

## 2024-06-26 PROCEDURE — 36415 COLL VENOUS BLD VENIPUNCTURE: CPT

## 2024-07-01 DIAGNOSIS — I73.9 PAD (PERIPHERAL ARTERY DISEASE) (CMS-HCC): Primary | ICD-10-CM

## 2024-07-01 NOTE — PROGRESS NOTES
Discussed results of US with patient with LE weakness and potential blockage Will refer to vascular.

## 2024-07-08 ENCOUNTER — TELEPHONE (OUTPATIENT)
Dept: CARDIOLOGY | Facility: CLINIC | Age: 74
End: 2024-07-08
Payer: MEDICARE

## 2024-07-08 DIAGNOSIS — I73.9 PAD (PERIPHERAL ARTERY DISEASE) (CMS-HCC): ICD-10-CM

## 2024-07-23 ENCOUNTER — TELEPHONE (OUTPATIENT)
Dept: CARDIOLOGY | Facility: CLINIC | Age: 74
End: 2024-07-23
Payer: MEDICARE

## 2024-07-23 DIAGNOSIS — I73.9 PAD (PERIPHERAL ARTERY DISEASE) (CMS-HCC): Primary | ICD-10-CM

## 2024-07-23 NOTE — TELEPHONE ENCOUNTER
Received message from scheduling regarding appropriateness of NPV with Dr. Cintron. Reviewed chart today with Dr. Cintron and agreed patient is appropriate. He would like to have additional testing done prior to visit. TCT patient and left M requesting return call

## 2024-07-24 ENCOUNTER — APPOINTMENT (OUTPATIENT)
Dept: VASCULAR SURGERY | Facility: CLINIC | Age: 74
End: 2024-07-24
Payer: MEDICARE

## 2024-07-24 NOTE — TELEPHONE ENCOUNTER
Spoke with patient earlier this morning regarding above and that Dr. Cintron wants more imaging prior to appointment. All questions answered. Explained that I would let him know when he could schedule testing.  Also received My Chart message from patient. Responded that order was signed and given number to schedule vascular testing

## 2024-07-30 ENCOUNTER — HOSPITAL ENCOUNTER (OUTPATIENT)
Dept: VASCULAR MEDICINE | Facility: CLINIC | Age: 74
Discharge: HOME | End: 2024-07-30
Payer: MEDICARE

## 2024-07-30 DIAGNOSIS — I73.9 PAD (PERIPHERAL ARTERY DISEASE) (CMS-HCC): ICD-10-CM

## 2024-07-30 PROCEDURE — 93924 LWR XTR VASC STDY BILAT: CPT

## 2024-07-30 PROCEDURE — 93924 LWR XTR VASC STDY BILAT: CPT | Performed by: SURGERY

## 2024-07-31 ENCOUNTER — TELEPHONE (OUTPATIENT)
Dept: CARDIOLOGY | Facility: HOSPITAL | Age: 74
End: 2024-07-31
Payer: MEDICARE

## 2024-08-02 ENCOUNTER — OFFICE VISIT (OUTPATIENT)
Dept: CARDIOLOGY | Facility: CLINIC | Age: 74
End: 2024-08-02
Payer: MEDICARE

## 2024-08-02 VITALS
HEART RATE: 66 BPM | WEIGHT: 190.3 LBS | BODY MASS INDEX: 26.64 KG/M2 | DIASTOLIC BLOOD PRESSURE: 79 MMHG | SYSTOLIC BLOOD PRESSURE: 122 MMHG | HEIGHT: 71 IN | OXYGEN SATURATION: 93 %

## 2024-08-02 DIAGNOSIS — I73.9 PAD (PERIPHERAL ARTERY DISEASE) (CMS-HCC): ICD-10-CM

## 2024-08-02 DIAGNOSIS — M79.661 PAIN IN BOTH LOWER LEGS: ICD-10-CM

## 2024-08-02 DIAGNOSIS — M79.662 PAIN IN BOTH LOWER LEGS: ICD-10-CM

## 2024-08-02 DIAGNOSIS — I71.41 PARARENAL ABDOMINAL AORTIC ANEURYSM (AAA) WITHOUT RUPTURE (CMS-HCC): Primary | ICD-10-CM

## 2024-08-02 PROCEDURE — 99215 OFFICE O/P EST HI 40 MIN: CPT | Performed by: HOSPITALIST

## 2024-08-02 RX ORDER — AZITHROMYCIN 250 MG/1
TABLET, FILM COATED ORAL
COMMUNITY
Start: 2024-07-29

## 2024-08-02 ASSESSMENT — COLUMBIA-SUICIDE SEVERITY RATING SCALE - C-SSRS
6. HAVE YOU EVER DONE ANYTHING, STARTED TO DO ANYTHING, OR PREPARED TO DO ANYTHING TO END YOUR LIFE?: NO
2. HAVE YOU ACTUALLY HAD ANY THOUGHTS OF KILLING YOURSELF?: NO
1. IN THE PAST MONTH, HAVE YOU WISHED YOU WERE DEAD OR WISHED YOU COULD GO TO SLEEP AND NOT WAKE UP?: NO

## 2024-08-02 NOTE — PROGRESS NOTES
"Celia Roman \"Ferny\" is a 74 y.o. male with PMH of HTN, HLD, CVA, CAD, COPD, mild aortic stenosis, and PAD, who was referred by his cardiologist Dr. Love for evaluation of legs pain.  Patient complains of bilateral legs pain, left worse than right extends from the left groin to the left knee and sometimes to the left foot, worse with standing and walking.  Patient quit smoking 3 years ago.  His blood pressure is well-controlled, today is 122/79, heart rate 66. 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  The bilateral common iliac arteries demonstrate no evidence of aneurysm. Turbulence and elevated velocities noted in the mid right common iliac artery suggestive of a disease. Velocities may be underestimated due to calcified shadowing in the distal abdominal aorta and bilateral common iliac arteries. Technically difficult study due to body habitus, bowel gas, and movements.     Review of Systems  ROS is negative other than in HPI.      Objective   Physical Exam  General: NAD  HEENT: IEOM, PERRL   Neck: No JVD or carotid bruit  Lungs: CTAB  Heart: RRR, normal S1 and S2, no loud murmurs  Abdomen: Soft, nontender, positive bowel sounds  Extremities: No edema  Neurologic: No FND  Psychiatric: Normal mood and affect    Assessment/Plan   1-legs pain:  -See HPI for details, not consistent with PAD.  -PVR from 7/30/2024 with right ALVA of 1.28, 1.55, 1.24, 1.21/1.15, postexercise of 0.87, right TBI of 0.52, left ALVA of 1.71, 1.45, 1.30, 0.95/1.13, postexercise 0.87 and left TBI 0.59.  No evidence of aortoiliac disease, mild PAD by exercise measurements.  -Patient was advised to see a back specialist as " this could be a referred pain.    2-PAD:  -Mild and asymptomatic, continue antiplatelet and statin.    3-AAA:  -Small size at 3.4 cm, suprarenal found on ultrasound from 6/26/24.  -Quit smoking 3 years ago, blood pressure well-controlled.  -Will continue monitor with yearly duplex ultrasound.  -Continue aspirin, statin, and metoprolol.  Ensure good heart rate blood pressure control.    RTC in 1 year.    Earl Cintron MD

## 2024-08-02 NOTE — LETTER
"August 2, 2024     Caro Alejandra, APRN-CNP  [address] [place] [address]    Patient: Ferny Roman   YOB: 1950   Date of Visit: 8/2/2024       Dear Dr. Caro Alejandra, APRN-CNP:    Thank you for referring Ferny Roman to me for evaluation. Below are my notes for this consultation.  If you have questions, please do not hesitate to call me. I look forward to following your patient along with you.       Sincerely,     Earl Cintron MD      CC: No Recipients  ______________________________________________________________________________________    Subjective  Juan Jose Roman \"Ferny\" is a 74 y.o. male with PMH of HTN, HLD, CVA, CAD, COPD, mild aortic stenosis, and PAD, who was referred by his cardiologist Dr. Love for evaluation of legs pain.  Patient complains of bilateral legs pain, left worse than right extends from the left groin to the left knee and sometimes to the left foot, worse with standing and walking.  Patient quit smoking 3 years ago.  His blood pressure is well-controlled, today is 122/79, heart rate 66. 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  The bilateral common iliac arteries demonstrate no evidence of aneurysm. Turbulence and elevated velocities noted in the mid right common iliac artery suggestive of a disease. Velocities may be underestimated due to calcified shadowing in the distal abdominal aorta and bilateral common iliac arteries. Technically difficult study due to body habitus, bowel gas, and movements.     Review of Systems  ROS is negative other than in HPI.      Objective  Physical Exam  General: NAD  HEENT: IEOM, PERRL   Neck: No JVD or carotid bruit  Lungs: " CTAB  Heart: RRR, normal S1 and S2, no loud murmurs  Abdomen: Soft, nontender, positive bowel sounds  Extremities: No edema  Neurologic: No FND  Psychiatric: Normal mood and affect    Assessment/Plan  1-legs pain:  -See HPI for details, not consistent with PAD.  -PVR from 7/30/2024 with right ALVA of 1.28, 1.55, 1.24, 1.21/1.15, postexercise of 0.87, right TBI of 0.52, left ALVA of 1.71, 1.45, 1.30, 0.95/1.13, postexercise 0.87 and left TBI 0.59.  No evidence of aortoiliac disease, mild PAD by exercise measurements.  -Patient was advised to see a back specialist as this could be a referred pain.    2-PAD:  -Mild and asymptomatic, continue antiplatelet and statin.    3-AAA:  -Small size at 3.4 cm, suprarenal found on ultrasound from 6/26/24.  -Quit smoking 3 years ago, blood pressure well-controlled.  -Will continue monitor with yearly duplex ultrasound.  -Continue aspirin, statin, and metoprolol.  Ensure good heart rate blood pressure control.    RTC in 1 year.    Earl Cintron MD

## 2024-08-02 NOTE — PATIENT INSTRUCTIONS
Thank you so much for visiting us today.    Your legs pain is not related to a blood flow problem.  You have good blood flow to both legs.  Your legs pain could be related to your back.  Please see a back specialist    For your abdominal [suprarenal i.e. above the renal arteries] aorta aneurysm, a small in size only 3.4 cm.  We are going to monitor yearly with an ultrasound.  Please continue your aspirin, metoprolol, and atorvastatin.  Please ensure your blood pressure is well-controlled around 120/70-80 mmHg.    Will see back in 1 year, please feel free to call us at [phone] if you have any questions.

## 2024-08-03 DIAGNOSIS — I25.10 CORONARY ATHEROSCLEROSIS OF NATIVE CORONARY ARTERY: ICD-10-CM

## 2024-08-05 RX ORDER — METOPROLOL SUCCINATE 25 MG/1
25 TABLET, EXTENDED RELEASE ORAL DAILY
Qty: 90 TABLET | Refills: 3 | Status: SHIPPED | OUTPATIENT
Start: 2024-08-05

## 2024-08-19 ENCOUNTER — DOCUMENTATION (OUTPATIENT)
Dept: PRIMARY CARE | Facility: CLINIC | Age: 74
End: 2024-08-19
Payer: MEDICARE

## 2024-08-19 ENCOUNTER — PATIENT OUTREACH (OUTPATIENT)
Dept: CARE COORDINATION | Facility: CLINIC | Age: 74
End: 2024-08-19
Payer: MEDICARE

## 2024-08-19 ENCOUNTER — HOSPITAL ENCOUNTER (OUTPATIENT)
Dept: CARDIOLOGY | Facility: CLINIC | Age: 74
Discharge: HOME | End: 2024-08-19
Payer: MEDICARE

## 2024-08-19 DIAGNOSIS — I63.9 CEREBRAL INFARCTION, UNSPECIFIED (MULTI): ICD-10-CM

## 2024-08-19 PROCEDURE — 93298 REM INTERROG DEV EVAL SCRMS: CPT

## 2024-08-19 NOTE — PROGRESS NOTES
Discharge Facility: [facility]   Discharge Diagnosis:  ALEXIS (acute kidney injury) (HCC)   Admission Date: 8-17-27   Discharge Date: 8-18-24     PCP Appointment Date: Message routed to office for scheduling     Specialist Appointment Date: 8-20-24 Cardiology   Hospital Encounter and Summary Linked: Yes  See discharge assessment below for further details  Medications  Medications reviewed with patient/caregiver?: Yes (8/19/2024  2:54 PM)  Is the patient having any side effects they believe may be caused by any medication additions or changes?: No (8/19/2024  2:54 PM)  Does the patient have all medications ordered at discharge?: Yes (8/19/2024  2:54 PM)  Care Management Interventions: No intervention needed (8/19/2024  2:54 PM)  Is the patient taking all medications as directed (includes completed medication regime)?: Yes (8/19/2024  2:54 PM)  Care Management Interventions: Provided patient education (8/19/2024  2:54 PM)  Medication Comments: No new meds (8/19/2024  2:54 PM)    Appointments  Does the patient have a primary care provider?: Yes (8/19/2024  2:54 PM)  Care Management Interventions: Advised patient to make appointment (8/19/2024  2:54 PM)  Has the patient kept scheduled appointments due by today?: Yes (8/19/2024  2:54 PM)  Care Management Interventions: Advised to schedule with specialist (8/19/2024  2:54 PM)    Self Management  Has home health visited the patient within 72 hours of discharge?: Not applicable (8/19/2024  2:54 PM)    Patient Teaching  Does the patient have access to their discharge instructions?: Yes (8/19/2024  2:54 PM)  Care Management Interventions: Reviewed instructions with patient (8/19/2024  2:54 PM)  What is the patient's perception of their health status since discharge?: Same (8/19/2024  2:54 PM)  Is the patient/caregiver able to teach back the hierarchy of who to call/visit for symptoms/problems? PCP, Specialist, Home Health nurse, Urgent Care, ED, 911: Yes (8/19/2024  2:54  PM)      Pastora Harris LPN

## 2024-08-20 ENCOUNTER — OFFICE VISIT (OUTPATIENT)
Dept: CARDIOLOGY | Facility: CLINIC | Age: 74
End: 2024-08-20
Payer: MEDICARE

## 2024-08-20 VITALS
DIASTOLIC BLOOD PRESSURE: 79 MMHG | HEART RATE: 97 BPM | TEMPERATURE: 97.7 F | HEIGHT: 71 IN | WEIGHT: 190.5 LBS | SYSTOLIC BLOOD PRESSURE: 127 MMHG | BODY MASS INDEX: 26.67 KG/M2

## 2024-08-20 DIAGNOSIS — I73.9 PAD (PERIPHERAL ARTERY DISEASE) (CMS-HCC): ICD-10-CM

## 2024-08-20 DIAGNOSIS — I10 ESSENTIAL HYPERTENSION: ICD-10-CM

## 2024-08-20 DIAGNOSIS — I70.0 ATHEROSCLEROSIS OF AORTA (CMS-HCC): ICD-10-CM

## 2024-08-20 DIAGNOSIS — I63.9 CEREBRAL INFARCTION, UNSPECIFIED MECHANISM (MULTI): ICD-10-CM

## 2024-08-20 DIAGNOSIS — E78.5 HYPERLIPIDEMIA, UNSPECIFIED HYPERLIPIDEMIA TYPE: ICD-10-CM

## 2024-08-20 DIAGNOSIS — I49.3 PVC (PREMATURE VENTRICULAR CONTRACTION): ICD-10-CM

## 2024-08-20 DIAGNOSIS — R29.898 WEAKNESS OF LEFT LOWER EXTREMITY: ICD-10-CM

## 2024-08-20 DIAGNOSIS — I35.0 NONRHEUMATIC AORTIC VALVE STENOSIS: ICD-10-CM

## 2024-08-20 DIAGNOSIS — J44.9 CHRONIC OBSTRUCTIVE PULMONARY DISEASE, UNSPECIFIED COPD TYPE (MULTI): ICD-10-CM

## 2024-08-20 DIAGNOSIS — I25.119 CORONARY ARTERY DISEASE INVOLVING NATIVE CORONARY ARTERY OF NATIVE HEART WITH ANGINA PECTORIS (CMS-HCC): Primary | ICD-10-CM

## 2024-08-20 DIAGNOSIS — R42 LIGHTHEADED: ICD-10-CM

## 2024-08-20 LAB
ATRIAL RATE: 81 BPM
BODY SURFACE AREA: 2.08 M2
P AXIS: 41 DEGREES
P OFFSET: 167 MS
P ONSET: 104 MS
PR INTERVAL: 240 MS
Q ONSET: 224 MS
QRS COUNT: 13 BEATS
QRS DURATION: 94 MS
QT INTERVAL: 370 MS
QTC CALCULATION(BAZETT): 429 MS
QTC FREDERICIA: 408 MS
R AXIS: 19 DEGREES
T AXIS: 53 DEGREES
T OFFSET: 409 MS
VENTRICULAR RATE: 81 BPM

## 2024-08-20 PROCEDURE — 3074F SYST BP LT 130 MM HG: CPT | Performed by: NURSE PRACTITIONER

## 2024-08-20 PROCEDURE — G2212 PROLONG OUTPT/OFFICE VIS: HCPCS | Performed by: NURSE PRACTITIONER

## 2024-08-20 PROCEDURE — 1160F RVW MEDS BY RX/DR IN RCRD: CPT | Performed by: NURSE PRACTITIONER

## 2024-08-20 PROCEDURE — 93005 ELECTROCARDIOGRAM TRACING: CPT | Performed by: NURSE PRACTITIONER

## 2024-08-20 PROCEDURE — 1036F TOBACCO NON-USER: CPT | Performed by: NURSE PRACTITIONER

## 2024-08-20 PROCEDURE — 99215 OFFICE O/P EST HI 40 MIN: CPT | Performed by: NURSE PRACTITIONER

## 2024-08-20 PROCEDURE — 1159F MED LIST DOCD IN RCRD: CPT | Performed by: NURSE PRACTITIONER

## 2024-08-20 PROCEDURE — 3078F DIAST BP <80 MM HG: CPT | Performed by: NURSE PRACTITIONER

## 2024-08-20 NOTE — PROGRESS NOTES
"Chief Complaint:   Hospital Follow Up     History Of Present Illness:    Juan Jose Roman \"Ferny\" is a 74 y.o. male here for hospital follow up. Presented to hospital with feeling dizzy and near syncope. Had been feeling off balance prior to presentation. Became dizzy and left lower extremity gave out. Was not syncopal. Cr noted to be mildly elevated upon admit. Given IV hydration. Nothing untoward noted CT of head. Troponin -2.     Ferny has fallen 2-3 times in the past 6 months. Most recent episode was Friday. States when his left leg gives out it is usually when he goes from sitting to standing after his leg was crossed. He reports not feeling in it.    Friday he felt lightheaded. Was standing at the bank and was lightheaded. Lasted several minutes and self resolved. Did not just go from sitting to standing.   Later that day he sat for a minute. Someone was at the door. He got up and felt lightheaded again. Lasted several minutes, then resolved.   Denies any other symptoms.   He was no syncope.  Did have something to eat and drink that day.     Today he feels shaky.     No sonographic evidence of abdominal aortic aneurysm-4/12/23  Frequent PVC's/RFA 6/10/15 [Severe RCA - EMILY] - 4/24/2015  PVCs [RFA] - 12/20/2019  Cardiac Calcium Score (Total 722:) - 1/2008    MPI (Negative for ischemia. Moderate LV thickness. Mild AVR) - 6/1/2022    Brother aortic dissection.     Allergies:  Ipratropium bromide, Tetracycline, Tetracycline hcl, Valsartan-hydrochlorothiazide, and Plavix [clopidogrel]    Review of Systems  All pertinent systems have been reviewed and are negative except for what is stated in the history of present illness.    All other systems have been reviewed and are negative and noncontributory to this patient's current ailments.     Visit Vitals  /79   Pulse 97   Temp 36.5 °C (97.7 °F)   Ht 1.803 m (5' 11\")   Wt 86.4 kg (190 lb 8 oz)   BMI 26.57 kg/m²   Smoking Status Former   BSA 2.08 m²       Objective "   Vitals reviewed.   Constitutional:       Appearance: Healthy appearance. Not in distress.   Eyes:      Conjunctiva/sclera: Conjunctivae normal.   Neck:      Vascular: No JVR. JVD normal.   Pulmonary:      Effort: Pulmonary effort is normal.      Breath sounds: Normal breath sounds. No wheezing. No rhonchi. No rales.   Chest:      Chest wall: Not tender to palpatation.   Cardiovascular:      PMI at left midclavicular line. Normal rate. Regular rhythm. Normal S1. Normal S2.       Murmurs: There is no murmur.      No gallop.  No click. No rub.   Edema:     Peripheral edema absent.   Abdominal:      General: Bowel sounds are normal.      Palpations: Abdomen is soft.      Tenderness: There is no abdominal tenderness.   Musculoskeletal: Normal range of motion.         General: No tenderness. Skin:     General: Skin is warm and dry.   Neurological:      General: No focal deficit present.      Mental Status: Alert and oriented to person, place and time.   Psychiatric:         Attention and Perception: Attention normal.         Mood and Affect: Mood normal.       Last Labs:  CBC -  Lab Results   Component Value Date    WBC 10.9 12/08/2023    HGB 15.6 12/08/2023    HCT 49.8 12/08/2023    MCV 95 12/08/2023     12/08/2023       CMP -  Lab Results   Component Value Date    CALCIUM 8.6 06/26/2024    PHOS 4.4 03/31/2021    PROT 6.5 04/04/2023    ALBUMIN 4.2 04/04/2023    AST 20 04/04/2023    ALT 36 12/08/2023    ALKPHOS 53 04/04/2023    BILITOT 0.7 04/04/2023    BUN 31 (H) 06/26/2024    CREATININE 1.45 (H) 06/26/2024       LIPID PANEL -   Lab Results   Component Value Date    CHOL 137 12/08/2023    TRIG 59 12/08/2023    HDL 68.5 12/08/2023    CHHDL 2.0 12/08/2023    LDLF 69 04/04/2023    VLDL 12 12/08/2023    NHDL 69 12/08/2023       RENAL FUNCTION PANEL -   Lab Results   Component Value Date    GLUCOSE 90 06/26/2024     06/26/2024    K 4.5 06/26/2024     06/26/2024    CO2 25 06/26/2024    ANIONGAP 13  06/26/2024    BUN 31 (H) 06/26/2024    CREATININE 1.45 (H) 06/26/2024    GFRMALE 54 (A) 04/04/2023    CALCIUM 8.6 06/26/2024    PHOS 4.4 03/31/2021    ALBUMIN 4.2 04/04/2023        Lab Results   Component Value Date    HGBA1C 5.7 (H) 06/11/2024         Assessment/Plan   Diagnoses and all orders for this visit:  Lightheaded   - I suspect there is some degree of dehydration AEB Cr elevation upon hospital arrival then subsequent improvement with IV hydration  - for right now we will hold jessica and amlodipine. He will monitor bp and hydrate.   - Follow up next week to review symptoms and BP  Coronary artery disease involving native coronary artery of native heart with angina pectoris (CMS-[facility])  - EKG NSR 81pm, no ischemic changes  - denies chest pain and SOB  - statin/asa  PVC (premature ventricular contraction)  - well controlled   - loop recorder, nothing untoward on recent check   Essential hypertension  - well controlled  Hyperlipidemia, unspecified hyperlipidemia type  - statin  Cerebral infarction, unspecified mechanism (Multi)  - statin/asa  - loop recorder  PAD (peripheral artery disease) (CMS-HCC)  - followed with vascular, no intervention warranted   LE weakness  - recommend neuro eval and eeg   Atherosclerosis of aorta (CMS-HCC)  - noted on previous US  - following with vascular   Chronic obstructive pulmonary disease, unspecified COPD type (Multi)  - per pulmonology   Aortic Stenosis   - mild     Follow up 1 week     Outpatient Medications:  Current Outpatient Medications   Medication Instructions    albuterol 90 mcg/actuation inhaler 2 puffs, inhalation, Every 6 hours PRN    amLODIPine (NORVASC) 5 mg, oral, Daily    Anoro Ellipta 62.5-25 mcg/actuation blister with device 1 puff, inhalation, Daily    aspirin 81 mg capsule 1 tablet, oral, Daily    atorvastatin (LIPITOR) 40 mg, oral, Daily    azelastine (Astelin) 137 mcg (0.1 %) nasal spray 2 sprays, Each Nostril, Daily    azithromycin (Zithromax) 250 mg  tablet TAKE 2 TABLETS BY MOUTH TODAY, THEN TAKE 1 TABLET DAILY FOR 4 DAYS AS DIRECTED    beneprotein oral, Daily RT    buPROPion (WELLBUTRIN) 100 mg, oral, Daily before breakfast    calcium carbonate (Tums) 200 mg calcium chewable tablet 1 tablet, oral, Daily    cetirizine (ZYRTEC) 10 mg, oral, Daily PRN    cholecalciferol (Vitamin D-3) 125 MCG (5000 UT) capsule 1 capsule, oral, Daily    cholestyramine light (Prevalite) 4 gram packet oral    clonazePAM (KLONOPIN) 0.5 mg, oral, Every 12 hours PRN    cyclobenzaprine (Flexeril) 5 mg tablet oral, 3 times daily PRN    famotidine (PEPCID) 40 mg, oral, Every 24 hours    fluticasone (Flonase) 50 mcg/actuation nasal spray 1 spray, Each Nostril, Daily    hydrOXYzine HCL (ATARAX) 25 mg, oral, Nightly    ipratropium (Atrovent) 21 mcg (0.03 %) nasal spray USE 2 APPLICATIONS IN INTO EACH NOSTRIL TWICE A DAY AS NEEDED    levothyroxine (SYNTHROID, LEVOXYL) 88 mcg, oral, Daily    losartan (COZAAR) 50 mg, oral, 2 times daily    magnesium gluconate (Magonate) 27.5 mg magne- sium (500 mg) tablet 200 mg, oral, Daily    melatonin 5 mg tablet,chewable oral    metoprolol succinate XL (KAPSPARGO SPRINKLE) 25 mg, oral, Daily    metoprolol succinate XL (TOPROL-XL) 25 mg, oral, Daily    tadalafil (CIALIS) 20 mg, oral, Daily PRN     Exclusive of any other services or procedures performed, I, Caro ACOSTA, spent 74 minutes in duration for this visit today.  This time consisted of chart review, obtaining history, and/or performing the exam as documented above, as well as, documenting the clinical information for the encounter in the electronic record, discussing treatment options, plans, and/or goals with patient, family, and/or caregiver, refilling medications, updating the electronic record, ordering medicines, lab work, imaging, referrals, and/or procedures as documented above and communicating with other [facility] professionals. I have discussed the results of laboratory, radiology,  and cardiology studies with the patient and their family/caregiver.

## 2024-08-23 ENCOUNTER — APPOINTMENT (OUTPATIENT)
Dept: VASCULAR SURGERY | Facility: HOSPITAL | Age: 74
End: 2024-08-23
Payer: MEDICARE

## 2024-08-23 ENCOUNTER — TELEPHONE (OUTPATIENT)
Dept: PRIMARY CARE | Facility: CLINIC | Age: 74
End: 2024-08-23
Payer: MEDICARE

## 2024-08-26 ENCOUNTER — APPOINTMENT (OUTPATIENT)
Dept: VASCULAR SURGERY | Facility: HOSPITAL | Age: 74
End: 2024-08-26
Payer: MEDICARE

## 2024-08-27 ENCOUNTER — OFFICE VISIT (OUTPATIENT)
Dept: CARDIOLOGY | Facility: CLINIC | Age: 74
End: 2024-08-27
Payer: MEDICARE

## 2024-08-27 ENCOUNTER — APPOINTMENT (OUTPATIENT)
Dept: PRIMARY CARE | Facility: CLINIC | Age: 74
End: 2024-08-27
Payer: MEDICARE

## 2024-08-27 VITALS
SYSTOLIC BLOOD PRESSURE: 108 MMHG | BODY MASS INDEX: 26.78 KG/M2 | DIASTOLIC BLOOD PRESSURE: 57 MMHG | WEIGHT: 192 LBS | HEART RATE: 71 BPM

## 2024-08-27 DIAGNOSIS — Z13.9 SCREENING DUE: ICD-10-CM

## 2024-08-27 DIAGNOSIS — I63.9 CEREBRAL INFARCTION, UNSPECIFIED MECHANISM (MULTI): ICD-10-CM

## 2024-08-27 DIAGNOSIS — J44.9 CHRONIC OBSTRUCTIVE PULMONARY DISEASE, UNSPECIFIED COPD TYPE (MULTI): ICD-10-CM

## 2024-08-27 DIAGNOSIS — I73.9 PAD (PERIPHERAL ARTERY DISEASE) (CMS-HCC): ICD-10-CM

## 2024-08-27 DIAGNOSIS — I35.0 NONRHEUMATIC AORTIC VALVE STENOSIS: ICD-10-CM

## 2024-08-27 DIAGNOSIS — E78.5 HYPERLIPIDEMIA, UNSPECIFIED HYPERLIPIDEMIA TYPE: ICD-10-CM

## 2024-08-27 DIAGNOSIS — M21.372 LEFT FOOT DROP: ICD-10-CM

## 2024-08-27 DIAGNOSIS — R26.89 BALANCE DISORDER: ICD-10-CM

## 2024-08-27 DIAGNOSIS — I49.3 PVC (PREMATURE VENTRICULAR CONTRACTION): ICD-10-CM

## 2024-08-27 DIAGNOSIS — I10 ESSENTIAL HYPERTENSION: ICD-10-CM

## 2024-08-27 DIAGNOSIS — I25.119 CORONARY ARTERY DISEASE INVOLVING NATIVE CORONARY ARTERY OF NATIVE HEART WITH ANGINA PECTORIS (CMS-HCC): ICD-10-CM

## 2024-08-27 DIAGNOSIS — I70.0 ATHEROSCLEROSIS OF AORTA (CMS-HCC): ICD-10-CM

## 2024-08-27 DIAGNOSIS — R42 LIGHTHEADED: Primary | ICD-10-CM

## 2024-08-27 DIAGNOSIS — R29.898 WEAKNESS OF LEFT LOWER EXTREMITY: ICD-10-CM

## 2024-08-27 DIAGNOSIS — I10 ESSENTIAL (PRIMARY) HYPERTENSION: ICD-10-CM

## 2024-08-27 LAB
ANION GAP SERPL CALC-SCNC: 11 MMOL/L (ref 10–20)
BUN SERPL-MCNC: 26 MG/DL (ref 6–23)
CALCIUM SERPL-MCNC: 9.4 MG/DL (ref 8.6–10.3)
CHLORIDE SERPL-SCNC: 106 MMOL/L (ref 98–107)
CO2 SERPL-SCNC: 27 MMOL/L (ref 21–32)
CREAT SERPL-MCNC: 1.58 MG/DL (ref 0.5–1.3)
EGFRCR SERPLBLD CKD-EPI 2021: 45 ML/MIN/1.73M*2
GLUCOSE SERPL-MCNC: 104 MG/DL (ref 74–99)
POTASSIUM SERPL-SCNC: 4.8 MMOL/L (ref 3.5–5.3)
SODIUM SERPL-SCNC: 139 MMOL/L (ref 136–145)
TSH SERPL-ACNC: 2.83 MIU/L (ref 0.44–3.98)

## 2024-08-27 PROCEDURE — 3078F DIAST BP <80 MM HG: CPT | Performed by: NURSE PRACTITIONER

## 2024-08-27 PROCEDURE — 3074F SYST BP LT 130 MM HG: CPT | Performed by: NURSE PRACTITIONER

## 2024-08-27 PROCEDURE — 99215 OFFICE O/P EST HI 40 MIN: CPT | Performed by: NURSE PRACTITIONER

## 2024-08-27 PROCEDURE — 1159F MED LIST DOCD IN RCRD: CPT | Performed by: NURSE PRACTITIONER

## 2024-08-27 PROCEDURE — 80048 BASIC METABOLIC PNL TOTAL CA: CPT | Performed by: NURSE PRACTITIONER

## 2024-08-27 PROCEDURE — 36415 COLL VENOUS BLD VENIPUNCTURE: CPT | Performed by: NURSE PRACTITIONER

## 2024-08-27 PROCEDURE — 84443 ASSAY THYROID STIM HORMONE: CPT | Performed by: NURSE PRACTITIONER

## 2024-08-27 PROCEDURE — 1036F TOBACCO NON-USER: CPT | Performed by: NURSE PRACTITIONER

## 2024-08-27 PROCEDURE — 1160F RVW MEDS BY RX/DR IN RCRD: CPT | Performed by: NURSE PRACTITIONER

## 2024-08-27 PROCEDURE — 84153 ASSAY OF PSA TOTAL: CPT | Performed by: NURSE PRACTITIONER

## 2024-08-27 RX ORDER — AMLODIPINE BESYLATE 2.5 MG/1
2.5 TABLET ORAL DAILY
Start: 2024-08-27 | End: 2024-09-26

## 2024-08-27 NOTE — PROGRESS NOTES
"Chief Complaint:   Hospital Follow Up     History Of Present Illness:    Juan Jose Roman \"Ferny\" is a 74 y.o. male here for hospital follow up. Presented to hospital with feeling dizzy and near syncope. Had been feeling off balance prior to presentation. Became dizzy and left lower extremity gave out. Was not syncopal. Cr noted to be mildly elevated upon admit. Given IV hydration. Nothing untoward noted CT of head. Troponin -2.     Ferny has fallen 2-3 times in the past 6 months.  States when his left leg gives out it is usually when he goes from sitting to standing after his leg was crossed. He reports not feeling in it.    When he fell 2 weeks ago he felt lightheaded. Was standing at the bank and was lightheaded. Lasted several minutes and self resolved. Did not just go from sitting to standing.   Later that day he sat for a minute. Someone was at the door. He got up and felt lightheaded again. Lasted several minutes, then resolved.   Denies any other symptoms.   He was no syncope.  Did have something to eat and drink that day.     We held his jessica and amlodipine last week. Today he felt dizzy when he leaned over, same thing yesterday. Home bp systolics 130-150s. Lightheadedness somewhat improved.    No sonographic evidence of abdominal aortic aneurysm-4/12/23  Frequent PVC's/RFA 6/10/15 [Severe RCA - EMILY] - 4/24/2015  PVCs [RFA] - 12/20/2019  Cardiac Calcium Score (Total 722:) - 1/2008    MPI (Negative for ischemia. Moderate LV thickness. Mild AVR) - 6/1/2022    Brother aortic dissection.     Allergies:  Ipratropium bromide, Tetracycline, Tetracycline hcl, Valsartan-hydrochlorothiazide, and Plavix [clopidogrel]    Review of Systems  All pertinent systems have been reviewed and are negative except for what is stated in the history of present illness.    All other systems have been reviewed and are negative and noncontributory to this patient's current ailments.     Visit Vitals  /57 (BP Location: Right arm, " Patient Position: Sitting, BP Cuff Size: Adult)   Pulse 71   Wt 87.1 kg (192 lb)   BMI 26.78 kg/m²   Smoking Status Former   BSA 2.09 m²     Objective   Vitals reviewed.   Constitutional:       Appearance: Healthy appearance. Not in distress.   Eyes:      Conjunctiva/sclera: Conjunctivae normal.   Neck:      Vascular: No JVR. JVD normal.   Pulmonary:      Effort: Pulmonary effort is normal.      Breath sounds: Normal breath sounds. No wheezing. No rhonchi. No rales.   Chest:      Chest wall: Not tender to palpatation.   Cardiovascular:      PMI at left midclavicular line. Normal rate. Regular rhythm. Normal S1. Normal S2.       Murmurs: There is no murmur.      No gallop.  No click. No rub.   Edema:     Peripheral edema absent.   Abdominal:      General: Bowel sounds are normal.      Palpations: Abdomen is soft.      Tenderness: There is no abdominal tenderness.   Musculoskeletal: Normal range of motion.         General: No tenderness. Skin:     General: Skin is warm and dry.   Neurological:      General: No focal deficit present.      Mental Status: Alert and oriented to person, place and time.   Psychiatric:         Attention and Perception: Attention normal.         Mood and Affect: Mood normal.       Last Labs:  CBC -  Lab Results   Component Value Date    WBC 10.9 12/08/2023    HGB 15.6 12/08/2023    HCT 49.8 12/08/2023    MCV 95 12/08/2023     12/08/2023       CMP -  Lab Results   Component Value Date    CALCIUM 8.6 06/26/2024    PHOS 4.4 03/31/2021    PROT 6.5 04/04/2023    ALBUMIN 4.2 04/04/2023    AST 20 04/04/2023    ALT 36 12/08/2023    ALKPHOS 53 04/04/2023    BILITOT 0.7 04/04/2023    BUN 31 (H) 06/26/2024    CREATININE 1.45 (H) 06/26/2024       LIPID PANEL -   Lab Results   Component Value Date    CHOL 137 12/08/2023    TRIG 59 12/08/2023    HDL 68.5 12/08/2023    CHHDL 2.0 12/08/2023    LDLF 69 04/04/2023    VLDL 12 12/08/2023    NHDL 69 12/08/2023       RENAL FUNCTION PANEL -   Lab Results    Component Value Date    GLUCOSE 90 06/26/2024     06/26/2024    K 4.5 06/26/2024     06/26/2024    CO2 25 06/26/2024    ANIONGAP 13 06/26/2024    BUN 31 (H) 06/26/2024    CREATININE 1.45 (H) 06/26/2024    GFRMALE 54 (A) 04/04/2023    CALCIUM 8.6 06/26/2024    PHOS 4.4 03/31/2021    ALBUMIN 4.2 04/04/2023        Lab Results   Component Value Date    HGBA1C 5.7 (H) 06/11/2024         Assessment/Plan   Diagnoses and all orders for this visit:  Lightheaded   - I suspect there is some degree of dehydration AEB Cr elevation upon hospital arrival then subsequent improvement with IV hydration  - Symptoms somewhat improved with holding jessica. BP borderline elevated, will add back low dose amlodipine   - continue hydration  - bmp today  Coronary artery disease involving native coronary artery of native heart with angina pectoris (CMS-HCC)  - EKG NSR 81pm, no ischemic changes  - denies chest pain and SOB  - statin/asa  PVC (premature ventricular contraction)  - well controlled   - loop recorder, nothing untoward on recent check   Essential hypertension  - well controlled  Hyperlipidemia, unspecified hyperlipidemia type  - statin  Cerebral infarction, unspecified mechanism (Multi)  - statin/asa  - loop recorder  PAD (peripheral artery disease) (CMS-HCC)  - followed with vascular, no intervention warranted   LE weakness/ balance issues/drop foot  - referred to PT  Atherosclerosis of aorta (CMS-HCC)  - noted on previous US  - following with vascular   Chronic obstructive pulmonary disease, unspecified COPD type (Multi)  - per pulmonology   Aortic Stenosis   - mild     Follow up 3-4 weeks      Current Outpatient Medications:     albuterol 90 mcg/actuation inhaler, Inhale 2 puffs every 6 hours if needed., Disp: , Rfl:     Anoro Ellipta 62.5-25 mcg/actuation blister with device, Inhale 1 puff once daily., Disp: , Rfl:     aspirin 81 mg capsule, Take 1 tablet by mouth once daily., Disp: , Rfl:     atorvastatin (Lipitor)  40 mg tablet, TAKE 1 TABLET BY MOUTH EVERY DAY, Disp: 90 tablet, Rfl: 2    azelastine (Astelin) 137 mcg (0.1 %) nasal spray, Administer 2 sprays into each nostril once daily., Disp: , Rfl:     azithromycin (Zithromax) 250 mg tablet, TAKE 2 TABLETS BY MOUTH TODAY, THEN TAKE 1 TABLET DAILY FOR 4 DAYS AS DIRECTED, Disp: , Rfl:     beneprotein, Take by mouth once daily., Disp: , Rfl:     buPROPion (Wellbutrin) 100 mg tablet, Take 1 tablet (100 mg) by mouth once daily in the morning. Take before meals., Disp: , Rfl:     calcium carbonate (Tums) 200 mg calcium chewable tablet, Chew 1 tablet (500 mg) once daily., Disp: , Rfl:     cetirizine (ZyrTEC) 10 mg tablet, Take 1 tablet (10 mg) by mouth once daily as needed., Disp: , Rfl:     cholecalciferol (Vitamin D-3) 125 MCG (5000 UT) capsule, Take 1 capsule (125 mcg) by mouth once daily., Disp: , Rfl:     cholestyramine light (Prevalite) 4 gram packet, Take by mouth., Disp: , Rfl:     clonazePAM (KlonoPIN) 0.5 mg tablet, Take 1 tablet (0.5 mg) by mouth every 12 hours if needed., Disp: , Rfl:     cyclobenzaprine (Flexeril) 5 mg tablet, Take by mouth 3 times a day as needed for muscle spasms., Disp: , Rfl:     famotidine (Pepcid) 40 mg tablet, Take 1 tablet (40 mg) by mouth once every 24 hours., Disp: 90 tablet, Rfl: 3    fluticasone (Flonase) 50 mcg/actuation nasal spray, Administer 1 spray into each nostril once daily., Disp: , Rfl:     hydrOXYzine HCL (Atarax) 25 mg tablet, Take 1 tablet (25 mg) by mouth once daily at bedtime., Disp: , Rfl:     ipratropium (Atrovent) 21 mcg (0.03 %) nasal spray, USE 2 APPLICATIONS IN INTO EACH NOSTRIL TWICE A DAY AS NEEDED, Disp: , Rfl:     levothyroxine (Synthroid, Levoxyl) 88 mcg tablet, Take 1 tablet (88 mcg) by mouth once daily., Disp: 90 tablet, Rfl: 3    losartan (Cozaar) 50 mg tablet, TAKE 1 TABLET BY MOUTH TWICE A DAY, Disp: 180 tablet, Rfl: 2    magnesium gluconate (Magonate) 27.5 mg magne- sium (500 mg) tablet, Take 200 mg by mouth  once daily., Disp: , Rfl:     melatonin 5 mg tablet,chewable, Chew., Disp: , Rfl:     metoprolol succinate XL (Toprol-XL) 25 mg 24 hr tablet, TAKE 1 TABLET BY MOUTH EVERY DAY, Disp: 90 tablet, Rfl: 3    tadalafil (Cialis) 20 mg tablet, Take 1 tablet (20 mg) by mouth once daily as needed for erectile dysfunction., Disp: 12 tablet, Rfl: 3    amLODIPine (Norvasc) 2.5 mg tablet, Take 1 tablet (2.5 mg) by mouth once daily., Disp: , Rfl:     metoprolol succinate XL (Kapspargo Sprinkle) 25 mg 24 hr capsule, Take 1 capsule (25 mg) by mouth once daily., Disp: , Rfl:     Exclusive of any other services or procedures performed, I, Caro ACOSTA, spent 44 minutes in duration for this visit today.  This time consisted of chart review, obtaining history, and/or performing the exam as documented above, as well as, documenting the clinical information for the encounter in the electronic record, discussing treatment options, plans, and/or goals with patient, family, and/or caregiver, refilling medications, updating the electronic record, ordering medicines, lab work, imaging, referrals, and/or procedures as documented above and communicating with other [facility] professionals. I have discussed the results of laboratory, radiology, and cardiology studies with the patient and their family/caregiver.

## 2024-08-28 LAB — PSA SERPL-MCNC: 2.37 NG/ML

## 2024-08-30 ENCOUNTER — APPOINTMENT (OUTPATIENT)
Dept: PRIMARY CARE | Facility: CLINIC | Age: 74
End: 2024-08-30
Payer: MEDICARE

## 2024-08-30 VITALS
SYSTOLIC BLOOD PRESSURE: 136 MMHG | BODY MASS INDEX: 26.97 KG/M2 | WEIGHT: 193.4 LBS | TEMPERATURE: 98.4 F | DIASTOLIC BLOOD PRESSURE: 82 MMHG

## 2024-08-30 DIAGNOSIS — R42 DIZZINESS: Primary | ICD-10-CM

## 2024-08-30 PROCEDURE — 3075F SYST BP GE 130 - 139MM HG: CPT | Performed by: INTERNAL MEDICINE

## 2024-08-30 PROCEDURE — 1124F ACP DISCUSS-NO DSCNMKR DOCD: CPT | Performed by: INTERNAL MEDICINE

## 2024-08-30 PROCEDURE — 1159F MED LIST DOCD IN RCRD: CPT | Performed by: INTERNAL MEDICINE

## 2024-08-30 PROCEDURE — 1160F RVW MEDS BY RX/DR IN RCRD: CPT | Performed by: INTERNAL MEDICINE

## 2024-08-30 PROCEDURE — 3079F DIAST BP 80-89 MM HG: CPT | Performed by: INTERNAL MEDICINE

## 2024-08-30 PROCEDURE — 99495 TRANSJ CARE MGMT MOD F2F 14D: CPT | Performed by: INTERNAL MEDICINE

## 2024-08-30 NOTE — PROGRESS NOTES
"Subjective   Patient ID: Ferny Roman is a 74 y.o. male who presents for hospital follow up [facility], dizziness, fall.    [facility]     Discharge note 8/18/24 reviewed;    \"REASON I WAS IN THE HOSPITAL: Lightheadedness, near-syncope     SUMMARY OF WHAT HAPPENED WHILE I WAS IN THE HOSPITAL: You presented to the emergency department with multiple episodes of feeling lightheaded and possibly close to losing consciousness.  This occurred in the setting of taking multiple medications chronically for high blood pressure and a history of stroke and coronary disease.  In the emergency department, kidney function was below baseline, and IV fluids were given.  There was no evidence of a new stroke, and CT scan only showed changes of the old stroke.  With IV fluids, your kidney function returned to baseline, and you were considered stable for discharge home.  Amlodipine is recommended to be discontinued at this time, and you should monitor your blood pressure at home and follow-up with your primary care physician to see if restarting amlodipine will be necessary in the future.  Due to chronic issues with peripheral neuropathy, outpatient EMG testing is recommended.  You should also have repeat blood work in several days to verify that your kidney function has remained stable after discharge.  This may be performed at any [facility] laboratory\"     Overall well   Note 12/13/23 reviewed  #1 COPD-follows with pulmonology.  Clinically doing well.    #2 CAD- f/u  dr Love/Caro Alejandra-note from yesterday reviewed.  #3 CVA- remote.  No clear etology.  Neg cards eval for afib.  f/u  neuro/cards  #4 lipids-on treatment without difficulty.  #5 depression/anxiety-overall mood has been good  #6 insomnia-relatively stable with melatonin.  #7 IFBS-tries to keep sugar and carbs limited.  #8 CKD3- retest 10/23.  No NSAIDS reviewed.   #9 subclinical hypothyroid- retest 10/23  #10 hypertension-no headache chest pain or dizziness.  #11 " GERD-controlled.  No dysphagia.  Review of Systems    Objective   /82   Temp 36.9 °C (98.4 °F)   Wt 87.7 kg (193 lb 6.4 oz)   BMI 26.97 kg/m²     Physical Exam    Assessment/Plan       #1 COPD-clinically stable.  I do not have details.  Continue inhalers and as needed prednisone per pulmonology.  F/u  dr Wright.    #2 CAD-clinically stable.  F/u  dr Love/Caro Alejandar.   #3 CVA- remote.  No clear etology.  Neg cards eval for afib.  f/u  neuro/cards  #4 lipids-good, continue treatment   #5 depression/anxiety- f/u psychiatry  #6 insomnia- f/u  w/ psychiatry. Con't melatonin.      #7 IFBS-reviewed.  Stable. Low sugar and carbohydrate diet with exercise reviewed.  Check A1c 6 months  #8 CKD3-stable by 1 year.  Check urinary microalbumin.  No NSAIDs reviewed   #9 subclinical hypothyroid- retest 6 to 12 months   #10 hypertension-good.  Continue treatment  #11 GERD- s/p remote EGD (2-3 yrs ptv???).  Will increase pepcid bid x 2 weeks, resume every day.  GI f/up pending  #12 seasonal allergic rhinitis-significant.  Continue current treatment.  #13 hip DJD- f/u  ortho at [facility]  #14 bph/ED- on Cialis.  f/u   [facility]

## 2024-09-03 ENCOUNTER — APPOINTMENT (OUTPATIENT)
Dept: AUDIOLOGY | Facility: CLINIC | Age: 74
End: 2024-09-03
Payer: MEDICARE

## 2024-09-03 ENCOUNTER — PATIENT OUTREACH (OUTPATIENT)
Dept: PRIMARY CARE | Facility: CLINIC | Age: 74
End: 2024-09-03
Payer: MEDICARE

## 2024-09-03 LAB
ATRIAL RATE: 81 BPM
P AXIS: 41 DEGREES
P OFFSET: 167 MS
P ONSET: 104 MS
PR INTERVAL: 240 MS
Q ONSET: 224 MS
QRS COUNT: 13 BEATS
QRS DURATION: 94 MS
QT INTERVAL: 370 MS
QTC CALCULATION(BAZETT): 429 MS
QTC FREDERICIA: 408 MS
R AXIS: 19 DEGREES
T AXIS: 53 DEGREES
T OFFSET: 409 MS
VENTRICULAR RATE: 81 BPM

## 2024-09-03 NOTE — PROGRESS NOTES
Call regarding appt. with PCP on ( 8-30-24 ) after hospitalization.  At time of outreach call the patient feels as if their condition has improved since last visit.  Reviewed the PCP appointment with the pt and addressed any questions or concerns.    Pt. States he called the Neurology office at :   T: [phone]  F :[phone]   And you he needs a referral faxed to this office to be seen,     Pt. Also states that he wants to be sure this is the office Dr. Chowdhury would like for him to be seen at.     PT- 9-4-24     Pastora Harris LPN

## 2024-09-04 ENCOUNTER — EVALUATION (OUTPATIENT)
Dept: PHYSICAL THERAPY | Facility: CLINIC | Age: 74
End: 2024-09-04
Payer: MEDICARE

## 2024-09-05 ENCOUNTER — TELEPHONE (OUTPATIENT)
Dept: CARDIOLOGY | Facility: CLINIC | Age: 74
End: 2024-09-05
Payer: MEDICARE

## 2024-09-10 ENCOUNTER — APPOINTMENT (OUTPATIENT)
Dept: PRIMARY CARE | Facility: CLINIC | Age: 74
End: 2024-09-10
Payer: MEDICARE

## 2024-09-10 VITALS
HEART RATE: 61 BPM | OXYGEN SATURATION: 96 % | WEIGHT: 188 LBS | SYSTOLIC BLOOD PRESSURE: 126 MMHG | DIASTOLIC BLOOD PRESSURE: 71 MMHG | TEMPERATURE: 97.3 F | BODY MASS INDEX: 26.22 KG/M2

## 2024-09-10 DIAGNOSIS — I25.10 CORONARY ATHEROSCLEROSIS OF NATIVE CORONARY ARTERY: ICD-10-CM

## 2024-09-10 DIAGNOSIS — H61.23 BILATERAL IMPACTED CERUMEN: Primary | ICD-10-CM

## 2024-09-10 DIAGNOSIS — R42 LIGHTHEADEDNESS: ICD-10-CM

## 2024-09-10 DIAGNOSIS — N52.9 ERECTILE DISORDER: ICD-10-CM

## 2024-09-10 PROCEDURE — 3074F SYST BP LT 130 MM HG: CPT | Performed by: STUDENT IN AN ORGANIZED HEALTH CARE EDUCATION/TRAINING PROGRAM

## 2024-09-10 PROCEDURE — 1123F ACP DISCUSS/DSCN MKR DOCD: CPT | Performed by: STUDENT IN AN ORGANIZED HEALTH CARE EDUCATION/TRAINING PROGRAM

## 2024-09-10 PROCEDURE — 1159F MED LIST DOCD IN RCRD: CPT | Performed by: STUDENT IN AN ORGANIZED HEALTH CARE EDUCATION/TRAINING PROGRAM

## 2024-09-10 PROCEDURE — 99215 OFFICE O/P EST HI 40 MIN: CPT | Performed by: STUDENT IN AN ORGANIZED HEALTH CARE EDUCATION/TRAINING PROGRAM

## 2024-09-10 PROCEDURE — 1160F RVW MEDS BY RX/DR IN RCRD: CPT | Performed by: STUDENT IN AN ORGANIZED HEALTH CARE EDUCATION/TRAINING PROGRAM

## 2024-09-10 PROCEDURE — 1036F TOBACCO NON-USER: CPT | Performed by: STUDENT IN AN ORGANIZED HEALTH CARE EDUCATION/TRAINING PROGRAM

## 2024-09-10 PROCEDURE — 3078F DIAST BP <80 MM HG: CPT | Performed by: STUDENT IN AN ORGANIZED HEALTH CARE EDUCATION/TRAINING PROGRAM

## 2024-09-10 RX ORDER — TADALAFIL 5 MG/1
5 TABLET ORAL DAILY PRN
Qty: 30 TABLET | Refills: 3 | Status: SHIPPED | OUTPATIENT
Start: 2024-09-10 | End: 2024-09-10 | Stop reason: SDUPTHER

## 2024-09-10 RX ORDER — TADALAFIL 5 MG/1
5 TABLET ORAL DAILY
Qty: 30 TABLET | Refills: 3 | Status: SHIPPED | OUTPATIENT
Start: 2024-09-10

## 2024-09-10 ASSESSMENT — ENCOUNTER SYMPTOMS
FATIGUE: 0
SHORTNESS OF BREATH: 0
ABDOMINAL PAIN: 0
CHILLS: 0
FEVER: 0
PALPITATIONS: 0
LIGHT-HEADEDNESS: 1
DIZZINESS: 0

## 2024-09-10 NOTE — PROGRESS NOTES
"  Assessment/Plan   Problem List Items Addressed This Visit    None  Visit Diagnoses         Codes    Bilateral impacted cerumen    -  Primary H61.23    Relevant Medications    carbamide peroxide (Debrox) 6.5 % otic solution    Coronary atherosclerosis of native coronary artery     I25.10    Relevant Medications    tadalafil (Cialis) 5 mg tablet    Erectile disorder     N52.9    Relevant Medications    tadalafil (Cialis) 5 mg tablet        Will trial off of hydroxyzine nightly and lower dose of cialis. If no improvement will trial off of metoprolol.   Referral information     Subjective   Patient ID: Juan Jose Roman \"Wanda" is a 74 y.o. male who presents for Hospital Follow-up.  HPI  Patient is here for ED follow up 9/4 for lightheadedness after pilocarpine administration for laser eye surgery (patient states after administration of this medication he could barely walk). He then went to ED.  ED physician reviewed prior workup with patient, discussed potentially adjusting metoprolol to lower dose to see if this helps improve symptoms. Patient wished to discuss further with PCP rather than adjusting medication with ED physician.     Of note patient was admitted 8/17 for lightheadedness, found to have ALEXIS and was dehydrated.  This improved with rehydration.  Blood pressure medications were adjusted and he was discharged in stable condition.  He then followed up with his cardiologist and was placed on lower dose of his amlodipine (was at 5 mg and was lowered to 2.5 mg).  He was also followed up by his primary care doctor who referred to neurology with suspected diagnosis of neurocardiogenic cause of lightheadedness and was prescribed compression stockings. Since these interventions he has not had improvement of lightheadedness.     Patient states that trigger for lightheadedness is standing still. He did have episode that I witnessed today in office after he stepped off of scale to obtain weight. Patient began to stumble " and caught himself with his cane. Episode lasted under five seconds. Symptoms always improve when sitting.     Vitals today HR 61, SpO2 96%, /71 mmHg    Reviewed medication list with patient. He states that he recently started taking hydroxzyine every night instead of as needed in the past month or so.   Review of Systems   Constitutional:  Negative for chills, fatigue and fever.   Respiratory:  Negative for shortness of breath.    Cardiovascular:  Negative for chest pain, palpitations and leg swelling.   Gastrointestinal:  Negative for abdominal pain.   Neurological:  Positive for light-headedness. Negative for dizziness.       Objective   Physical Exam  Constitutional:       Appearance: Normal appearance.   HENT:      Right Ear: There is impacted cerumen.      Left Ear: There is impacted cerumen.   Cardiovascular:      Rate and Rhythm: Normal rate and regular rhythm.   Pulmonary:      Effort: Pulmonary effort is normal.      Breath sounds: Normal breath sounds.   Neurological:      Mental Status: He is alert.            I spent a total of 40 minutes on the date of the service which included preparing to see the patient, face-to-face patient care, completing clinical documentation, performing a medically appropriate examination, counseling and educating the patient/family/caregiver and ordering medications, tests, or procedures.    Augusto Avery MD 09/10/24 10:17 AM

## 2024-09-11 ENCOUNTER — EVALUATION (OUTPATIENT)
Dept: PHYSICAL THERAPY | Facility: CLINIC | Age: 74
End: 2024-09-11
Payer: MEDICARE

## 2024-09-11 VITALS — DIASTOLIC BLOOD PRESSURE: 71 MMHG | OXYGEN SATURATION: 97 % | SYSTOLIC BLOOD PRESSURE: 147 MMHG | HEART RATE: 61 BPM

## 2024-09-11 DIAGNOSIS — R26.89 BALANCE DISORDER: Primary | ICD-10-CM

## 2024-09-11 DIAGNOSIS — M21.372 LEFT FOOT DROP: ICD-10-CM

## 2024-09-11 PROCEDURE — 97161 PT EVAL LOW COMPLEX 20 MIN: CPT | Mod: GP | Performed by: PHYSICAL THERAPIST

## 2024-09-11 PROCEDURE — 97110 THERAPEUTIC EXERCISES: CPT | Mod: GP | Performed by: PHYSICAL THERAPIST

## 2024-09-11 ASSESSMENT — ENCOUNTER SYMPTOMS
OCCASIONAL FEELINGS OF UNSTEADINESS: 1
DEPRESSION: 0
LOSS OF SENSATION IN FEET: 0

## 2024-09-11 ASSESSMENT — COLUMBIA-SUICIDE SEVERITY RATING SCALE - C-SSRS: 1. IN THE PAST MONTH, HAVE YOU WISHED YOU WERE DEAD OR WISHED YOU COULD GO TO SLEEP AND NOT WAKE UP?: NO

## 2024-09-11 NOTE — PROGRESS NOTES
Physical Therapy Evaluation    Patient Name: Juan Jose Roman  MRN: [id number]  Today's Date: 9/11/2024  Visit: 1/MN  Referred by: Medicare A/B; AARP supplement  Time Calculation  Start Time: 0920  Stop Time: 1040  Time Calculation (min): 80 min  Diagnosis:   1. Balance disorder  Referral to Physical Therapy    Follow Up In Physical Therapy      2. Left foot drop  Referral to Physical Therapy    Follow Up In Physical Therapy        PT Evaluation Time Entry  PT Evaluation (Low) Time Entry: 60  PT Therapeutic Procedures Time Entry  Therapeutic Exercise Time Entry: 15                   PRECAUTIONS:   HTN, CVA, lung disease, vascular disease, neurocardiogenic syncope, fall risk    Personal factors impacting care: None    SUBJECTIVE:   Patient states he has recently diagnosed with neurocardiogenic syncope. Reports getting lightheaded with standing at counter more than 1 minute. Had CVA 3 1/2 years ago, and has had residual LLE weakness since then. Reports L foot drop that has been present x 9 months. Last 3 falls over the last 9 months have been related to his leg falling asleep while sitting cross legged and his LLE falling asleep then results in him not knowing where his foot was. States he typically falls forward and to the left.Rates current lightheadedness at 3/10 intensity, typical 3/10 and 1/10 at best. Currently working with med changes for his BP.  Pain:  Does have some L hip pain which he states is a L trochanteric bursitis. Constant pain in this area up to 7/10 with pressure, typical 2/10.  Home Living:   Patient lives in [facility] [place], manages 1 flight of stairs that he manages with bilateral handrails/cane and non reciprocal gait. Recently had stair lift installed.  Prior level of function:   Patient is retired, independent self care and ADL's. Patient enjoys reading, golf, watching TV. 2-3 years ago was doing LCP 3-5 times per week doing weight machines. Has stopped going to the gym over the last 2 months.  "Slow and cautious with stepping up and down a curb.    OBJECTIVE:  Vitals: 147/71; HR 61 bpm, Pulse Ox O2 97%    Knee AROM: (degrees) Left Right   Flexion WFL's WFL's   Extension WFL's WFL's     Hip strength  Left Right   Flexion 5/5 5/5   Extension 3+/5 3=/5   abduction 4-/5 5/5   ER  5/5 5/5   IR 5/5 4/5     Knee Strength: MMT Left Right   Flexion 5/5 5/5   Extension 4+/5 5/5   Ankle df RLE 5/5, LLE 4+/5  Ankle eversion 5/5, LLE 4+/5  *denotes pain with motion or muscle testing  Stand balance: Static stand balance is Good x 4 with manual challenge  Tandem stance: LLE 10 sec; RLE 30 sec; difficulty with finding placement of LLE  Transfers: Requires moderate to maximal assistance with descending stand to sit; sit to stand-able to perform without UE assist.  Positive Special Tests: Romberg- minimal sway noted  Gait: Walks with wide YVETTE with LE's in ER's  Palpation: + TTP at L greater trochanter  Posture: L ilium elevated by approx 1/2\"    Outcome Measure:  Other Measures  Other Outcome Measures: PSFS total score 19.0 average 6.3 (sit to stand 8.5, curb step up 7, step down step 3.5)    TREATMENT:  - Therex:  Access Code: [email or web address]  URL: [email or web address]  Date: 09/11/2024  Prepared by: Kelsey Barnes    Exercises  - Standing Hip Abduction with Counter Support  - 2 x daily - 7 x weekly - 3 sets - 10 reps - 3 hold  - Standing Hip Extension with Counter Support  - 2 x daily - 7 x weekly - 2 sets - 10 reps - 2-3 hold  - Seated Heel Raise  - 2 x daily - 7 x weekly - 3 sets - 10 reps - 2-3 hold  - Seated Toe Raise  - 2 x daily - 7 x weekly - 3 sets - 10 reps - 2-3 hold  - Sit to Stand  - 3 x daily - 7 x weekly - 1 sets - 5 reps    PATIENT EDUCATION:  Outpatient Education  Individual(s) Educated: Patient  Education Provided: Home Exercise Program  Risk and Benefits Discussed with Patient/Caregiver/Other: yes  Patient/Caregiver Demonstrated Understanding: yes  Plan of Care Discussed and Agreed Upon: " yes  Patient Response to Education: Patient/Caregiver Verbalized Understanding of Information    ASSESSMENT:  Patient is 73 yo male known to myself for treatment in the past related to L hip trochanteric bursitis, gait and balance deficits. This episode patient presents with recent increased balance issues as well as general LLE weakness, and states this has resulted in 3 falls over the last 9 months. Patient presents with decreased L hip abduction and bilateral hip extension strength, as well as a leg length discrepancy which affects his gait and upright stability. Patient would benefit from skilled PT to address the above issues.    PT Assessment Results: Decreased strength, Decreased endurance, Impaired balance, Decreased mobility, Decreased safety awareness, Pain, Decreased coordination  Rehab Prognosis: Good  Evaluation/Treatment Tolerance: Patient tolerated treatment well  Strengths: Ability to acquire knowledge. Low complexity due to patient's clinical presentation being stable and uncomplicated by any significant comorbidities that may affect rehab tolerance and progression.     Clinical presentation:  Stable and/or uncomplicated characteristics,     PLAN:   Treatment/Interventions: Education/ Instruction, Gait training, Neuromuscular re-education, Self care/ home management, Therapeutic activities, Therapeutic exercises  PT Plan: Skilled PT  PT Frequency: 2 times per week  Duration: 8-10 weeks  Onset Date: 08/17/24  Certification Period Start Date: 09/11/24  Certification Period End Date: 11/25/24  Number of Treatments Authorized: MN  Rehab Potential: Good  Plan of Care Agreement: Patient    Patient goals: Patient would like to strengthen his legs, improve his balance, return to regular exercise regimen including gym program    GOALS:  Active       PT Problem       Patient will demonstrate hip extension and L abduction strength to 4+/5 on MMT       Start:  09/11/24    Expected End:  11/25/24             Patient will perform 30 sec sit to stand test at norms for age and gender (11-17 reps)       Start:  09/11/24    Expected End:  11/25/24            Patient will perform step up on curb with good stability and proper technique       Start:  09/11/24    Expected End:  11/25/24            Patient will perform reciprocal gait on stairs with min to moderate assist of handrails       Start:  09/11/24    Expected End:  11/25/24            Patient will return to indep exercise program/gym workouts, PSFS at total of 24, average score of 8 on 3 items       Start:  09/11/24    Expected End:  11/25/24

## 2024-09-19 ENCOUNTER — APPOINTMENT (OUTPATIENT)
Dept: PHYSICAL THERAPY | Facility: CLINIC | Age: 74
End: 2024-09-19
Payer: MEDICARE

## 2024-09-20 ENCOUNTER — TREATMENT (OUTPATIENT)
Dept: PHYSICAL THERAPY | Facility: CLINIC | Age: 74
End: 2024-09-20
Payer: MEDICARE

## 2024-09-20 DIAGNOSIS — R26.89 BALANCE DISORDER: ICD-10-CM

## 2024-09-20 DIAGNOSIS — M21.372 LEFT FOOT DROP: ICD-10-CM

## 2024-09-20 PROCEDURE — 97112 NEUROMUSCULAR REEDUCATION: CPT | Mod: GP | Performed by: PHYSICAL THERAPIST

## 2024-09-20 NOTE — PROGRESS NOTES
Physical Therapy Treatment    Patient Name: JuanJ ose Roman  MRN: [id number]  Today's Date: 9/20/2024   Visit 2/MN  Referral: Caro Alejandra, CNP  Onset Date: 08/17/24  Certification Period Start Date: 09/11/24  Certification Period End Date: 11/25/24  Time Calculation  Start Time: 1035  Stop Time: 1112  Time Calculation (min): 37 min  1. Balance disorder  Follow Up In Physical Therapy      2. Left foot drop  Follow Up In Physical Therapy           PT Therapeutic Procedures Time Entry  Neuromuscular Re-Education Time Entry: 35                   PRECAUTIONS:  HTN, CVA, lung disease, vascular disease, neurocardiogenic syncope, fall risk     SUBJECTIVE:  Has had a hard time standing at counter as that triggers his syncope. Felt slightly light headed with today's PT check in at front counter. Using a cane to assist him with ambulating.  Pain level: No current pain in his L hip.  Compliant with HEP? Yes    OBJECTIVE:  R tandem stance 5 sec; L tandem 30 sec  Fair dynamic balance with ambulation, uses std cane intermittently    TREATMENT:  - Neuromuscular re-ed:  Seated toe raises 2 x 10 reps  Seated heel raises 2 x 10 reps  Sit to stand x 5 reps  Standing hip extension with 3#'s 2 x 10 reps  Standing hip abduction with 3#'s 2 x 10reps  Step ups on green riser x 10 reps R/L  Stand balance on Airex x 1 min  March on Airex x 1 minute  Tandem stance R/L x 30 sec ea  Stand on half roll x 1 min    ASSESSMENT: Notes some dizziness with tandem stance activities.Good tolerance overall to new exercises added today. Initially challenged with stand on 1/2 roll and improved up to approximately 20-25 seconds.     PLAN: Continue with progressive gait and balance activities, LE and core strength.

## 2024-09-24 ENCOUNTER — APPOINTMENT (OUTPATIENT)
Dept: PHYSICAL THERAPY | Facility: CLINIC | Age: 74
End: 2024-09-24
Payer: MEDICARE

## 2024-09-26 ENCOUNTER — TREATMENT (OUTPATIENT)
Dept: PHYSICAL THERAPY | Facility: CLINIC | Age: 74
End: 2024-09-26
Payer: MEDICARE

## 2024-09-26 DIAGNOSIS — M21.372 LEFT FOOT DROP: ICD-10-CM

## 2024-09-26 DIAGNOSIS — R26.89 BALANCE DISORDER: ICD-10-CM

## 2024-09-26 PROCEDURE — 97110 THERAPEUTIC EXERCISES: CPT | Mod: GP | Performed by: PHYSICAL THERAPIST

## 2024-09-26 NOTE — PROGRESS NOTES
Physical Therapy Treatment    Patient Name: Juan Jose Roman  MRN: [id number]  Today's Date: 9/26/2024   Visit 3/MN  Referral: Caro Alejandra CNP  Onset Date: 08/17/24  Certification Period Start Date: 09/11/24  Certification Period End Date: 11/25/24  Time Calculation  Start Time: 1505  Stop Time: 1545  Time Calculation (min): 40 min  1. Balance disorder  Follow Up In Physical Therapy      2. Left foot drop  Follow Up In Physical Therapy           PT Therapeutic Procedures Time Entry  Therapeutic Exercise Time Entry: 40                   PRECAUTIONS: HTN, CVA, lung disease, vascular disease, neurocardiogenic syncope, fall risk     SUBJECTIVE:  Felt good after last session. Had to cancel his last appointment due to having the Covid vaccine and having a reaction. Feels his L hip is a bit swollen. States he saw Dr. Watson (neuro) on consult and is having multiple tests (MRI's) done to rule out neuropathy,possible conditions  Pain level: 0/10 L hip, only hurts if he lies on his L side  Compliant with HEP? Partially, due to not feeling well.    OBJECTIVE: SLS LLE 4 sec: RLE 5 sec    TREATMENT:  - Therex:  Seated toe raises 2 x 10 reps  Seated heel raises 2 x 10 reps  Sit to stand x 10 reps  Standing hip extension with 3#'s 2 x 10 reps  Standing hip abduction with 3#'s 2 x 10reps  Step ups on green riser x 10 reps R/L  Stand balance on Airex x 1 min  March on Airex x 1 minute  Tandem stance R/L x 30 sec ea  Stand on half roll x 1 min    ASSESSMENT:   Patient notes some LE weakness post session but feels generally good with the workout. He was escorted to the parking lot and into his car for this reason.    PLAN: Assess 30 sec sit to stand test next visit. Continue to progress LE strength and balance at 2 x week.

## 2024-09-30 ENCOUNTER — OFFICE VISIT (OUTPATIENT)
Dept: CARDIOLOGY | Facility: CLINIC | Age: 74
End: 2024-09-30
Payer: MEDICARE

## 2024-09-30 VITALS
HEIGHT: 71 IN | WEIGHT: 193.3 LBS | BODY MASS INDEX: 27.06 KG/M2 | DIASTOLIC BLOOD PRESSURE: 80 MMHG | TEMPERATURE: 98 F | SYSTOLIC BLOOD PRESSURE: 172 MMHG | HEART RATE: 73 BPM

## 2024-09-30 DIAGNOSIS — I49.3 PVC (PREMATURE VENTRICULAR CONTRACTION): ICD-10-CM

## 2024-09-30 DIAGNOSIS — I10 ESSENTIAL HYPERTENSION: ICD-10-CM

## 2024-09-30 DIAGNOSIS — E78.5 HYPERLIPIDEMIA, UNSPECIFIED HYPERLIPIDEMIA TYPE: ICD-10-CM

## 2024-09-30 DIAGNOSIS — I25.119 CORONARY ARTERY DISEASE INVOLVING NATIVE CORONARY ARTERY OF NATIVE HEART WITH ANGINA PECTORIS: ICD-10-CM

## 2024-09-30 DIAGNOSIS — I73.9 PAD (PERIPHERAL ARTERY DISEASE) (CMS-HCC): ICD-10-CM

## 2024-09-30 DIAGNOSIS — J44.9 CHRONIC OBSTRUCTIVE PULMONARY DISEASE, UNSPECIFIED COPD TYPE (MULTI): ICD-10-CM

## 2024-09-30 DIAGNOSIS — R29.898 WEAKNESS OF LEFT LOWER EXTREMITY: ICD-10-CM

## 2024-09-30 DIAGNOSIS — I35.0 NONRHEUMATIC AORTIC VALVE STENOSIS: ICD-10-CM

## 2024-09-30 DIAGNOSIS — R42 LIGHTHEADED: Primary | ICD-10-CM

## 2024-09-30 DIAGNOSIS — I70.0 ATHEROSCLEROSIS OF AORTA (CMS-HCC): ICD-10-CM

## 2024-09-30 DIAGNOSIS — I63.9 CEREBRAL INFARCTION, UNSPECIFIED MECHANISM (MULTI): ICD-10-CM

## 2024-09-30 PROCEDURE — 3077F SYST BP >= 140 MM HG: CPT | Performed by: NURSE PRACTITIONER

## 2024-09-30 PROCEDURE — 99215 OFFICE O/P EST HI 40 MIN: CPT | Performed by: NURSE PRACTITIONER

## 2024-09-30 PROCEDURE — 1036F TOBACCO NON-USER: CPT | Performed by: NURSE PRACTITIONER

## 2024-09-30 PROCEDURE — 1160F RVW MEDS BY RX/DR IN RCRD: CPT | Performed by: NURSE PRACTITIONER

## 2024-09-30 PROCEDURE — 1123F ACP DISCUSS/DSCN MKR DOCD: CPT | Performed by: NURSE PRACTITIONER

## 2024-09-30 PROCEDURE — 1159F MED LIST DOCD IN RCRD: CPT | Performed by: NURSE PRACTITIONER

## 2024-09-30 PROCEDURE — 1126F AMNT PAIN NOTED NONE PRSNT: CPT | Performed by: NURSE PRACTITIONER

## 2024-09-30 PROCEDURE — 3079F DIAST BP 80-89 MM HG: CPT | Performed by: NURSE PRACTITIONER

## 2024-09-30 RX ORDER — SPIRONOLACTONE 25 MG/1
12.5 TABLET ORAL DAILY
Qty: 45 TABLET | Refills: 3 | Status: SHIPPED | OUTPATIENT
Start: 2024-09-30 | End: 2025-09-30

## 2024-09-30 RX ORDER — AMMONIUM LACTATE 12 G/100G
LOTION TOPICAL
COMMUNITY
Start: 2024-06-03

## 2024-09-30 ASSESSMENT — PAIN SCALES - GENERAL: PAINLEVEL: 0-NO PAIN

## 2024-09-30 NOTE — PROGRESS NOTES
"Chief Complaint:   Dizzy     History Of Present Illness:    Juan Jose Roman \"Wanda" is a 74 y.o. male here for dizziness. Started 8/17/2024. Presented to hospital with feeling dizzy and near syncopal. Had been feeling off balance prior to presentation. Became dizzy and left lower extremity gave out. Was not syncopal. Cr noted to be mildly elevated upon admit. Given IV hydration. Nothing untoward noted CT of head. Troponin -2.     Reports he will feel dizzy after he has been standing for awhile, particularly when he walks up to a counter. Will have to sit for several minutes then it will resolve.     Nothing untoward on loop recorder. Symptoms were unchanged with changes in medications and lifestyle (hydration and compression socks).  Metoprolol was cut in 1/2 with no change in symptoms.   Notes increase in LE edema.     No sonographic evidence of abdominal aortic aneurysm-4/12/23  Frequent PVC's/RFA 6/10/15 [Severe RCA - EMILY] - 4/24/2015  PVCs [RFA] - 12/20/2019  Cardiac Calcium Score (Total 722:) - 1/2008    MPI (Negative for ischemia. Moderate LV thickness. Mild AVR) - 6/1/2022    Brother aortic dissection.     Allergies:  Ipratropium bromide, Tetracycline, Tetracycline hcl, Valsartan-hydrochlorothiazide, and Plavix [clopidogrel]    Review of Systems  All pertinent systems have been reviewed and are negative except for what is stated in the history of present illness.    All other systems have been reviewed and are negative and noncontributory to this patient's current ailments.     Visit Vitals  /80 (BP Location: Right arm, Patient Position: Sitting, BP Cuff Size: Adult)   Pulse 73   Temp 36.7 °C (98 °F) (Temporal)   Ht 1.803 m (5' 11\")   Wt 87.7 kg (193 lb 4.8 oz)   BMI 26.96 kg/m²   Smoking Status Former   BSA 2.1 m²     Objective   Vitals reviewed.   Constitutional:       Appearance: Healthy appearance. Not in distress.   Eyes:      Conjunctiva/sclera: Conjunctivae normal.   Neck:      Vascular: No JVR. " JVD normal.   Pulmonary:      Effort: Pulmonary effort is normal.      Breath sounds: Normal breath sounds. No wheezing. No rhonchi. No rales.   Chest:      Chest wall: Not tender to palpatation.   Cardiovascular:      PMI at left midclavicular line. Normal rate. Regular rhythm. Normal S1. Normal S2.       Murmurs: There is no murmur.      No gallop.  No click. No rub.   Edema:     Peripheral edema absent.   Abdominal:      General: Bowel sounds are normal.      Palpations: Abdomen is soft.      Tenderness: There is no abdominal tenderness.   Musculoskeletal: Normal range of motion.         General: No tenderness. Skin:     General: Skin is warm and dry.   Neurological:      General: No focal deficit present.      Mental Status: Alert and oriented to person, place and time.   Psychiatric:         Attention and Perception: Attention normal.         Mood and Affect: Mood normal.       Last Labs:  CBC -  Lab Results   Component Value Date    WBC 10.9 12/08/2023    HGB 15.6 12/08/2023    HCT 49.8 12/08/2023    MCV 95 12/08/2023     12/08/2023       CMP -  Lab Results   Component Value Date    CALCIUM 9.4 08/27/2024    PHOS 4.4 03/31/2021    PROT 6.5 04/04/2023    ALBUMIN 4.2 04/04/2023    AST 20 04/04/2023    ALT 36 12/08/2023    ALKPHOS 53 04/04/2023    BILITOT 0.7 04/04/2023    BUN 26 (H) 08/27/2024    CREATININE 1.58 (H) 08/27/2024       LIPID PANEL -   Lab Results   Component Value Date    CHOL 137 12/08/2023    TRIG 59 12/08/2023    HDL 68.5 12/08/2023    CHHDL 2.0 12/08/2023    LDLF 69 04/04/2023    VLDL 12 12/08/2023    NHDL 69 12/08/2023       RENAL FUNCTION PANEL -   Lab Results   Component Value Date    GLUCOSE 104 (H) 08/27/2024     08/27/2024    K 4.8 08/27/2024     08/27/2024    CO2 27 08/27/2024    ANIONGAP 11 08/27/2024    BUN 26 (H) 08/27/2024    CREATININE 1.58 (H) 08/27/2024    GFRMALE 54 (A) 04/04/2023    CALCIUM 9.4 08/27/2024    PHOS 4.4 03/31/2021    ALBUMIN 4.2 04/04/2023         Lab Results   Component Value Date    HGBA1C 5.7 (H) 06/11/2024         Assessment/Plan   Diagnoses and all orders for this visit:  Lightheaded   - I suspect there is some degree of dehydration AEB Cr elevation upon hospital arrival then subsequent improvement with IV hydration  - vasovagal  - discussed compression socks  - notes LE edema since stopping jessica. Stop amlodipine and start low dose jessica. They will update me on LE edema.   Coronary artery disease involving native coronary artery of native heart with angina pectoris (CMS-HCC)  - EKG NSR 81pm, no ischemic changes  - denies chest pain and SOB  - statin/asa  PVC (premature ventricular contraction)  - well controlled   - loop recorder, nothing untoward on recent check   Essential hypertension  - well controlled per home readings  Hyperlipidemia, unspecified hyperlipidemia type  - statin  Cerebral infarction, unspecified mechanism (Multi)  - statin/asa  - loop recorder  PAD (peripheral artery disease) (CMS-HCC)  - followed with vascular, no intervention warranted   LE weakness/ balance issues/drop foot  - referred to PT  Atherosclerosis of aorta (CMS-HCC)  - noted on previous US  - following with vascular   Chronic obstructive pulmonary disease, unspecified COPD type (Multi)  - per pulmonology   Aortic Stenosis   - mild     Follow up post neuro testing    Current Outpatient Medications on File Prior to Visit   Medication Sig Dispense Refill    albuterol 90 mcg/actuation inhaler Inhale 2 puffs every 6 hours if needed.      ammonium lactate (Lac-Hydrin) 12 % lotion APPLY TO THE AFFECTED AREAS TWICE DAILY AFTER SHOWER OR BATH      Anoro Ellipta 62.5-25 mcg/actuation blister with device Inhale 1 puff once daily.      aspirin 81 mg capsule Take 1 tablet by mouth once daily.      atorvastatin (Lipitor) 40 mg tablet TAKE 1 TABLET BY MOUTH EVERY DAY 90 tablet 2    buPROPion (Wellbutrin) 100 mg tablet Take 1 tablet (100 mg) by mouth once daily in the morning. Take  before meals.      calcium carbonate (Tums) 200 mg calcium chewable tablet Chew 1 tablet (500 mg) once daily.      cholecalciferol (Vitamin D-3) 125 MCG (5000 UT) capsule Take 1 capsule (125 mcg) by mouth once daily.      cholestyramine light (Prevalite) 4 gram packet Take by mouth.      clonazePAM (KlonoPIN) 0.5 mg tablet Take 1 tablet (0.5 mg) by mouth every 12 hours if needed.      famotidine (Pepcid) 40 mg tablet Take 1 tablet (40 mg) by mouth once every 24 hours. 90 tablet 3    fluticasone (Flonase) 50 mcg/actuation nasal spray Administer 1 spray into each nostril once daily.      levothyroxine (Synthroid, Levoxyl) 88 mcg tablet Take 1 tablet (88 mcg) by mouth once daily. 90 tablet 3    losartan (Cozaar) 50 mg tablet TAKE 1 TABLET BY MOUTH TWICE A  tablet 2    melatonin 5 mg tablet,chewable Chew.      [DISCONTINUED] metoprolol succinate XL (Toprol-XL) 25 mg 24 hr tablet TAKE 1 TABLET BY MOUTH EVERY DAY (Patient taking differently: Take 1 tablet (25 mg) by mouth once daily. Take 0.5 pill 12.5mg) 90 tablet 3    beneprotein Take by mouth once daily.      ipratropium (Atrovent) 21 mcg (0.03 %) nasal spray USE 2 APPLICATIONS IN INTO EACH NOSTRIL TWICE A DAY AS NEEDED      tadalafil (Cialis) 5 mg tablet Take 1 tablet (5 mg) by mouth once daily. 30 tablet 3    [DISCONTINUED] amLODIPine (Norvasc) 2.5 mg tablet Take 1 tablet (2.5 mg) by mouth once daily.       No current facility-administered medications on file prior to visit.         Exclusive of any other services or procedures performed, Caro SAGASTUME, spent 45 minutes in duration for this visit today.  This time consisted of chart review, obtaining history, and/or performing the exam as documented above, as well as, documenting the clinical information for the encounter in the electronic record, discussing treatment options, plans, and/or goals with patient, family, and/or caregiver, refilling medications, updating the electronic record, ordering  medicines, lab work, imaging, referrals, and/or procedures as documented above and communicating with other [facility] professionals. I have discussed the results of laboratory, radiology, and cardiology studies with the patient and their family/caregiver.

## 2024-10-03 ENCOUNTER — APPOINTMENT (OUTPATIENT)
Dept: PHYSICAL THERAPY | Facility: CLINIC | Age: 74
End: 2024-10-03
Payer: MEDICARE

## 2024-10-08 ENCOUNTER — TREATMENT (OUTPATIENT)
Dept: PHYSICAL THERAPY | Facility: CLINIC | Age: 74
End: 2024-10-08
Payer: MEDICARE

## 2024-10-08 DIAGNOSIS — R26.89 BALANCE DISORDER: ICD-10-CM

## 2024-10-08 DIAGNOSIS — M21.372 LEFT FOOT DROP: ICD-10-CM

## 2024-10-08 PROCEDURE — 97110 THERAPEUTIC EXERCISES: CPT | Mod: GP | Performed by: PHYSICAL THERAPIST

## 2024-10-08 NOTE — PROGRESS NOTES
"  Physical Therapy Treatment    Patient Name: Juan Jose Roman  MRN: [id number]  Today's Date: 10/8/2024  Visit 4 /MN  Referral: Caro Alejandra CNP  Onset Date: 08/17/24  Certification Period Start Date: 09/11/24  Certification Period End Date: 11/25/24  Time Calculation  Start Time: 1400  Stop Time: 1442  Time Calculation (min): 42 min  1. Balance disorder  Follow Up In Physical Therapy      2. Left foot drop  Follow Up In Physical Therapy        Problem List Items Addressed This Visit             ICD-10-CM    Balance disorder R26.89    Left foot drop M21.372        PT Therapeutic Procedures Time Entry  Therapeutic Exercise Time Entry: 42                   PRECAUTIONS: HTN, CVA, lung disease, vascular disease, neurocardiogenic syncope, fall risk     SUBJECTIVE:  Did the TM today for 30 minutes this morning so states his legs are a little wobbly. Missed last appointment due to not feeling well. \"This neurogenic syncope really knocks me out.\" \"My HR is still too fast.\" \"They are still trying to get my meds right.\"States he has been walking without his cane x 4-5 days.  Pain level: 0/10  Compliant with HEP? Yes    OBJECTIVE:  SLS LLE 5 sec; SLS RLE 6 sec    Hip strength  Left Right   Flexion 5/5 5/5   Extension 3+/5 3+/5   abduction 5/5 5/5   Bilateral knee extension 5/5  Outcome Measure:  Other Measures  Other Outcome Measures: PSFS total 27, average 9 (sit to stand 9, ascend curb 9, descend step 9)    TREATMENT:  - Therex:  Seated toe raises 2 x 10 reps  Seated heel raises 2 x 10 reps  Sit to stand x 10 reps  Standing hip extension with 3#'s 2 x 10 reps  Standing hip abduction with 3#'s 2 x 10 reps  Step ups on green riser x 10 reps R/L  Stand balance on Airex x 1 min  March on Airex x 1 minute  Tandem stance R/L x 30 sec ea  Stand on half roll x 1 min    ASSESSMENT: Patient is progressing with hip strength and upright abilities as evidenced by his improvement on his PSFS. Noted to have significant edema of his L hip " greater trochanter and advised to follow up with his ortho for possible drainage of this.     PLAN: Consider add of step ups on stair steps vs green riser. Assess 30 sec sit to stand test next visit as not done today.  Continue to progress LE strength and balance at 2 x week.

## 2024-10-10 ENCOUNTER — TREATMENT (OUTPATIENT)
Dept: PHYSICAL THERAPY | Facility: CLINIC | Age: 74
End: 2024-10-10
Payer: MEDICARE

## 2024-10-10 DIAGNOSIS — M21.372 LEFT FOOT DROP: ICD-10-CM

## 2024-10-10 DIAGNOSIS — R26.89 BALANCE DISORDER: ICD-10-CM

## 2024-10-10 PROCEDURE — 97110 THERAPEUTIC EXERCISES: CPT | Mod: GP | Performed by: PHYSICAL THERAPIST

## 2024-10-10 NOTE — PROGRESS NOTES
"  Physical Therapy Treatment    Patient Name: Juan Jose Roman  MRN: [id number]  Today's Date: 10/10/2024  Visit 5 /Mn  Referral: Caro Alejandra CNP  Onset Date: 08/17/24  Certification Period Start Date: 09/11/24  Certification Period End Date: 11/25/24  Time Calculation  Start Time: 1500  Stop Time: 1530  Time Calculation (min): 30 min  1. Balance disorder  Follow Up In Physical Therapy      2. Left foot drop  Follow Up In Physical Therapy        Problem List Items Addressed This Visit             ICD-10-CM    Balance disorder R26.89    Left foot drop M21.372        PT Therapeutic Procedures Time Entry  Therapeutic Exercise Time Entry: 30                   PRECAUTIONS: HTN, CVA, lung disease, vascular disease, neurocardiogenic syncope, fall risk     SUBJECTIVE:  Patient had drugs for MRI last night at 6 pm, then went to dentist this morning so feeling \"druggy\". States he obtained 3 lb ankle weights for home use.  Pain level: 0/10  Compliant with HEP? partially    OBJECTIVE:  Tandem stance R/L 30 sec, L/R 30 sec  Outcome Measure:   30 sec sit to stand is 8 reps     TREATMENT:  - Therex:  Seated toe raises 2 x 10 reps  Seated heel raises 2 x 10 reps  Sit to stand x 10 reps  Standing hip extension with 3#'s 2 x 10 reps  Standing hip abduction with 3#'s 2 x 10 reps  Step ups on green riser x 10 reps R/L  Stand balance on Airex x 1 min  March on Airex x 1 minute  Tandem stance R/L x 30 sec ea  Stand on half roll x 1 min    ASSESSMENT:   Patient declined trying stairs today due to decreased endurance and legs feeling weak.Became somewhat winded with step ups today. Overall demonstrating improved balance abilities with improved tandem stance.    PLAN: continue at 2 x weeks with progressive LE and core strength, balance activities.            "

## 2024-10-30 ENCOUNTER — TREATMENT (OUTPATIENT)
Dept: PHYSICAL THERAPY | Facility: CLINIC | Age: 74
End: 2024-10-30
Payer: MEDICARE

## 2024-10-30 ENCOUNTER — HOSPITAL ENCOUNTER (OUTPATIENT)
Dept: CARDIOLOGY | Facility: CLINIC | Age: 74
Discharge: HOME | End: 2024-10-30
Payer: MEDICARE

## 2024-10-30 DIAGNOSIS — I63.9 CEREBRAL INFARCTION, UNSPECIFIED: ICD-10-CM

## 2024-10-30 DIAGNOSIS — M21.372 LEFT FOOT DROP: ICD-10-CM

## 2024-10-30 DIAGNOSIS — R26.89 BALANCE DISORDER: ICD-10-CM

## 2024-10-30 DIAGNOSIS — E78.2 MIXED HYPERLIPIDEMIA: ICD-10-CM

## 2024-10-30 DIAGNOSIS — K21.9 GASTROESOPHAGEAL REFLUX DISEASE, UNSPECIFIED WHETHER ESOPHAGITIS PRESENT: ICD-10-CM

## 2024-10-30 PROCEDURE — 93298 REM INTERROG DEV EVAL SCRMS: CPT

## 2024-10-30 PROCEDURE — 97110 THERAPEUTIC EXERCISES: CPT | Mod: GP | Performed by: PHYSICAL THERAPIST

## 2024-10-30 PROCEDURE — 93298 REM INTERROG DEV EVAL SCRMS: CPT | Performed by: INTERNAL MEDICINE

## 2024-10-30 RX ORDER — FAMOTIDINE 40 MG/1
40 TABLET, FILM COATED ORAL EVERY 24 HOURS
Qty: 90 TABLET | Refills: 3 | Status: SHIPPED | OUTPATIENT
Start: 2024-10-30 | End: 2025-10-30

## 2024-10-30 RX ORDER — ATORVASTATIN CALCIUM 40 MG/1
40 TABLET, FILM COATED ORAL DAILY
Qty: 90 TABLET | Refills: 3 | Status: SHIPPED | OUTPATIENT
Start: 2024-10-30

## 2024-11-08 NOTE — PROGRESS NOTES
Chief Complaint   Patient presents with    Follow-up     HPI:  Juan Jose Roman is a 74 y.o. male following up with me today for his nasopharyngeal cysts. Last seen 2/2023. Reports he has been having issues with dizziness and syncope. He has had an extensive work up. He is wondering if the dizziness could e related to his ears. He has been told that he has cerumen Impaction but no one could clean it for him.  No change in smell, no change in hearing.  No epistaxis.      Social History  He reports that he quit smoking about 3 years ago. His smoking use included cigarettes. He started smoking about 43 years ago. He has a 60 pack-year smoking history. He has never been exposed to tobacco smoke. He has never used smokeless tobacco. He reports that he does not currently use alcohol. He reports that he does not use drugs.    History:  Dx:  Nasopharyngeal cysts  4/21/18: History of pulmonary nodules and underwent repeat scan with some growth so a PET scan was obtained which was negative. I do not have access to the PET images as this was obtained at [facility]  5/1/18: First evaluated by Dr. Galicia for nasal congestion and a nasal endoscopy revealed two nasopharyngeal cystic structures.   5/7/18: CT neck +right nasopharyngeal asymmetry with a medialized right internal carotid artery. No masses or lymphadenopathy.   11/18/24: Stable nasopharyngeal cysts.      SH:  Tob: +cig  ETOH: 20 beers/week    ROS:  Review of Systems   Constitutional:  Negative for appetite change, chills, fatigue, fever and unexpected weight change.   HENT:  Negative for dental problem, drooling, ear pain, facial swelling, hearing loss, mouth sores, sore throat, tinnitus, trouble swallowing and voice change.    Respiratory:  Negative for cough, shortness of breath and stridor.    Gastrointestinal:  Negative for nausea and vomiting.   Musculoskeletal:  Negative for neck pain.   Neurological:  Positive for dizziness, syncope and light-headedness.    Hematological:  Negative for adenopathy.        PE:  ENT Physical Exam  Constitutional  Appearance: patient appears well-developed, patient is cooperative;  Communication/Voice: communication appropriate for developmental age;  Head and Face  Appearance: head appears normal and face appears normal;  Ear  Auricles: right auricle normal; left auricle normal;  Ear Canals: right ear canal normal;  Tympanic Membranes: right tympanic membrane normal;  Ear comments: +left cerumen impaction, cleaned, see below.  After cleaning left EAC & TM normal.  Nose  External Nose: nares patent bilaterally; external nose normal;  Internal Nose: nasal mucosa normal; septum normal;  Oral Cavity/Oropharynx  Lips: normal;  Gums: gingiva normal;  Tongue: normal;  Oral mucosa: normal;  Neck  Neck: neck normal;  Thyroid: thyroid normal;  Respiratory  Inspection: breathing unlabored;  Cardiovascular  Inspection: extremities are warm and well perfused;  Lymphatic  Palpation: no cervical adenopathy noted;  Neurovestibular  Mental Status: alert and oriented;  Psychiatric: mood normal; affect is appropriate;  Cranial Nerves: cranial nerves intact;       Ear cerumen removal    Date/Time: 11/18/2024 3:32 PM    Performed by: Giselle Sheridan MD  Authorized by: Giselle Sheridan MD    Consent:     Consent obtained:  Verbal    Consent given by:  Patient    Risks, benefits, and alternatives were discussed: yes      Risks discussed:  Incomplete removal    Alternatives discussed:  No treatment  Universal protocol:     Patient identity confirmed:  Verbally with patient  Procedure details:     Location:  L ear    Procedure type: curette      Procedure outcomes: cerumen removed    Post-procedure details:     Inspection:  Ear canal clear    Hearing quality:  Normal    Procedure completion:  Tolerated well, no immediate complications     PROCEDURE NOTE:  Recommended flexible nasopharyngoscopy.  Risks, benefits, personnel and alternatives were explained.  The  patient wished to proceed.  S/he was re-identified.  PROCEDURE:  Flexible Nasopharyngoscopy  PREOPERATIVE DIAGNOSIS: Nasopharyngeal cysts  POSTOPERATIVE DIAGNOSIS: Same as above, Prior nasopharyngeal cysts are stable, no change in size they remain the same and do not impact the nasopharynx patency or the ET orifice  INDICATIONS: Inability to tolerate mirror exam  ANESTHESIA: 4% lidocaine and 0.5% phenylephrine  PROCEDURE:  With the patient sitting upright topical anesthesia and vasoconstriction was applied with spray to the right side(s) of the nose.  After waiting an appropriate period of time for anesthesia/vasoconstriction to become effective, a flexible laryngoscope was passed through the right side(s) of the nose.  Nares and nasopharynx were examined.  FINDINGS:  The nares showed +septal deviation and turbinate hypertrophy.  Upon entrance into the nasopharynx there are two superior cysts. The more medial cyst contains clear serous fluid and the second more mucinous fluid. Both remain benign appearing. Both stable in size for >5 years. These are located in the very superior nasopharynx. Due to their superior location they do not block the nasopharyngeal airway. They do not impact the ET orifices bilaterally. Both cysts are unchanged in size over the last year.  The scope advanced into the oropharynx, VC are bilaterally mobile. Patient tolerated the procedure well, and there were no complications.      ASSESSMENT AND PLAN:  Problem List Items Addressed This Visit       Balance disorder - Primary    Current Assessment & Plan     Unlikely to be related to his ears - exam was normal except for cerumen which was removed from the left side today         Impacted cerumen of left ear    Current Assessment & Plan     Successfully cleaned  I do not feel this is impacting his dizziness or lightheadedness/syncope         Cyst of nasopharynx    Current Assessment & Plan     Present x2  Stable for many yrs (>5)   Seen on  endoscopy today  Reassurance provided  No follow up needed           Giselle Sheridan MD    Head & Neck Surgical Oncology & Reconstruction  Department of Otolaryngology - Head and Neck Surgery     By signing my name below, I, Tonio Maya, attest that this documentation has been prepared under the direction and in the presence of Dr. Giselle Sheridan MD.     All medical record entries made by the [facility] were at my direction and personally dictated by me, Dr. Giselle Sheridan. I have reviewed the chart and agree that the record accurately reflects my personal performance of the history, physical exam, discussion and plan.

## 2024-11-18 ENCOUNTER — APPOINTMENT (OUTPATIENT)
Dept: OTOLARYNGOLOGY | Facility: CLINIC | Age: 74
End: 2024-11-18
Payer: MEDICARE

## 2024-11-18 ENCOUNTER — HOSPITAL ENCOUNTER (OUTPATIENT)
Dept: CARDIOLOGY | Facility: CLINIC | Age: 74
Discharge: HOME | End: 2024-11-18
Payer: MEDICARE

## 2024-11-18 ENCOUNTER — APPOINTMENT (OUTPATIENT)
Dept: PHYSICAL THERAPY | Facility: CLINIC | Age: 74
End: 2024-11-18
Payer: MEDICARE

## 2024-11-18 VITALS — TEMPERATURE: 96.8 F | WEIGHT: 189 LBS | BODY MASS INDEX: 26.46 KG/M2 | HEIGHT: 71 IN

## 2024-11-18 DIAGNOSIS — H61.22 IMPACTED CERUMEN OF LEFT EAR: ICD-10-CM

## 2024-11-18 DIAGNOSIS — J39.2 CYST OF NASOPHARYNX: ICD-10-CM

## 2024-11-18 DIAGNOSIS — R26.89 BALANCE DISORDER: Primary | ICD-10-CM

## 2024-11-18 DIAGNOSIS — I63.9 CEREBRAL INFARCTION, UNSPECIFIED: ICD-10-CM

## 2024-11-18 PROCEDURE — 69210 REMOVE IMPACTED EAR WAX UNI: CPT | Performed by: OTOLARYNGOLOGY

## 2024-11-18 PROCEDURE — 1159F MED LIST DOCD IN RCRD: CPT | Performed by: OTOLARYNGOLOGY

## 2024-11-18 PROCEDURE — 1123F ACP DISCUSS/DSCN MKR DOCD: CPT | Performed by: OTOLARYNGOLOGY

## 2024-11-18 PROCEDURE — 92511 NASOPHARYNGOSCOPY: CPT | Performed by: OTOLARYNGOLOGY

## 2024-11-18 PROCEDURE — 99214 OFFICE O/P EST MOD 30 MIN: CPT | Performed by: OTOLARYNGOLOGY

## 2024-11-18 PROCEDURE — 1160F RVW MEDS BY RX/DR IN RCRD: CPT | Performed by: OTOLARYNGOLOGY

## 2024-11-18 ASSESSMENT — PATIENT HEALTH QUESTIONNAIRE - PHQ9
2. FEELING DOWN, DEPRESSED OR HOPELESS: NOT AT ALL
1. LITTLE INTEREST OR PLEASURE IN DOING THINGS: NOT AT ALL
SUM OF ALL RESPONSES TO PHQ9 QUESTIONS 1 AND 2: 0

## 2024-11-18 ASSESSMENT — ENCOUNTER SYMPTOMS
OCCASIONAL FEELINGS OF UNSTEADINESS: 0
DEPRESSION: 0
LOSS OF SENSATION IN FEET: 0

## 2024-11-19 PROBLEM — J39.2 CYST OF NASOPHARYNX: Status: ACTIVE | Noted: 2024-11-19

## 2024-11-19 ASSESSMENT — ENCOUNTER SYMPTOMS
APPETITE CHANGE: 0
VOICE CHANGE: 0
NECK PAIN: 0
NAUSEA: 0
VOMITING: 0
DIZZINESS: 1
STRIDOR: 0
TROUBLE SWALLOWING: 0
FACIAL SWELLING: 0
ADENOPATHY: 0
FEVER: 0
SORE THROAT: 0
CHILLS: 0
UNEXPECTED WEIGHT CHANGE: 0
SHORTNESS OF BREATH: 0
LIGHT-HEADEDNESS: 1
COUGH: 0
FATIGUE: 0

## 2024-11-19 NOTE — ASSESSMENT & PLAN NOTE
Present x2  Stable for many yrs (>5)   Seen on endoscopy today  Reassurance provided  No follow up needed

## 2024-11-19 NOTE — ASSESSMENT & PLAN NOTE
Unlikely to be related to his ears - exam was normal except for cerumen which was removed from the left side today   PRINCIPAL PROCEDURE  Procedure: CABG, with MARY ALICE  Findings and Treatment:

## 2024-12-02 ENCOUNTER — TELEPHONE (OUTPATIENT)
Dept: PRIMARY CARE | Facility: CLINIC | Age: 74
End: 2024-12-02
Payer: MEDICARE

## 2024-12-03 ENCOUNTER — APPOINTMENT (OUTPATIENT)
Dept: PHYSICAL THERAPY | Facility: CLINIC | Age: 74
End: 2024-12-03
Payer: MEDICARE

## 2024-12-04 ENCOUNTER — LAB (OUTPATIENT)
Dept: LAB | Facility: LAB | Age: 74
End: 2024-12-04
Payer: MEDICARE

## 2024-12-04 DIAGNOSIS — J44.1 CHRONIC OBSTRUCTIVE PULMONARY DISEASE WITH (ACUTE) EXACERBATION (MULTI): Primary | ICD-10-CM

## 2024-12-04 LAB
BASOPHILS # BLD AUTO: 0.12 X10*3/UL (ref 0–0.1)
BASOPHILS NFR BLD AUTO: 1.5 %
EOSINOPHIL # BLD AUTO: 0.53 X10*3/UL (ref 0–0.4)
EOSINOPHIL NFR BLD AUTO: 6.6 %
ERYTHROCYTE [DISTWIDTH] IN BLOOD BY AUTOMATED COUNT: 14.9 % (ref 11.5–14.5)
HCT VFR BLD AUTO: 47.2 % (ref 41–52)
HGB BLD-MCNC: 15 G/DL (ref 13.5–17.5)
IMM GRANULOCYTES # BLD AUTO: 0.09 X10*3/UL (ref 0–0.5)
IMM GRANULOCYTES NFR BLD AUTO: 1.1 % (ref 0–0.9)
LYMPHOCYTES # BLD AUTO: 1.38 X10*3/UL (ref 0.8–3)
LYMPHOCYTES NFR BLD AUTO: 17.3 %
MCH RBC QN AUTO: 28.4 PG (ref 26–34)
MCHC RBC AUTO-ENTMCNC: 31.8 G/DL (ref 32–36)
MCV RBC AUTO: 89 FL (ref 80–100)
MONOCYTES # BLD AUTO: 0.49 X10*3/UL (ref 0.05–0.8)
MONOCYTES NFR BLD AUTO: 6.1 %
NEUTROPHILS # BLD AUTO: 5.36 X10*3/UL (ref 1.6–5.5)
NEUTROPHILS NFR BLD AUTO: 67.4 %
NRBC BLD-RTO: 0 /100 WBCS (ref 0–0)
PLATELET # BLD AUTO: 268 X10*3/UL (ref 150–450)
RBC # BLD AUTO: 5.29 X10*6/UL (ref 4.5–5.9)
WBC # BLD AUTO: 8 X10*3/UL (ref 4.4–11.3)

## 2024-12-04 PROCEDURE — 36415 COLL VENOUS BLD VENIPUNCTURE: CPT

## 2024-12-04 PROCEDURE — 85025 COMPLETE CBC W/AUTO DIFF WBC: CPT

## 2024-12-09 ENCOUNTER — TREATMENT (OUTPATIENT)
Dept: PHYSICAL THERAPY | Facility: CLINIC | Age: 74
End: 2024-12-09
Payer: MEDICARE

## 2024-12-09 DIAGNOSIS — M21.372 LEFT FOOT DROP: ICD-10-CM

## 2024-12-09 DIAGNOSIS — R26.89 BALANCE DISORDER: ICD-10-CM

## 2024-12-09 PROCEDURE — 97164 PT RE-EVAL EST PLAN CARE: CPT | Mod: GP | Performed by: PHYSICAL THERAPIST

## 2024-12-09 PROCEDURE — L3350 SHOE HEEL WEDGE: HCPCS | Performed by: PHYSICAL THERAPIST

## 2024-12-09 PROCEDURE — 97110 THERAPEUTIC EXERCISES: CPT | Mod: GP | Performed by: PHYSICAL THERAPIST

## 2024-12-09 NOTE — PROGRESS NOTES
Physical Therapy Treatment and Progress Report/RE-eval    Patient Name: Juan Jose Roman  MRN: [id number]  Today's Date: 12/9/2024  Visit 7 /MN  FIONA Youssef-DICK Watson  Initial Cert date: 09/11/24- 11/25/24  Recert date: 11/26/24- 01/31/25  Time Calculation  Start Time: 0905  Stop Time: 0955  Time Calculation (min): 50 min  1. Balance disorder  Follow Up In Physical Therapy      2. Left foot drop  Follow Up In Physical Therapy        Problem List Items Addressed This Visit             ICD-10-CM    Balance disorder R26.89    Left foot drop M21.372     PT Evaluation Time Entry  PT Re-Evaluation Time Entry: 40  PT Therapeutic Procedures Time Entry  Therapeutic Exercise Time Entry: 8                   PRECAUTIONS: HTN, CVA, lung disease, vascular disease, neurocardiogenic syncope, fall risk   Fall Risk: Low    SUBJECTIVE:  Patient was last seen on 10/30/24 and was progressing well toward his previous goals and since them has met them. In the meantime, he went to see Dr. Watson (neurologist) and after having MRI's for neurocardiogenic syncope. Upon having MRI's was diagnosed with spinal stenosis. States his foot drop has been improving with his home exercises. States also shows white matter and old CVA on brain scan that could be causing the vertigo. Currently reports weakness and achiness in B LE's L> RLE. Patient has new PT orders from Dr. Watson to continue PT for his back pain and LE pain and weakness due to his spinal stenosis. Saw Dr. Gomez at [facility] for his spine who recommends spinal surgery. Has difficulty walking on uneven surfaces.  Pain level: Rates pain at 4/10 in his LB and bilateral LE's.  Compliant with HEP? Yes    OBJECTIVE:  Lumbar AROM: (% movement)   Flexion 60%   Extension 50%   Right Sidebend 70%   Left Sidebend 50%     Lumbar Movement Response (single rep)  Repeated reps   Flexion in standing NE NT   Extension in standing NE NT   Right Sideglide NE NT   Left Sideglide NE  "NT   Flexion in lying NT NT   Extension in lying NT NT     Posture: Poor, diminished lordosis, Stands in R Hip ER    Leg length: L ilium is approximately 1\" higher than R    Core Strength:  Hip abduction R 5/5, L 4/5  Hip extension 3/5 B  Hip ER 5/5 B  Hip IR 5/5 B  Transverse abdominis NT    Palpation: no significant TTP at lumbar spine    Special Testing: NA    Myotomal Impairment:  L2 Hip flexion 4+/5 B  L3 Knee extension 5/5 B  Knee flexion 5/5 B  L4 ankle df 5/5 B  L5 EHL- NT  S1 Plantar flexion/ev 5/5 B  Outcome Measure:  Other Measures  Oswestry Disablity Index (ZEV): 22% disability    TREATMENT:  - Therex:   Access Code: [email or web address]  URL: [email or web address]  Date: 12/09/2024  Prepared by: Kelsey Barnes    Exercises  - Seated Flexion Stretch  - 2 x daily - 7 x weekly - 1 sets - 10 reps - 5-10 hold  - Bird Dog  - 2 x daily - 7 x weekly - 2 sets - 10 reps - 3 hold    Issued heel lift to be worn in R shoe with progressive wear program of one hour per day.    ASSESSMENT: Patient with known spinal stenosis on MRI, presents with signs and symptoms consistent with this as he is worse on sustained loaded spinal extension of standing and walking. Patient has 1\" asymmetry with his leg length ( RLE shorter) and significant hi extension weakness at 3/5 on MMT. Patient would continue to benefit from skilled PT to address his hip and core weakness, educate him on self management of his LB and LE pain and weakness to attempt to avoid spinal surgery.     Treatment/Interventions: Education/ Instruction, Manual therapy, Neuromuscular re-education, Self care/ home management, Therapeutic exercises, Therapeutic activities  PT Plan: Skilled PT  PT Frequency: 2 times per week  Duration: 8-10 weeks  Onset Date: 08/17/24  Certification Period Start Date: 11/26/24  Certification Period End Date: 01/31/25  Number of Treatments Authorized: MN  Rehab Potential: Good  Plan of Care Agreement: Patient    Patient " Goals: Learn how to manage symptoms of spinal stenosis, home ex program  PLAN:   Active       PT Problem       Patient will demonstrate hip extension and L abduction strength to 4+/5 on MMT (Progressing)       Start:  09/11/24    Expected End:  01/31/25            Patient will perform 30 sec sit to stand test at norms for age and gender (11-17 reps) (Met)       Start:  09/11/24    Expected End:  11/25/24    Resolved:  12/09/24         Patient will perform step up on curb with good stability and proper technique (Met)       Start:  09/11/24    Expected End:  11/25/24    Resolved:  12/09/24         Patient will perform reciprocal gait on stairs with min to moderate assist of handrails (Met)       Start:  09/11/24    Expected End:  11/25/24    Resolved:  12/09/24         Patient will return to indep exercise program/gym workouts, PSFS at total of 24, average score of 8 on 3 items (Met)       Start:  09/11/24    Expected End:  11/25/24    Resolved:  12/09/24            PT Problem       Patient will be independent with home exercise program to manage his LB and LE pain related to his spinal stenosis       Start:  12/09/24    Expected End:  01/31/25               PT Problem       Patient will score 12% disability on Oswestry and report improvement at 80%       Start:  12/09/24    Expected End:  01/31/25

## 2024-12-12 ENCOUNTER — OFFICE VISIT (OUTPATIENT)
Dept: CARDIOLOGY | Facility: CLINIC | Age: 74
End: 2024-12-12
Payer: MEDICARE

## 2024-12-12 VITALS
WEIGHT: 195 LBS | SYSTOLIC BLOOD PRESSURE: 125 MMHG | HEART RATE: 83 BPM | BODY MASS INDEX: 27.2 KG/M2 | DIASTOLIC BLOOD PRESSURE: 66 MMHG

## 2024-12-12 DIAGNOSIS — I63.9 CEREBRAL INFARCTION, UNSPECIFIED MECHANISM (MULTI): ICD-10-CM

## 2024-12-12 DIAGNOSIS — I35.0 NONRHEUMATIC AORTIC VALVE STENOSIS: ICD-10-CM

## 2024-12-12 DIAGNOSIS — I10 ESSENTIAL HYPERTENSION: ICD-10-CM

## 2024-12-12 DIAGNOSIS — I25.119 CORONARY ARTERY DISEASE INVOLVING NATIVE CORONARY ARTERY OF NATIVE HEART WITH ANGINA PECTORIS: ICD-10-CM

## 2024-12-12 DIAGNOSIS — I71.21 ANEURYSM OF ASCENDING AORTA WITHOUT RUPTURE (CMS-HCC): ICD-10-CM

## 2024-12-12 DIAGNOSIS — I73.9 PAD (PERIPHERAL ARTERY DISEASE) (CMS-HCC): ICD-10-CM

## 2024-12-12 DIAGNOSIS — R42 LIGHTHEADED: Primary | ICD-10-CM

## 2024-12-12 DIAGNOSIS — E78.5 HYPERLIPIDEMIA, UNSPECIFIED HYPERLIPIDEMIA TYPE: ICD-10-CM

## 2024-12-12 DIAGNOSIS — I70.0 ATHEROSCLEROSIS OF AORTA (CMS-HCC): ICD-10-CM

## 2024-12-12 DIAGNOSIS — I49.3 PVC (PREMATURE VENTRICULAR CONTRACTION): ICD-10-CM

## 2024-12-12 DIAGNOSIS — R29.898 WEAKNESS OF LEFT LOWER EXTREMITY: ICD-10-CM

## 2024-12-12 DIAGNOSIS — J44.9 CHRONIC OBSTRUCTIVE PULMONARY DISEASE, UNSPECIFIED COPD TYPE (MULTI): ICD-10-CM

## 2024-12-12 PROCEDURE — 3078F DIAST BP <80 MM HG: CPT | Performed by: NURSE PRACTITIONER

## 2024-12-12 PROCEDURE — 3074F SYST BP LT 130 MM HG: CPT | Performed by: NURSE PRACTITIONER

## 2024-12-12 PROCEDURE — 1036F TOBACCO NON-USER: CPT | Performed by: NURSE PRACTITIONER

## 2024-12-12 PROCEDURE — 1159F MED LIST DOCD IN RCRD: CPT | Performed by: NURSE PRACTITIONER

## 2024-12-12 PROCEDURE — 1160F RVW MEDS BY RX/DR IN RCRD: CPT | Performed by: NURSE PRACTITIONER

## 2024-12-12 PROCEDURE — 99214 OFFICE O/P EST MOD 30 MIN: CPT | Performed by: NURSE PRACTITIONER

## 2024-12-12 PROCEDURE — 1123F ACP DISCUSS/DSCN MKR DOCD: CPT | Performed by: NURSE PRACTITIONER

## 2024-12-12 NOTE — PROGRESS NOTES
"Chief Complaint:   Dizzy/lightheaded      History Of Present Illness:    Juan Jose Roman \"Ferny\" is a 74 y.o. male here for dizziness/ lightheaded. Started 8/17/2024. Presented to hospital with feeling dizzy/lightheaded and near syncopal. Had been feeling off balance prior to presentation. Became dizzy and left lower extremity gave out. Was not syncopal. Cr noted to be mildly elevated upon admit. Given IV hydration. Nothing untoward noted CT of head. Troponin -2.     Doing PT for balance.   Getting epidural for spinal stenosis.     Dizziness is getting better. Denies syncope or recent fall.     Noes BL LE edema. Worse as day progresses, improved in am. States compression socks make edema worse in toes, as toes are not covered with socks.    No sonographic evidence of abdominal aortic aneurysm-4/12/23  Frequent PVC's/RFA 6/10/15 [Severe RCA - EMILY] - 4/24/2015  PVCs [RFA] - 12/20/2019  Cardiac Calcium Score (Total 722:) - 1/2008    MPI (Negative for ischemia. Moderate LV thickness. Mild AVR) - 6/1/2022    Family Hx  Brother aortic dissection.     Allergies:  Ipratropium bromide, Tetracycline, Tetracycline hcl, Valsartan-hydrochlorothiazide, and Plavix [clopidogrel]    Review of Systems  All pertinent systems have been reviewed and are negative except for what is stated in the history of present illness.    All other systems have been reviewed and are negative and noncontributory to this patient's current ailments.     Visit Vitals  /66 (BP Location: Right arm, Patient Position: Sitting, BP Cuff Size: Adult)   Pulse 83   Wt 88.5 kg (195 lb)   BMI 27.20 kg/m²   Smoking Status Former   BSA 2.11 m²     Objective   Vitals reviewed.   Constitutional:       Appearance: Healthy appearance. Not in distress.   Eyes:      Conjunctiva/sclera: Conjunctivae normal.   Neck:      Vascular: No JVR. JVD normal.   Pulmonary:      Effort: Pulmonary effort is normal.      Breath sounds: Normal breath sounds. No wheezing. No rhonchi. " No rales.   Chest:      Chest wall: Not tender to palpatation.   Cardiovascular:      PMI at left midclavicular line. Normal rate. Regular rhythm. Normal S1. Normal S2.       Murmurs: There is no murmur.      No gallop.  No click. No rub.   Edema:     Peripheral edema absent.   Abdominal:      General: Bowel sounds are normal.      Palpations: Abdomen is soft.      Tenderness: There is no abdominal tenderness.   Musculoskeletal: Normal range of motion.         General: No tenderness. Skin:     General: Skin is warm and dry.   Neurological:      General: No focal deficit present.      Mental Status: Alert and oriented to person, place and time.   Psychiatric:         Attention and Perception: Attention normal.         Mood and Affect: Mood normal.       Last Labs:  CBC -  Lab Results   Component Value Date    WBC 8.0 12/04/2024    HGB 15.0 12/04/2024    HCT 47.2 12/04/2024    MCV 89 12/04/2024     12/04/2024       CMP -  Lab Results   Component Value Date    CALCIUM 9.4 08/27/2024    PHOS 4.4 03/31/2021    PROT 6.5 04/04/2023    ALBUMIN 4.2 04/04/2023    AST 20 04/04/2023    ALT 36 12/08/2023    ALKPHOS 53 04/04/2023    BILITOT 0.7 04/04/2023    BUN 26 (H) 08/27/2024    CREATININE 1.58 (H) 08/27/2024       LIPID PANEL -   Lab Results   Component Value Date    CHOL 137 12/08/2023    TRIG 59 12/08/2023    HDL 68.5 12/08/2023    CHHDL 2.0 12/08/2023    LDLF 69 04/04/2023    VLDL 12 12/08/2023    NHDL 69 12/08/2023       RENAL FUNCTION PANEL -   Lab Results   Component Value Date    GLUCOSE 104 (H) 08/27/2024     08/27/2024    K 4.8 08/27/2024     08/27/2024    CO2 27 08/27/2024    ANIONGAP 11 08/27/2024    BUN 26 (H) 08/27/2024    CREATININE 1.58 (H) 08/27/2024    GFRMALE 54 (A) 04/04/2023    CALCIUM 9.4 08/27/2024    PHOS 4.4 03/31/2021    ALBUMIN 4.2 04/04/2023        Lab Results   Component Value Date    HGBA1C 5.7 (H) 06/11/2024         Assessment/Plan   Diagnoses and all orders for this  visit:  Lightheaded   - Improved   - continue current dose of jessica and losartan   - continue compression socks but get some that cover toes   Coronary artery disease involving native coronary artery of native heart with angina pectoris (CMS-HCC)  - denies chest pain and SOB  - continue atorvastatin/asa  PVC (premature ventricular contraction)  - well controlled   - loop recorder, nothing untoward on recent check   Essential hypertension  - home BP readings show well controlled  - today's in office reading is well controlled   Hyperlipidemia, unspecified hyperlipidemia type  - atorvastatin  Cerebral infarction, unspecified mechanism (Multi)  - atorvastatin/asa  - loop recorder  PAD (peripheral artery disease) (CMS-HCC)  - followed with vascular, no intervention warranted   LE weakness/ balance issues/drop foot  - referred to PT  Atherosclerosis of aorta (CMS-HCC)/TAA  - TTE in 6 months   - noted on previous US  - following with vascular   Chronic obstructive pulmonary disease, unspecified COPD type (Multi)  - per pulmonology will be starting new meds   Aortic Stenosis   - mild     Follow up 6 months with TTE    Current Outpatient Medications on File Prior to Visit   Medication Sig Dispense Refill    albuterol 90 mcg/actuation inhaler Inhale 2 puffs every 6 hours if needed.      ammonium lactate (Lac-Hydrin) 12 % lotion APPLY TO THE AFFECTED AREAS TWICE DAILY AFTER SHOWER OR BATH      Anoro Ellipta 62.5-25 mcg/actuation blister with device Inhale 1 puff once daily.      aspirin 81 mg capsule Take 1 tablet by mouth once daily.      atorvastatin (Lipitor) 40 mg tablet TAKE 1 TABLET BY MOUTH EVERY DAY 90 tablet 3    beneprotein Take by mouth once daily.      buPROPion (Wellbutrin) 100 mg tablet Take 1 tablet (100 mg) by mouth once daily in the morning. Take before meals.      calcium carbonate (Tums) 200 mg calcium chewable tablet Chew 1 tablet (500 mg) once daily.      cholecalciferol (Vitamin D-3) 125 MCG (5000 UT)  capsule Take 1 capsule (125 mcg) by mouth once daily.      cholestyramine light (Prevalite) 4 gram packet Take by mouth.      clonazePAM (KlonoPIN) 0.5 mg tablet Take 1 tablet (0.5 mg) by mouth every 12 hours if needed.      famotidine (Pepcid) 40 mg tablet TAKE 1 TABLET (40 MG) BY MOUTH ONCE EVERY 24 HOURS. 90 tablet 3    fluticasone (Flonase) 50 mcg/actuation nasal spray Administer 1 spray into each nostril once daily.      ipratropium (Atrovent) 21 mcg (0.03 %) nasal spray USE 2 APPLICATIONS IN INTO EACH NOSTRIL TWICE A DAY AS NEEDED      levothyroxine (Synthroid, Levoxyl) 88 mcg tablet Take 1 tablet (88 mcg) by mouth once daily. 90 tablet 3    losartan (Cozaar) 50 mg tablet TAKE 1 TABLET BY MOUTH TWICE A  tablet 2    melatonin 5 mg tablet,chewable Chew.      spironolactone (Aldactone) 25 mg tablet Take 0.5 tablets (12.5 mg) by mouth once daily. 45 tablet 3    tadalafil (Cialis) 5 mg tablet Take 1 tablet (5 mg) by mouth once daily. 30 tablet 3     No current facility-administered medications on file prior to visit.         Exclusive of any other services or procedures performed, I, Caro ACOSTA, spent 36 minutes in duration for this visit today.  This time consisted of chart review, obtaining history, and/or performing the exam as documented above, as well as, documenting the clinical information for the encounter in the electronic record, discussing treatment options, plans, and/or goals with patient, family, and/or caregiver, refilling medications, updating the electronic record, ordering medicines, lab work, imaging, referrals, and/or procedures as documented above and communicating with other [facility] professionals. I have discussed the results of laboratory, radiology, and cardiology studies with the patient and their family/caregiver.       I reviewed ortho surgery, pain management and pcp notes.

## 2024-12-13 ENCOUNTER — APPOINTMENT (OUTPATIENT)
Dept: PRIMARY CARE | Facility: CLINIC | Age: 74
End: 2024-12-13
Payer: MEDICARE

## 2024-12-13 VITALS
DIASTOLIC BLOOD PRESSURE: 76 MMHG | HEIGHT: 71 IN | BODY MASS INDEX: 27.16 KG/M2 | SYSTOLIC BLOOD PRESSURE: 120 MMHG | WEIGHT: 194 LBS

## 2024-12-13 DIAGNOSIS — E03.9 HYPOTHYROIDISM, UNSPECIFIED TYPE: ICD-10-CM

## 2024-12-13 DIAGNOSIS — I10 ESSENTIAL HYPERTENSION: ICD-10-CM

## 2024-12-13 DIAGNOSIS — N18.31 STAGE 3A CHRONIC KIDNEY DISEASE (MULTI): Primary | ICD-10-CM

## 2024-12-13 DIAGNOSIS — R97.20 ABNORMAL PSA: ICD-10-CM

## 2024-12-13 DIAGNOSIS — R73.09 ELEVATED GLUCOSE: ICD-10-CM

## 2024-12-13 DIAGNOSIS — E78.5 HYPERLIPIDEMIA, UNSPECIFIED HYPERLIPIDEMIA TYPE: ICD-10-CM

## 2024-12-13 PROCEDURE — G0439 PPPS, SUBSEQ VISIT: HCPCS | Performed by: INTERNAL MEDICINE

## 2024-12-13 PROCEDURE — 3078F DIAST BP <80 MM HG: CPT | Performed by: INTERNAL MEDICINE

## 2024-12-13 PROCEDURE — 1036F TOBACCO NON-USER: CPT | Performed by: INTERNAL MEDICINE

## 2024-12-13 PROCEDURE — 99214 OFFICE O/P EST MOD 30 MIN: CPT | Performed by: INTERNAL MEDICINE

## 2024-12-13 PROCEDURE — 1170F FXNL STATUS ASSESSED: CPT | Performed by: INTERNAL MEDICINE

## 2024-12-13 PROCEDURE — 1123F ACP DISCUSS/DSCN MKR DOCD: CPT | Performed by: INTERNAL MEDICINE

## 2024-12-13 PROCEDURE — 1159F MED LIST DOCD IN RCRD: CPT | Performed by: INTERNAL MEDICINE

## 2024-12-13 PROCEDURE — 3074F SYST BP LT 130 MM HG: CPT | Performed by: INTERNAL MEDICINE

## 2024-12-13 ASSESSMENT — ACTIVITIES OF DAILY LIVING (ADL)
MANAGING_FINANCES: INDEPENDENT
GROCERY_SHOPPING: INDEPENDENT
DOING_HOUSEWORK: INDEPENDENT
DRESSING: INDEPENDENT
BATHING: INDEPENDENT
TAKING_MEDICATION: INDEPENDENT

## 2024-12-13 ASSESSMENT — PATIENT HEALTH QUESTIONNAIRE - PHQ9
SUM OF ALL RESPONSES TO PHQ9 QUESTIONS 1 AND 2: 0
SUM OF ALL RESPONSES TO PHQ9 QUESTIONS 1 AND 2: 0
2. FEELING DOWN, DEPRESSED OR HOPELESS: NOT AT ALL
2. FEELING DOWN, DEPRESSED OR HOPELESS: NOT AT ALL
1. LITTLE INTEREST OR PLEASURE IN DOING THINGS: NOT AT ALL
1. LITTLE INTEREST OR PLEASURE IN DOING THINGS: NOT AT ALL

## 2024-12-13 NOTE — PROGRESS NOTES
"Subjective   Reason for Visit: Juan Jose Roman is an 74 y.o. male here for a Medicare Wellness visit.               HPI    Overall well  Note 8/30/24 reviewed  S/p eval w/ neuro, spine surgery.  --> felt to be spinal stenosis.   S/p epidural yesterday and PT pending.     #1 COPD-follows with pulmonology.  Clinically doing well.    #2 CAD- f/u  dr Love/Caro Alejandra-note from yesterday reviewed.  #3 CVA- remote.  No clear etology.  Neg cards eval for afib.  f/u  neuro/cards  #4 lipids-on treatment without difficulty.  #5 depression/anxiety-overall mood has been good  #6 insomnia-relatively stable with melatonin.  #7 IFBS-tries to keep sugar and carbs limited.  #8 CKD3- retest 10/23.  No NSAIDS reviewed.   #9 subclinical hypothyroid- retest 10/23  #10 hypertension-no headache chest pain or dizziness.  #11 GERD-controlled.  No dysphagia.    Patient Care Team:  Kellee Chowdhury MD as PCP - General (Internal Medicine)  Kellee Chowdhury MD as PCP - [facility]  Pastora Flores LPN as Care Manager (Case Management)     Review of Systems    Objective   Vitals:  /76   Ht 1.791 m (5' 10.5\")   Wt 88 kg (194 lb)   BMI 27.44 kg/m²       Physical Exam  Constitutional:       Appearance: Normal appearance.   Cardiovascular:      Rate and Rhythm: Normal rate and regular rhythm.      Pulses: Normal pulses.      Heart sounds: No murmur heard.     No gallop.   Pulmonary:      Effort: Pulmonary effort is normal. No respiratory distress.      Breath sounds: Normal breath sounds. No wheezing, rhonchi or rales.   Neurological:      Mental Status: He is alert.   Psychiatric:         Mood and Affect: Mood normal.         Behavior: Behavior normal.         Thought Content: Thought content normal.         Judgment: Judgment normal.           Lab Results   Component Value Date    WBC 8.0 12/04/2024    HGB 15.0 12/04/2024    HCT 47.2 12/04/2024     12/04/2024    CHOL 137 12/08/2023    TRIG 59 12/08/2023    HDL " 68.5 12/08/2023    ALT 36 12/08/2023    AST 20 04/04/2023     08/27/2024    K 4.8 08/27/2024     08/27/2024    CREATININE 1.58 (H) 08/27/2024    BUN 26 (H) 08/27/2024    CO2 27 08/27/2024    TSH 2.83 08/27/2024    PSA 2.37 08/27/2024    INR 1.1 12/11/2019    HGBA1C 5.7 (H) 06/11/2024       Assessment & Plan  Stage 3a chronic kidney disease (Multi)    Orders:    Basic Metabolic Panel; Future    Elevated glucose    Orders:    Hemoglobin A1C; Future    Hypothyroidism, unspecified type    Orders:    TSH with reflex to Free T4 if abnormal; Future    Hyperlipidemia, unspecified hyperlipidemia type    Orders:    Alanine Aminotransferase; Future    Lipid Panel; Future    Essential hypertension         Abnormal PSA                   #1 COPD-clinically stable.  I do not have details.  Continue inhalers and as needed prednisone per pulmonology.  F/u  dr Wright.  Having CT's through there office.   #2 CAD-clinically stable.  F/u  dr Love/Caro Alejandra.   #3 CVA- remote.  No clear etology.  Neg cards eval for afib.  f/u  neuro/cards  #4 lipids- labs, continue treatment   #5 depression/anxiety- f/u psychiatry  #6 insomnia- f/u  w/ psychiatry. Con't melatonin.      #7 IFBS-reviewed.  Stable. Low sugar and carbohydrate diet with exercise reviewed.  Check A1c   #8 CKD3-stable by 1 year.  Check urinary microalbumin.  No NSAIDs reviewed   #9 subclinical hypothyroid- retest 6 to 12 months   #10 hypertension-good.  Continue treatment  #11 GERD- s/p remote EGD (2-3 yrs ptv???).  Will increase pepcid bid x 2 weeks, resume every day.  GI f/up pending  #12 seasonal allergic rhinitis-significant.  Continue current treatment.  #13 hip DJD- f/u  ortho at [facility]  #14 bph/ED- on Cialis.  f/u   [facility]  Patient was identified as a fall risk. Risk prevention instructions provided.  f/u  6 mths

## 2024-12-17 DIAGNOSIS — E03.9 HYPOTHYROIDISM, UNSPECIFIED TYPE: ICD-10-CM

## 2024-12-18 ENCOUNTER — TREATMENT (OUTPATIENT)
Dept: PHYSICAL THERAPY | Facility: CLINIC | Age: 74
End: 2024-12-18
Payer: MEDICARE

## 2024-12-18 ENCOUNTER — LAB (OUTPATIENT)
Dept: LAB | Facility: LAB | Age: 74
End: 2024-12-18
Payer: MEDICARE

## 2024-12-18 DIAGNOSIS — N18.31 STAGE 3A CHRONIC KIDNEY DISEASE (MULTI): ICD-10-CM

## 2024-12-18 DIAGNOSIS — R26.89 BALANCE DISORDER: ICD-10-CM

## 2024-12-18 DIAGNOSIS — E03.9 HYPOTHYROIDISM, UNSPECIFIED TYPE: ICD-10-CM

## 2024-12-18 DIAGNOSIS — M21.372 LEFT FOOT DROP: ICD-10-CM

## 2024-12-18 DIAGNOSIS — R73.09 ELEVATED GLUCOSE: ICD-10-CM

## 2024-12-18 DIAGNOSIS — E78.5 HYPERLIPIDEMIA, UNSPECIFIED HYPERLIPIDEMIA TYPE: ICD-10-CM

## 2024-12-18 LAB
ALT SERPL W P-5'-P-CCNC: 21 U/L (ref 10–52)
ANION GAP SERPL CALC-SCNC: 12 MMOL/L (ref 10–20)
BUN SERPL-MCNC: 28 MG/DL (ref 6–23)
CALCIUM SERPL-MCNC: 9.1 MG/DL (ref 8.6–10.3)
CHLORIDE SERPL-SCNC: 106 MMOL/L (ref 98–107)
CHOLEST SERPL-MCNC: 141 MG/DL (ref 0–199)
CHOLESTEROL/HDL RATIO: 2.4
CO2 SERPL-SCNC: 25 MMOL/L (ref 21–32)
CREAT SERPL-MCNC: 1.23 MG/DL (ref 0.5–1.3)
EGFRCR SERPLBLD CKD-EPI 2021: 61 ML/MIN/1.73M*2
EST. AVERAGE GLUCOSE BLD GHB EST-MCNC: 111 MG/DL
GLUCOSE SERPL-MCNC: 98 MG/DL (ref 74–99)
HBA1C MFR BLD: 5.5 %
HDLC SERPL-MCNC: 58.8 MG/DL
LDLC SERPL CALC-MCNC: 64 MG/DL
NON HDL CHOLESTEROL: 82 MG/DL (ref 0–149)
POTASSIUM SERPL-SCNC: 4.3 MMOL/L (ref 3.5–5.3)
SODIUM SERPL-SCNC: 139 MMOL/L (ref 136–145)
T4 FREE SERPL-MCNC: 0.95 NG/DL (ref 0.61–1.12)
TRIGL SERPL-MCNC: 91 MG/DL (ref 0–149)
TSH SERPL-ACNC: 5.03 MIU/L (ref 0.44–3.98)
VLDL: 18 MG/DL (ref 0–40)

## 2024-12-18 PROCEDURE — 36415 COLL VENOUS BLD VENIPUNCTURE: CPT

## 2024-12-18 PROCEDURE — 84443 ASSAY THYROID STIM HORMONE: CPT

## 2024-12-18 PROCEDURE — 83036 HEMOGLOBIN GLYCOSYLATED A1C: CPT

## 2024-12-18 PROCEDURE — 84460 ALANINE AMINO (ALT) (SGPT): CPT

## 2024-12-18 PROCEDURE — 84439 ASSAY OF FREE THYROXINE: CPT

## 2024-12-18 PROCEDURE — 80048 BASIC METABOLIC PNL TOTAL CA: CPT

## 2024-12-18 PROCEDURE — 97110 THERAPEUTIC EXERCISES: CPT | Mod: GP | Performed by: PHYSICAL THERAPIST

## 2024-12-18 PROCEDURE — 97035 APP MDLTY 1+ULTRASOUND EA 15: CPT | Mod: GP | Performed by: PHYSICAL THERAPIST

## 2024-12-18 PROCEDURE — 80061 LIPID PANEL: CPT

## 2024-12-18 RX ORDER — LEVOTHYROXINE SODIUM 88 UG/1
88 TABLET ORAL DAILY
Qty: 90 TABLET | Refills: 3 | Status: SHIPPED | OUTPATIENT
Start: 2024-12-18

## 2024-12-18 NOTE — PROGRESS NOTES
"  Physical Therapy Treatment    Patient Name: Juan Jose Roman  MRN: [id number]  Today's Date: 12/18/2024  Visit 8 /[place]  FIONA Youssef-DICK Watson  Initial Cert date: 09/11/24- 11/25/24  Recert date: 11/26/24- 01/31/25  Time Calculation  Start Time: 0835  Stop Time: 0925  Time Calculation (min): 50 min  1. Balance disorder  Follow Up In Physical Therapy      2. Left foot drop  Follow Up In Physical Therapy        Problem List Items Addressed This Visit             ICD-10-CM    Balance disorder R26.89    Left foot drop M21.372        PT Therapeutic Procedures Time Entry  Therapeutic Exercise Time Entry: 30  PT Modalities Time Entry  Ultrasound Time Entry: 8                PRECAUTIONS: HTN, CVA, lung disease, vascular disease, neurocardiogenic syncope, fall risk   Fall Risk: Low    SUBJECTIVE:  Patient reports increased pain in his R LB. May be related to wearing the heel lift too much. States that he had a spinal epidural injection approximately one week ago, with no significant relief. LE weakness and achiness seem to be getting better. The treadmill has been easier since the epidural.  Pain level: 6-7/10  Compliant with HEP? Partially    OBJECTIVE:  +TTP at R SIJ, note lidoderm patches in this area, removed with mild erythema beneath    TREATMENT:  - Therex:  Seated lumbar flexion hold 10 sec x 5 reps  Seated hamstring stretch x 1 min R/L  Bird dog x 5 reps ea  Posterior pelvic tilt on all 4's 5 reps x 5 sec hold  \"Down dog\" Lumbar stretch with manual O/P (sacral depression) 3 x 15 sec    - Modalities:  Pulsed US @ 50% to R SI area at 1.5 w/cm2 x 8 min in prone over pillow, then ice to R LB and SI for 10 minutes in same position    ASSESSMENT:   Patient notes relief post US. Reports decreased LBP to 4-5/10 post treatment.     EDUCATION: Advised to take one layer off of his heel lift and to slowly work back to wearing this one hour daily, continue to focus on flexion based exercise program    PLAN: " Continue at 1 x week, progress loaded core stab work as tolerated. Consider seated Swiss ball exercises.

## 2024-12-19 ENCOUNTER — TELEPHONE (OUTPATIENT)
Dept: CARDIOLOGY | Facility: CLINIC | Age: 74
End: 2024-12-19
Payer: MEDICARE

## 2024-12-19 ENCOUNTER — DOCUMENTATION (OUTPATIENT)
Dept: CARDIOLOGY | Facility: CLINIC | Age: 74
End: 2024-12-19
Payer: MEDICARE

## 2024-12-19 NOTE — TELEPHONE ENCOUNTER
Pt LM asking for something in writing from you to get his clinical massage. They won't work on him without written consent

## 2025-01-03 ENCOUNTER — APPOINTMENT (OUTPATIENT)
Dept: PHYSICAL THERAPY | Facility: CLINIC | Age: 75
End: 2025-01-03
Payer: MEDICARE

## 2025-01-10 ENCOUNTER — TREATMENT (OUTPATIENT)
Dept: PHYSICAL THERAPY | Facility: CLINIC | Age: 75
End: 2025-01-10
Payer: MEDICARE

## 2025-01-10 DIAGNOSIS — M21.372 LEFT FOOT DROP: ICD-10-CM

## 2025-01-10 DIAGNOSIS — R26.89 BALANCE DISORDER: ICD-10-CM

## 2025-01-10 PROCEDURE — 97014 ELECTRIC STIMULATION THERAPY: CPT | Mod: GP | Performed by: PHYSICAL THERAPIST

## 2025-01-10 PROCEDURE — 97035 APP MDLTY 1+ULTRASOUND EA 15: CPT | Mod: GP | Performed by: PHYSICAL THERAPIST

## 2025-01-10 PROCEDURE — 97110 THERAPEUTIC EXERCISES: CPT | Mod: GP | Performed by: PHYSICAL THERAPIST

## 2025-01-10 NOTE — PROGRESS NOTES
"  Physical Therapy Treatment    Patient Name: Juan Jose Roman  MRN: [id number]  Today's Date: 1/10/2025  Visit 9 /MN  Dr. Juan Jose Watson  Initial Cert date: 09/11/24- 11/25/24  Recert date: 11/26/24- 01/31/25  Time Calculation  Start Time: 0905  Stop Time: 0955  Time Calculation (min): 50 min  1. Balance disorder  Follow Up In Physical Therapy      2. Left foot drop  Follow Up In Physical Therapy        Problem List Items Addressed This Visit             ICD-10-CM    Balance disorder R26.89    Left foot drop M21.372        PT Therapeutic Procedures Time Entry  Therapeutic Exercise Time Entry: 15  PT Modalities Time Entry  E-Stim (Unattended) Time Entry: 12  Ultrasound Time Entry: 8                PRECAUTIONS: HTN, CVA, lung disease, vascular disease, neurocardiogenic syncope, fall risk   Fall Risk: Low    SUBJECTIVE:  Patient states he has had an epidural and an SI jt injections since he was here. Also did some clinical massage yesterday.  Dr's feel he no longer has drop foot issues. Wearing SI belt seems to help. Has stopped wearing the heel lift for now.  Pain level: 0/10 currently in his SI jt  Compliant with HEP? Yes    OBJECTIVE: Note areas around R SI jt where injections were given, slow and deliberate with bed mobility and transfers    TREATMENT:  - Therex:  Seated lumbar flexion hold 10 sec x 10 reps  Seated hamstring stretch x 1 min R/L  Bird dog x 8 reps ea  Posterior pelvic tilt on all 4's 10 reps x 5 sec hold  \"Down dog\" Lumbar stretch with manual O/P (sacral depression) 3 x 15 sec    - Modalities:  Pulsed US @ 50% to R SI area at 1.5 w/cm2 x 8 min in prone over pillow, then Empi Select estim to R SI for 10 minutes in same position     ASSESSMENT: Patient with good tolerance to program today.      PLAN: continue x 1 visit with reassessment. Consider progression to seated core stab on PB.            "

## 2025-01-14 ENCOUNTER — TREATMENT (OUTPATIENT)
Dept: PHYSICAL THERAPY | Facility: CLINIC | Age: 75
End: 2025-01-14
Payer: MEDICARE

## 2025-01-14 DIAGNOSIS — R26.89 BALANCE DISORDER: ICD-10-CM

## 2025-01-14 DIAGNOSIS — M21.372 LEFT FOOT DROP: ICD-10-CM

## 2025-01-14 PROCEDURE — 97110 THERAPEUTIC EXERCISES: CPT | Mod: GP | Performed by: PHYSICAL THERAPIST

## 2025-01-14 NOTE — PROGRESS NOTES
Physical Therapy Treatment and Discharge    Patient Name: Juan Jose Roman  MRN: [id number]  Today's Date: 1/14/2025  Visit10 /MN  Dr. Juan Jose Watson  Initial Cert date: 09/11/24- 11/25/24  Recert date: 11/26/24- 01/31/25  Time Calculation  Start Time: 1458  Stop Time: 1535  Time Calculation (min): 37 min  1. Balance disorder  Follow Up In Physical Therapy      2. Left foot drop  Follow Up In Physical Therapy        Problem List Items Addressed This Visit             ICD-10-CM    Balance disorder R26.89    Left foot drop M21.372                            PRECAUTIONS: HTN, CVA, lung disease, vascular disease, neurocardiogenic syncope, fall risk   Fall Risk: Low    SUBJECTIVE:  SI jt remains problematic but better than it was.Has sharp intermittent stabs of pain, last one occurred while standing at a restaurant. Scheduled to leave for [place] at the end of the week. Reports he does feel better for a couple of hours after he has done his exercises.  Pain level: 0/10 up to 2-3/10 with movement.  Compliant with HEP? Yes    OBJECTIVE:  Strength MMT hip abduction 5/5 B  Hip extension 4/5 B  Stairs- able to ascend and descend with light assist of handrail and normal speed.  Gait is stable with good speed and = stance time R vs L  Outcome Measure:  Other Measures  Oswestry Disablity Index (ZEV): 13/50 or 26%  Other Outcome Measures: PSFS total 29, average 9.6 (transfer sit to stand 10, step up on curb 10, descend step 9) vs total 24 and average 8 on 12/09/24    TREATMENT:  - Therex: RE-administer Oswestry and PSFS     - Modalities:  Declines US today    ASSESSMENT:    Oswestry score has increased due to recent SI exacerbation after adding heel lift for leg length discrepancy too soon. Otherwise, PSFS had improved even further since 12/09/24. Patient has good understanding of his home exercises and self management at this time.    EDUCATION: discussion of topical analgesics for localized pain and inflammation at SI  region    PLAN: DC PT  Active       PT Problem       Patient will be independent with home exercise program to manage his LB and LE pain related to his spinal stenosis       Start:  12/09/24    Expected End:  01/31/25              Resolved       PT Problem       Patient will demonstrate hip extension and L abduction strength to 4+/5 on MMT (Met)       Start:  09/11/24    Expected End:  01/31/25    Resolved:  01/14/25         Patient will perform 30 sec sit to stand test at norms for age and gender (11-17 reps) (Met)       Start:  09/11/24    Expected End:  11/25/24    Resolved:  12/09/24         Patient will perform step up on curb with good stability and proper technique (Met)       Start:  09/11/24    Expected End:  11/25/24    Resolved:  12/09/24         Patient will perform reciprocal gait on stairs with min to moderate assist of handrails (Met)       Start:  09/11/24    Expected End:  11/25/24    Resolved:  12/09/24         Patient will return to indep exercise program/gym workouts, PSFS at total of 24, average score of 8 on 3 items (Met)       Start:  09/11/24    Expected End:  11/25/24    Resolved:  12/09/24            PT Problem       Patient will score 12% disability on Oswestry and report improvement at 80% (Adequate for Discharge)       Start:  12/09/24    Expected End:  01/31/25    Resolved:  01/14/25

## 2025-01-27 ENCOUNTER — HOSPITAL ENCOUNTER (OUTPATIENT)
Dept: CARDIOLOGY | Facility: CLINIC | Age: 75
Discharge: HOME | End: 2025-01-27
Payer: MEDICARE

## 2025-01-27 DIAGNOSIS — I63.9 CEREBRAL INFARCTION, UNSPECIFIED: ICD-10-CM

## 2025-01-27 PROCEDURE — 93298 REM INTERROG DEV EVAL SCRMS: CPT

## 2025-02-17 ENCOUNTER — HOSPITAL ENCOUNTER (OUTPATIENT)
Dept: CARDIOLOGY | Facility: CLINIC | Age: 75
Discharge: HOME | End: 2025-02-17
Payer: MEDICARE

## 2025-02-17 DIAGNOSIS — I63.9 CEREBRAL INFARCTION, UNSPECIFIED: ICD-10-CM

## 2025-03-31 DIAGNOSIS — I10 BENIGN ESSENTIAL HYPERTENSION: ICD-10-CM

## 2025-03-31 RX ORDER — LOSARTAN POTASSIUM 50 MG/1
50 TABLET ORAL 2 TIMES DAILY
Qty: 180 TABLET | Refills: 2 | Status: SHIPPED | OUTPATIENT
Start: 2025-03-31

## 2025-05-21 ENCOUNTER — TELEPHONE (OUTPATIENT)
Dept: CARDIOLOGY | Facility: CLINIC | Age: 75
End: 2025-05-21
Payer: MEDICARE

## 2025-05-21 NOTE — TELEPHONE ENCOUNTER
Pt sees neuro and they are advising to increase his atorvastatin to 80mg, but pt wants an appointment with Caro to discuss everything

## 2025-05-28 ENCOUNTER — OFFICE VISIT (OUTPATIENT)
Dept: SURGERY | Facility: HOSPITAL | Age: 75
End: 2025-05-28
Payer: MEDICARE

## 2025-05-28 ENCOUNTER — PREP FOR PROCEDURE (OUTPATIENT)
Dept: CARDIOTHORACIC SURGERY | Facility: HOSPITAL | Age: 75
End: 2025-05-28

## 2025-05-28 VITALS
BODY MASS INDEX: 28.39 KG/M2 | TEMPERATURE: 97 F | SYSTOLIC BLOOD PRESSURE: 121 MMHG | OXYGEN SATURATION: 95 % | DIASTOLIC BLOOD PRESSURE: 59 MMHG | HEIGHT: 69 IN | WEIGHT: 191.7 LBS | RESPIRATION RATE: 18 BRPM | HEART RATE: 85 BPM

## 2025-05-28 DIAGNOSIS — R91.1 LUNG NODULE: Primary | ICD-10-CM

## 2025-05-28 PROCEDURE — 3078F DIAST BP <80 MM HG: CPT | Performed by: THORACIC SURGERY (CARDIOTHORACIC VASCULAR SURGERY)

## 2025-05-28 PROCEDURE — 1036F TOBACCO NON-USER: CPT | Performed by: THORACIC SURGERY (CARDIOTHORACIC VASCULAR SURGERY)

## 2025-05-28 PROCEDURE — 99205 OFFICE O/P NEW HI 60 MIN: CPT | Performed by: THORACIC SURGERY (CARDIOTHORACIC VASCULAR SURGERY)

## 2025-05-28 PROCEDURE — 1126F AMNT PAIN NOTED NONE PRSNT: CPT | Performed by: THORACIC SURGERY (CARDIOTHORACIC VASCULAR SURGERY)

## 2025-05-28 PROCEDURE — 3074F SYST BP LT 130 MM HG: CPT | Performed by: THORACIC SURGERY (CARDIOTHORACIC VASCULAR SURGERY)

## 2025-05-28 PROCEDURE — 1123F ACP DISCUSS/DSCN MKR DOCD: CPT | Performed by: THORACIC SURGERY (CARDIOTHORACIC VASCULAR SURGERY)

## 2025-05-28 PROCEDURE — 99215 OFFICE O/P EST HI 40 MIN: CPT | Performed by: THORACIC SURGERY (CARDIOTHORACIC VASCULAR SURGERY)

## 2025-05-28 RX ORDER — PSYLLIUM HUSK 0.4 G
CAPSULE ORAL
COMMUNITY

## 2025-05-28 RX ORDER — ERGOCALCIFEROL 1.25 MG/1
1.25 CAPSULE ORAL WEEKLY
COMMUNITY

## 2025-05-28 ASSESSMENT — PAIN SCALES - GENERAL: PAINLEVEL_OUTOF10: 0-NO PAIN

## 2025-05-28 NOTE — H&P (VIEW-ONLY)
"HPI:   Juan Jose Roman \"Ferny\" is a 75 y.o.male referred with a left upper lobe medial lung nodule.  He has a 60-pack-year smoking history and quit in 2021.  He has numerous other medical problems including abdominal aortic aneurysm, prior CVA in January 2021, coronary artery disease treated by stent, renal insufficiency, moderately severe COPD, hypertension, ventricular tachycardia PVCs treated by 2 ablation procedures.  More recently has been diagnosed with spinal stenosis and has leg weakness from this and with neurocardiogenic syncope.  He has evening oxygen as well.  He is on aspirin 81 mg a day.    Medical History[1]     Current Medications[2]    PSHx:  SHx: c ex smoker .6 ETOH per week, or illicit drugs   FMHx: negative for history of thoracic cancer     ROS  General: negative for fever, chills, weight loss, night sweat  Head: negative for severe headache, vision change, blurred vision,   CV: negative for chest pain, dizziness, lightheadedness   Pulm: negative for shortness of breath, dyspnea on exertion, hemoptysis  GI: negative for diarrhea, constipation, abdominal pain, nausea or vomiting, BRBPR  : negative for dysuria, hematuria, incontinence  Skin: negative for rash  Heme: negative for blood thinner, bleeding disorder or clotting disorder  Endo: negative for heat or cold intolerance, weight gain or weight loss  MSK: negative for rash, edema, weakness    PHYSICAL EXAM  Constitution: well-developed well-nourished in no acute distress  HEENT: NCAT, moist mucosal membrane, neck supple, no crepitus, sclera anicteric  Lymph nodes: no cervical or supraclavicular lymphadenopathy  Cardiac: RRR, normal S1, S2, no mrg  Pulmonary: normal air movement, CTAP, no wcr  Abdomen: soft, non-distended, non-tender, no rigidity, guarding or rebound tenderness, no splenohepatomegaly  Neuro: AOx3, CNII-XII grossly normal  Ext: warm, dry, no edema noted  Skin: dry, clean and intact  Psych: mood and affect wnl    Results    I " reviewed the PFT from May 19, 2025. It showed FEV1 51 percent predicted and DLCO 55 percent predicted   I reviewed the CT scan from April 23, 2025 and compared to one 11/26/2024 and 2/2020. It showed slight enlargement of the nodule from 2024 and significant enlargement from 2020  I reviewed the PET scan from 5/24/2024. It showed slight activity in the nodule.  Some diffuse bilateral adrenal uptake.  A single focus of uptake in the right posterior seventh rib adjacent to the spine.  I reviewed the ECHO from 6/25/2024. It showed EF of 76, RV function is normal, RVSP 30 mmHg    Assessment and Plan:    This is a 75-year-old male ex heavy smoker with multiple medical problems.  Currently his activity is limited by his leg weakness and neurocardiogenic syncope.  I have recommended a left VATS wedge resection.  The tumor is adjacent to the mediastinum and adjacent to the aortic arch and may involve both.  I think there is a significant chance that this is a cancer and alternative biopsies by needle or navigational bronchoscopy would be difficult.  I explained alternatives of observation.  All of his questions were answered and he consents to left thoracoscopic exploration possible wedge resection and unlikely lobectomy.  He understands there is some risk of injury of the phrenic nerve which controls the left diaphragm as this tumor is near that area and also near the nerve to the left vocal cord as well.    I have recommended a VATS wedge resection and a small chance of lobectomy.  I mentioned a 1% risk of mortality, and additional risks of bleeding requiring a blood transfusion, infection, blood clots, irregular heartbeats and slow healing of the lung.  I mentioned a chance that a larger amount of lung would need to be removed in order to remove all of the cancer.  I explained alternatives of chemotherapy and/or focused radiation.  All questions were answered and the patient consents to partial lung resection.          Shailesh Enamorado MD  Chief Division of Thoracic and Esophageal Surgery    [facility]   Co-Director, Thoracic Oncology Program    [facility]  Professor of Surgery    [facility] [employer]  Office phone: [phone]  Fax: [phone]           [1]   Past Medical History:  Diagnosis Date    Abnormal results of pulmonary function studies     Abnormal PFT    Acute bronchitis due to other specified organisms 09/20/2018    Acute bacterial bronchitis    Anxiety disorder, unspecified     Anxiety    Diarrhea 06/21/2023    Disorder of arteries and arterioles, unspecified     Carotid arterial disease    Diverticulitis of intestine, part unspecified, without perforation or abscess without bleeding     Diverticulitis    Dizziness and giddiness 04/13/2015    Episodic lightheadedness    Essential (primary) hypertension 11/30/2013    Hypertension    Hypokalemia 06/09/2016    Diuretic-induced hypokalemia    Obstructive sleep apnea (adult) (pediatric)     RAHUL (obstructive sleep apnea)    On home oxygen therapy 05/07/2025    at night    Other specified abnormal findings of blood chemistry 06/09/2017    Elevated serum creatinine    Personal history of colonic polyps     History of colonic polyps    Personal history of other diseases of the circulatory system     History of coronary artery disease    Personal history of other diseases of the circulatory system     Personal history of coronary atherosclerosis    Personal history of other diseases of the digestive system 12/13/2022    History of gastroesophageal reflux (GERD)    Personal history of other diseases of the respiratory system     History of chronic obstructive lung disease    Personal history of other diseases of the respiratory system 02/26/2016    History of acute bronchitis with bronchospasm    Personal history of other endocrine, nutritional and metabolic disease     History of  hypothyroidism   [2]   Current Outpatient Medications:     aspirin 81 mg capsule, Take 1 tablet by mouth once daily., Disp: , Rfl:     atorvastatin (Lipitor) 40 mg tablet, TAKE 1 TABLET BY MOUTH EVERY DAY, Disp: 90 tablet, Rfl: 3    famotidine (Pepcid) 40 mg tablet, TAKE 1 TABLET (40 MG) BY MOUTH ONCE EVERY 24 HOURS., Disp: 90 tablet, Rfl: 3    levothyroxine (Synthroid, Levoxyl) 88 mcg tablet, TAKE 1 TABLET BY MOUTH ONCE DAILY., Disp: 90 tablet, Rfl: 3    losartan (Cozaar) 50 mg tablet, TAKE 1 TABLET BY MOUTH TWICE A DAY, Disp: 180 tablet, Rfl: 2    spironolactone (Aldactone) 25 mg tablet, Take 0.5 tablets (12.5 mg) by mouth once daily., Disp: 45 tablet, Rfl: 3    tadalafil (Cialis) 5 mg tablet, Take 1 tablet (5 mg) by mouth once daily., Disp: 30 tablet, Rfl: 3    albuterol 90 mcg/actuation inhaler, Inhale 2 puffs every 6 hours if needed., Disp: , Rfl:     ammonium lactate (Lac-Hydrin) 12 % lotion, APPLY TO THE AFFECTED AREAS TWICE DAILY AFTER SHOWER OR BATH, Disp: , Rfl:     Anoro Ellipta 62.5-25 mcg/actuation blister with device, Inhale 1 puff once daily., Disp: , Rfl:     calcium carbonate (Tums) 200 mg calcium chewable tablet, Chew 1 tablet (500 mg) once daily., Disp: , Rfl:     cholecalciferol (Vitamin D-3) 125 MCG (5000 UT) capsule, Take 1 capsule (125 mcg) by mouth once daily., Disp: , Rfl:     cholestyramine light (Prevalite) 4 gram packet, Take by mouth., Disp: , Rfl:     clonazePAM (KlonoPIN) 0.5 mg tablet, Take 1 tablet (0.5 mg) by mouth every 12 hours if needed., Disp: , Rfl:     ergocalciferol (Vitamin D-2) 1250 mcg (50,000 units) capsule, Take 1 capsule (1.25 mg) by mouth 1 (one) time per week., Disp: , Rfl:     fluticasone (Flonase) 50 mcg/actuation nasal spray, Administer 1 spray into each nostril once daily., Disp: , Rfl:     melatonin 5 mg tablet,chewable, Chew., Disp: , Rfl:     turmeric/turmeric ext/pepr ext (turmeric-turmeric ext-pepper) 500-5 mg capsule, Take by mouth., Disp: , Rfl:

## 2025-05-28 NOTE — PROGRESS NOTES
"HPI:   Juan Jose Roman \"Ferny\" is a 75 y.o.male referred with a left upper lobe medial lung nodule.  He has a 60-pack-year smoking history and quit in 2021.  He has numerous other medical problems including abdominal aortic aneurysm, prior CVA in January 2021, coronary artery disease treated by stent, renal insufficiency, moderately severe COPD, hypertension, ventricular tachycardia PVCs treated by 2 ablation procedures.  More recently has been diagnosed with spinal stenosis and has leg weakness from this and with neurocardiogenic syncope.  He has evening oxygen as well.  He is on aspirin 81 mg a day.    Medical History[1]     Current Medications[2]    PSHx:  SHx: c ex smoker .6 ETOH per week, or illicit drugs   FMHx: negative for history of thoracic cancer     ROS  General: negative for fever, chills, weight loss, night sweat  Head: negative for severe headache, vision change, blurred vision,   CV: negative for chest pain, dizziness, lightheadedness   Pulm: negative for shortness of breath, dyspnea on exertion, hemoptysis  GI: negative for diarrhea, constipation, abdominal pain, nausea or vomiting, BRBPR  : negative for dysuria, hematuria, incontinence  Skin: negative for rash  Heme: negative for blood thinner, bleeding disorder or clotting disorder  Endo: negative for heat or cold intolerance, weight gain or weight loss  MSK: negative for rash, edema, weakness    PHYSICAL EXAM  Constitution: well-developed well-nourished in no acute distress  HEENT: NCAT, moist mucosal membrane, neck supple, no crepitus, sclera anicteric  Lymph nodes: no cervical or supraclavicular lymphadenopathy  Cardiac: RRR, normal S1, S2, no mrg  Pulmonary: normal air movement, CTAP, no wcr  Abdomen: soft, non-distended, non-tender, no rigidity, guarding or rebound tenderness, no splenohepatomegaly  Neuro: AOx3, CNII-XII grossly normal  Ext: warm, dry, no edema noted  Skin: dry, clean and intact  Psych: mood and affect wnl    Results    I " reviewed the PFT from May 19, 2025. It showed FEV1 51 percent predicted and DLCO 55 percent predicted   I reviewed the CT scan from April 23, 2025 and compared to one 11/26/2024 and 2/2020. It showed slight enlargement of the nodule from 2024 and significant enlargement from 2020  I reviewed the PET scan from 5/24/2024. It showed slight activity in the nodule.  Some diffuse bilateral adrenal uptake.  A single focus of uptake in the right posterior seventh rib adjacent to the spine.  I reviewed the ECHO from 6/25/2024. It showed EF of 76, RV function is normal, RVSP 30 mmHg    Assessment and Plan:    This is a 75-year-old male ex heavy smoker with multiple medical problems.  Currently his activity is limited by his leg weakness and neurocardiogenic syncope.  I have recommended a left VATS wedge resection.  The tumor is adjacent to the mediastinum and adjacent to the aortic arch and may involve both.  I think there is a significant chance that this is a cancer and alternative biopsies by needle or navigational bronchoscopy would be difficult.  I explained alternatives of observation.  All of his questions were answered and he consents to left thoracoscopic exploration possible wedge resection and unlikely lobectomy.  He understands there is some risk of injury of the phrenic nerve which controls the left diaphragm as this tumor is near that area and also near the nerve to the left vocal cord as well.    I have recommended a VATS wedge resection and a small chance of lobectomy.  I mentioned a 1% risk of mortality, and additional risks of bleeding requiring a blood transfusion, infection, blood clots, irregular heartbeats and slow healing of the lung.  I mentioned a chance that a larger amount of lung would need to be removed in order to remove all of the cancer.  I explained alternatives of chemotherapy and/or focused radiation.  All questions were answered and the patient consents to partial lung resection.          Shailesh Enamorado MD  Chief Division of Thoracic and Esophageal Surgery    [facility]   Co-Director, Thoracic Oncology Program    [facility]  Professor of Surgery    [employer]  Office phone: [phone]  Fax: [phone]           [1]   Past Medical History:  Diagnosis Date    Abnormal results of pulmonary function studies     Abnormal PFT    Acute bronchitis due to other specified organisms 09/20/2018    Acute bacterial bronchitis    Anxiety disorder, unspecified     Anxiety    Diarrhea 06/21/2023    Disorder of arteries and arterioles, unspecified     Carotid arterial disease    Diverticulitis of intestine, part unspecified, without perforation or abscess without bleeding     Diverticulitis    Dizziness and giddiness 04/13/2015    Episodic lightheadedness    Essential (primary) hypertension 11/30/2013    Hypertension    Hypokalemia 06/09/2016    Diuretic-induced hypokalemia    Obstructive sleep apnea (adult) (pediatric)     RAHUL (obstructive sleep apnea)    On home oxygen therapy 05/07/2025    at night    Other specified abnormal findings of blood chemistry 06/09/2017    Elevated serum creatinine    Personal history of colonic polyps     History of colonic polyps    Personal history of other diseases of the circulatory system     History of coronary artery disease    Personal history of other diseases of the circulatory system     Personal history of coronary atherosclerosis    Personal history of other diseases of the digestive system 12/13/2022    History of gastroesophageal reflux (GERD)    Personal history of other diseases of the respiratory system     History of chronic obstructive lung disease    Personal history of other diseases of the respiratory system 02/26/2016    History of acute bronchitis with bronchospasm    Personal history of other endocrine, nutritional and metabolic disease     History of  hypothyroidism   [2]   Current Outpatient Medications:     aspirin 81 mg capsule, Take 1 tablet by mouth once daily., Disp: , Rfl:     atorvastatin (Lipitor) 40 mg tablet, TAKE 1 TABLET BY MOUTH EVERY DAY, Disp: 90 tablet, Rfl: 3    famotidine (Pepcid) 40 mg tablet, TAKE 1 TABLET (40 MG) BY MOUTH ONCE EVERY 24 HOURS., Disp: 90 tablet, Rfl: 3    levothyroxine (Synthroid, Levoxyl) 88 mcg tablet, TAKE 1 TABLET BY MOUTH ONCE DAILY., Disp: 90 tablet, Rfl: 3    losartan (Cozaar) 50 mg tablet, TAKE 1 TABLET BY MOUTH TWICE A DAY, Disp: 180 tablet, Rfl: 2    spironolactone (Aldactone) 25 mg tablet, Take 0.5 tablets (12.5 mg) by mouth once daily., Disp: 45 tablet, Rfl: 3    tadalafil (Cialis) 5 mg tablet, Take 1 tablet (5 mg) by mouth once daily., Disp: 30 tablet, Rfl: 3    albuterol 90 mcg/actuation inhaler, Inhale 2 puffs every 6 hours if needed., Disp: , Rfl:     ammonium lactate (Lac-Hydrin) 12 % lotion, APPLY TO THE AFFECTED AREAS TWICE DAILY AFTER SHOWER OR BATH, Disp: , Rfl:     Anoro Ellipta 62.5-25 mcg/actuation blister with device, Inhale 1 puff once daily., Disp: , Rfl:     calcium carbonate (Tums) 200 mg calcium chewable tablet, Chew 1 tablet (500 mg) once daily., Disp: , Rfl:     cholecalciferol (Vitamin D-3) 125 MCG (5000 UT) capsule, Take 1 capsule (125 mcg) by mouth once daily., Disp: , Rfl:     cholestyramine light (Prevalite) 4 gram packet, Take by mouth., Disp: , Rfl:     clonazePAM (KlonoPIN) 0.5 mg tablet, Take 1 tablet (0.5 mg) by mouth every 12 hours if needed., Disp: , Rfl:     ergocalciferol (Vitamin D-2) 1250 mcg (50,000 units) capsule, Take 1 capsule (1.25 mg) by mouth 1 (one) time per week., Disp: , Rfl:     fluticasone (Flonase) 50 mcg/actuation nasal spray, Administer 1 spray into each nostril once daily., Disp: , Rfl:     melatonin 5 mg tablet,chewable, Chew., Disp: , Rfl:     turmeric/turmeric ext/pepr ext (turmeric-turmeric ext-pepper) 500-5 mg capsule, Take by mouth., Disp: , Rfl:

## 2025-05-29 ENCOUNTER — TELEPHONE (OUTPATIENT)
Dept: CARDIOLOGY | Facility: CLINIC | Age: 75
End: 2025-05-29
Payer: MEDICARE

## 2025-06-02 ENCOUNTER — PRE-ADMISSION TESTING (OUTPATIENT)
Dept: PREADMISSION TESTING | Facility: HOSPITAL | Age: 75
End: 2025-06-02
Payer: MEDICARE

## 2025-06-02 VITALS
BODY MASS INDEX: 27.06 KG/M2 | SYSTOLIC BLOOD PRESSURE: 105 MMHG | HEART RATE: 80 BPM | TEMPERATURE: 97.2 F | DIASTOLIC BLOOD PRESSURE: 66 MMHG | OXYGEN SATURATION: 96 % | WEIGHT: 193.3 LBS | HEIGHT: 71 IN

## 2025-06-02 DIAGNOSIS — E03.9 HYPOTHYROIDISM, UNSPECIFIED TYPE: ICD-10-CM

## 2025-06-02 DIAGNOSIS — D49.9 NEOPLASM OF UNSPECIFIED BEHAVIOR OF UNSPECIFIED SITE: ICD-10-CM

## 2025-06-02 DIAGNOSIS — R91.1 LUNG NODULE: ICD-10-CM

## 2025-06-02 DIAGNOSIS — Z01.818 PREOPERATIVE EXAMINATION: Primary | ICD-10-CM

## 2025-06-02 LAB
ABO GROUP (TYPE) IN BLOOD: NORMAL
ALBUMIN SERPL BCP-MCNC: 4.2 G/DL (ref 3.4–5)
ALP SERPL-CCNC: 69 U/L (ref 33–136)
ALT SERPL W P-5'-P-CCNC: 27 U/L (ref 10–52)
ANION GAP SERPL CALC-SCNC: 11 MMOL/L (ref 10–20)
ANTIBODY SCREEN: NORMAL
APTT PPP: 30 SECONDS (ref 26–36)
AST SERPL W P-5'-P-CCNC: 23 U/L (ref 9–39)
BILIRUB SERPL-MCNC: 0.6 MG/DL (ref 0–1.2)
BUN SERPL-MCNC: 32 MG/DL (ref 6–23)
CALCIUM SERPL-MCNC: 9.6 MG/DL (ref 8.6–10.6)
CHLORIDE SERPL-SCNC: 104 MMOL/L (ref 98–107)
CO2 SERPL-SCNC: 27 MMOL/L (ref 21–32)
CREAT SERPL-MCNC: 1.33 MG/DL (ref 0.5–1.3)
EGFRCR SERPLBLD CKD-EPI 2021: 55 ML/MIN/1.73M*2
GLUCOSE SERPL-MCNC: 95 MG/DL (ref 74–99)
INR PPP: 1.1 (ref 0.9–1.1)
POTASSIUM SERPL-SCNC: 4.1 MMOL/L (ref 3.5–5.3)
PROT SERPL-MCNC: 6.8 G/DL (ref 6.4–8.2)
PROTHROMBIN TIME: 12.1 SECONDS (ref 9.8–12.4)
RH FACTOR (ANTIGEN D): NORMAL
SODIUM SERPL-SCNC: 138 MMOL/L (ref 136–145)
TSH SERPL-ACNC: 1.87 MIU/L (ref 0.44–3.98)

## 2025-06-02 PROCEDURE — 36415 COLL VENOUS BLD VENIPUNCTURE: CPT

## 2025-06-02 PROCEDURE — 85610 PROTHROMBIN TIME: CPT

## 2025-06-02 PROCEDURE — 99204 OFFICE O/P NEW MOD 45 MIN: CPT

## 2025-06-02 PROCEDURE — 84075 ASSAY ALKALINE PHOSPHATASE: CPT

## 2025-06-02 PROCEDURE — 84443 ASSAY THYROID STIM HORMONE: CPT

## 2025-06-02 PROCEDURE — 87081 CULTURE SCREEN ONLY: CPT

## 2025-06-02 PROCEDURE — 86900 BLOOD TYPING SEROLOGIC ABO: CPT

## 2025-06-02 RX ORDER — BUDESONIDE, GLYCOPYRROLATE, AND FORMOTEROL FUMARATE 160; 9; 4.8 UG/1; UG/1; UG/1
2 AEROSOL, METERED RESPIRATORY (INHALATION)
COMMUNITY

## 2025-06-02 RX ORDER — BISMUTH SUBSALICYLATE 262 MG/1
524 TABLET ORAL DAILY PRN
COMMUNITY

## 2025-06-02 RX ORDER — MULTIVIT WITH MINERALS/HERBS
1 TABLET ORAL DAILY
COMMUNITY

## 2025-06-02 RX ORDER — CHLORHEXIDINE GLUCONATE 40 MG/ML
SOLUTION TOPICAL DAILY PRN
Qty: 473 ML | Refills: 0 | Status: SHIPPED | OUTPATIENT
Start: 2025-06-02

## 2025-06-02 RX ORDER — CHLORHEXIDINE GLUCONATE ORAL RINSE 1.2 MG/ML
15 SOLUTION DENTAL AS NEEDED
Qty: 120 ML | Refills: 0 | Status: SHIPPED | OUTPATIENT
Start: 2025-06-02 | End: 2025-06-04

## 2025-06-02 RX ORDER — MAGNESIUM L-LACTATE 84 MG
84 TABLET, EXTENDED RELEASE ORAL DAILY
COMMUNITY

## 2025-06-02 RX ORDER — SODIUM CHLORIDE 1000 MG
1 TABLET, SOLUBLE MISCELLANEOUS 2 TIMES DAILY
COMMUNITY

## 2025-06-02 RX ORDER — ATORVASTATIN CALCIUM 80 MG/1
80 TABLET, FILM COATED ORAL DAILY
COMMUNITY

## 2025-06-02 RX ORDER — LORATADINE 10 MG/1
10 TABLET ORAL NIGHTLY
COMMUNITY

## 2025-06-02 ASSESSMENT — ENCOUNTER SYMPTOMS
DOUBLE VISION: 0
NUMBNESS: 0
DYSPNEA AT REST: 0
NAUSEA: 0
NERVOUS/ANXIOUS: 0
NECK MASS: 0
VERTIGO: 0
DYSPNEA WITH EXERTION: 0
MYALGIAS: 0
VOICE CHANGE: 0
VISUAL CHANGE: 0
RHINORRHEA: 0
LIMITED RANGE OF MOTION: 0
WHEEZING: 0
SINUS CONGESTION: 0
WEAKNESS: 0
CONFUSION: 0
NECK SWELLING: 0
PND: 0
WOUND: 0
DYSURIA: 0
EXCESSIVE BLEEDING: 0
COUGH: 0
FEVER: 0
ABDOMINAL DISTENTION: 0
JOINT SWELLING: 0
UNEXPECTED WEIGHT CHANGE: 0
NECK STIFFNESS: 0
PALPITATIONS: 0
BRUISES/BLEEDS EASILY: 0
BLOOD IN STOOL: 0
CHILLS: 0
DIFFICULTY URINATING: 0
LIGHT-HEADEDNESS: 0
HEMOPTYSIS: 0
NECK PAIN: 0
ABDOMINAL PAIN: 0
VOMITING: 0
DIARRHEA: 0
TROUBLE SWALLOWING: 0
ARTHRALGIAS: 0
SKIN CHANGES: 0
TREMORS: 0
EYE DISCHARGE: 0
CONSTIPATION: 0
POLYDIPSIA: 0
SHORTNESS OF BREATH: 0

## 2025-06-02 ASSESSMENT — DUKE ACTIVITY SCORE INDEX (DASI)
CAN YOU WALK A BLOCK OR TWO ON LEVEL GROUND: YES
CAN YOU HAVE SEXUAL RELATIONS: NO
CAN YOU RUN A SHORT DISTANCE: NO
TOTAL_SCORE: 24.95
CAN YOU DO LIGHT WORK AROUND THE HOUSE LIKE DUSTING OR WASHING DISHES: YES
CAN YOU DO YARD WORK LIKE RAKING LEAVES, WEEDING OR PUSHING A MOWER: NO
CAN YOU DO HEAVY WORK AROUND THE HOUSE LIKE SCRUBBING FLOORS OR LIFTING AND MOVING HEAVY FURNITURE: NO
CAN YOU TAKE CARE OF YOURSELF (EAT, DRESS, BATHE, OR USE TOILET): YES
CAN YOU PARTICIPATE IN MODERATE RECREATIONAL ACTIVITIES LIKE GOLF, BOWLING, DANCING, DOUBLES TENNIS OR THROWING A BASEBALL OR FOOTBALL: YES
CAN YOU PARTICIPATE IN STRENOUS SPORTS LIKE SWIMMING, SINGLES TENNIS, FOOTBALL, BASKETBALL, OR SKIING: NO
CAN YOU CLIMB A FLIGHT OF STAIRS OR WALK UP A HILL: YES
DASI METS SCORE: 5.8
CAN YOU WALK INDOORS, SUCH AS AROUND YOUR HOUSE: YES
CAN YOU DO MODERATE WORK AROUND THE HOUSE LIKE VACUUMING, SWEEPING FLOORS OR CARRYING GROCERIES: YES

## 2025-06-02 ASSESSMENT — LIFESTYLE VARIABLES: SMOKING_STATUS: NONSMOKER

## 2025-06-02 ASSESSMENT — PAIN SCALES - GENERAL: PAINLEVEL_OUTOF10: 0 - NO PAIN

## 2025-06-02 ASSESSMENT — PAIN - FUNCTIONAL ASSESSMENT: PAIN_FUNCTIONAL_ASSESSMENT: 0-10

## 2025-06-02 NOTE — PREPROCEDURE INSTRUCTIONS
Thank you for visiting [facility]) today for your pre-procedure evaluation, you were seen by     Nany Lombardi PA-C   Department of Anesthesiology and Perioperative Medicine  Main phone [phone]  Fax [phone]    This summary includes instructions and information to aid you during your perioperative period.  Please read carefully. If you have any questions about your visit today, please call the number listed above.  If you become ill or have any changes to your health before your surgery, please contact your primary care provider and alert your surgeon.    General Medications Instructions (see back for further medication instructions)  Hold Vit D supplementation day of surgery  Hold all other vitamins and supplements 7 days prior to surgery  Tylenol okay to continue, please hold Aleve/naproxen/ibuprofen (NSAIDs) for 7 days prior to surgery  No lotion/moisturizers or Deoderant after last shower prior to surgery    You will be called business day prior to surgery to confirm arrival time.     Preparing for Surgery       Preoperative Fasting Guidelines  Why must I stop eating and drinking near surgery time?  With sedation, food or liquid in your stomach can enter your lungs causing serious complications  Food can increase nausea and vomiting  When do I need to stop eating and drinking before my surgery?  Do not eat any food after midnight the night before your surgery/procedure.  You may have up to 13.5 ounces of clear liquid until TWO hours before your instructed arrival time to the hospital.  This includes water, black tea/coffee, (no milk or cream) apple juice, and electrolyte drinks (Gatorade)  You may chew gum until TWO hours before your surgery/procedure    Tobacco and Alcohol;  Do not drink alcohol or smoke within 24 hours of surgery.  It is best to quit smoking for as long as possible before any surgery or procedure.       Other Instructions  Why did I have my nose,  "under my arms, and groin swabbed? The purpose of the swab is to identify Staphylococcus aureus inside your nose or on your skin.  The swab was sent to the laboratory for culture.  A positive swab/culture for Staphylococcus aureus is called colonization or carriage.   What is Staphylococcus aureus? Staphylococcus aureus, also known as \"staph\", is a germ found on the skin or in the nose of healthy people.  Sometimes Staphylococcus aureus can get into the body and cause an infection.  This can be minor (such as pimples, boils, or other skin problems).  It might also be serious (such as a blood infection, pneumonia, or a surgical site infection). What is Staphylococcus aureus colonization or carriage? Colonization or carriage means that a person has the germ but is not sick from it.  These bacteria can be spread on the hands or when breathing or sneezing. How is Staphylococcus aureus spread? It is most often spread by close contact with a person or item that carries it. What happens if my culture is positive for Staphylococcus aureus? Your doctor/medical team will use this information to guide any antibiotic treatment which may be necessary.  Regardless of the culture results, we will clean the inside of your nose with a betadine swab just before you have your surgery. Will I get an infection if I have Staphylococcus aureus in my nose or on my skin? Anyone can get an infection with Staphylococcus aureus.  However, the best way to reduce your risk of infection is to follow the instructions provided to you for the use of your CHG soap and dental rinse.  , Body Wash; What is a home preoperative antibacterial shower? This shower is a way of cleaning the skin with a germ-killing solution before surgery.  The solution contains chlorhexidine, commonly known as CHG.  CHG is a skin cleanser with germ-killing ability.  Let your doctor know if you are allergic to chlorhexidine. Why do I need to take a preoperative antibacterial " shower? Skin is not sterile.  It is best to try to make your skin as free of germs as possible before surgery.  Proper cleansing with a germ-killing soap before surgery can lower the number of germs on your skin.  This helps to reduce the risk of infection at the surgical site.  Following the instructions listed below will help you prepare your skin for surgery.   How do I use the solution? Steps:  Begin using your CHG soap 5 days before your scheduled surgery on ___________.   First, wash and rinse your hair using the CHG soap. Keep CHG soap away from ear canals and eyes.  Rinse completely, do not condition.  Hair extensions should be removed. , Oral/Dental Rinse: What is oral/dental rinse?  It is a mouthwash. It is a way of cleaning the mouth with a germ-killing solution before your surgery.  The solution contains chlorhexidine, commonly known as CHG. It is used inside the mouth to kill a bacteria known as Staphylococcus aureus.  Let your doctor know if you are allergic to Chlorhexidine. Why do I need to use CHG oral/dental rinse? The CHG oral/dental rinse helps to kill a bacteria in your mouth known as Staphylococcus aureus.  This reduces the risk of infection at the surgical site.  Using your CHG oral/dental rinse STEPS: Use your CHG oral/dental rinse after you brush your teeth the night before (at bedtime) and the morning of your surgery.  Follow all directions on your prescription label.  Use the cap on the container to measure 15 ml.  Swish (gargle if you can) the mouthwash in your mouth for at least 30 seconds, (do not swallow) and spit out.  After you use your CHG rinse, do not rinse your mouth with water, drink or eat.  Please refer to the prescription label for the appropriate time to resume oral intake What side effects might I have using the CHG oral/dental rinse? CHG rinse will stick to plaque on the teeth.  Brush and floss just before use.  Teeth brushing will help avoid staining of plaque during use.        The Week before Surgery        Seven days before Surgery  Check your CPM medication instructions  Do the exercises provided to you by CPM   Arrange for a responsible, adult licensed  to take you home after surgery and stay with you for 24 hours.  You will not be permitted to drive yourself home if you have received any anesthetic/sedation  Five days before surgery  Check your CPM medication instructions  Do the exercises provided to you by CPM   Start using Chlorhexidene (CHG) body wash if prescribed (Continue till day of surgery)      The Day before Surgery       Check your CPM medication and all other CPM instructions including when to stop eating and drinking  You will be called with your arrival time for surgery in the late afternoon.  If you do not receive a call please reach out to your surgeon's office.  Do not smoke or drink 24 hours before surgery  Prepare items to bring with you to the hospital  Shower with your chlorhexidine wash if prescribed  Brush your teeth and use your chlorhexidine dental rinse if prescribed    The Day of Surgery       Check your CPM medication instructions  Shower, if prescribed use CHG.  Do not apply any lotions, creams, moisturizers, perfume or deodorant  Brush your teeth and use your CHG dental rinse if prescribed  Wear loose comfortable clothing  Avoid make-up  Remove  jewelry and piercings, consider professional piercing removal with a plastic spacer if needed  Bring photo ID and Insurance card  Bring an accurate medication list that includes medication dose, frequency and allergies  Bring a copy of your advanced directives (will, health care power of )  Bring any devices and controllers as well as medical devices you have been provided with for surgery (CPAP, slings, braces, etc.)  Dentures, eyeglasses, and contacts will be removed before surgery, please bring cases for contacts or glasses    Preoperative Deep Breathing Exercises    Why it is important to  do deep breathing exercises before my surgery?  Deep breathing exercises strengthen your breathing muscles.  This helps you to recover after your surgery and decreases the chance of breathing complications.    How are the deep breathing exercises done?  Sit straight with your back supported.  Breathe in deeply and slowly through your nose. Your lower rib cage should expand and your abdomen may move forward.  Hold that breath for 3 to 5 seconds.  Breathe out through pursed lips, slowly and completely.  Rest and repeat 10 times every hour while awake.  Rest longer if you become dizzy or lightheaded.    Preoperative Brain Exercises    What are brain exercises?  A brain exercise is any activity that engages your thinking (cognitive) skills.    What types of activities are considered brain exercises?  Jigsaw puzzles, crossword puzzles, word jumble, memory games, word search, and many more.  Many can be found free online or on your phone via a mobile gonzalez.    Why should I do brain exercises before my surgery?  More recent research has shown brain exercise before surgery can lower the risk of postoperative delirium (confusion) which can be especially important for older adults.  Patients who did brain exercises for 5 to 10 hours the days before surgery, cut their risk of postoperative delirium in half up to 1 week after surgery.    Sit-to-Stand Exercise    What is the sit-to-stand exercise?  The sit-to-stand exercise strengthens the muscles of your lower body and muscles in the center of your body (core muscles for stability) helping to maintain and improve your strength and mobility.  How do I do the sit-to-stand exercise?  The goal is to do this exercise without using your arms or hands.  If this is too difficult, use your arms and hands or a chair with armrests to help slowly push yourself to the standing position and lower yourself back to the sitting position. As the movement becomes easier use your arms and hands  less.    Steps to the sit-to-stand exercise  Sit up tall in a sturdy chair, knees bent, feet flat on the floor shoulder-width apart.  Shift your hips/pelvis forward in the chair to correctly position yourself for the next movement.  Lean forward at your hips.  Stand up straight putting equal weight on both feet.  Check to be sure you are properly aligned with the chair, in a slow controlled movement sit back down.  Repeat this exercise 10-15 times.  If needed you can do it fewer times until your strength improves.  Rest for 1 minute.  Do another 10-15 sit-to-stand exercises.  Try to do this in the morning and evening.       Simple things you can do to help prevent blood clots     Blood clots are blockages that can form in the body's veins. When a blood clot forms in your deep veins, it may be called a deep vein thrombosis, or DVT for short. Blood clots can happen in any part of the body where blood flows, but they are most common in the arms and legs. If a piece of a blood clot breaks free and travels to the lungs, it is called a pulmonary embolus (PE). A PE can be a very serious problem.      Being in the hospital or having surgery can raise your chances of getting a blood clot because you may not be well enough to move around as much as you normally do.         Ways you can help prevent blood clots in the hospital       Wearing SCDs  SCDs stands for Sequential Compression Devices.   SCDs are special sleeves that wrap around your legs. They attach to a pump that fills them with air to gently squeeze your legs every few minutes.  This helps return the blood in your legs to your heart.   SCDs should only be taken off when walking or bathing. SCDs may not be comfortable, but they can help save your life.              Pump SCD leg sleeves  Wearing compression stockings - if your doctor orders them. These special snug-fitting stockings gently squeeze your legs to help blood flow.       Walking. Walking helps move the  blood in your legs.   If your doctor says it is ok, try walking the halls at least   5 times a day. Ask us to help you get up, so you don't fall.      Taking any blood-thinning medicines your doctor orders.              Ways you can help prevent blood clots at home         Wearing compression stockings - if your doctor orders them.   Walking - to help move the blood in your legs.    Taking any blood-thinning medicines your doctor orders.      Signs of a blood clot or PE    Tell your doctor or nurse right away if you have any of the problems listed below.         If you are at home, seek medical care right away. Call 911 for chest pain or problems breathing.            Signs of a blood clot (DVT) - such as pain, swelling, redness, or warmth in your arm or legs.  Signs of a pulmonary embolism (PE) - such as chest pain or feeling short of breath

## 2025-06-02 NOTE — NURSING NOTE
Patient seen in PAT. Orders reviewed with PAT Provider.     Following labs obtained by documenting RN:   COAG, TSH W REFLEX, MRSA SWAB, T/S, CMP    Patient discharged with: Pre-procedure instructions/AVS.         Marisabel LOWEN, RN  [facility]   [facility]

## 2025-06-02 NOTE — CPM/PAT H&P
"CPM/PAT Evaluation       Name: Juan Jose Roman (Juan Jose Roman \"Ferny\")  /Age: 1950/75 y.o.     Visit Type:   In-Person       Chief Complaint: perioperative evaluation    HPI HPI: 74 y/o male scheduled for LOBECTOMY, LUNG, THORACOSCOPIC - Left  WEDGE RESECTION, LUNG, THORACOSCOPIC - Left  on 2025  secondary to Lung nodule  with Dr. Shailesh Enamorado who referred to [facility].  Presents to [facility] today for perioperative risk stratification and optimization. PMHX includes Anxiety, Depression, CVA(2021), left foot drop, neurocardiogenic syncope, SNHL, chronic pansinusitis, CAD (prior to ), AAA without rupture, HTN, HLD, h/o Vtach s/p ablation x2, loop recorder present, Lung nodule, COPD, RAHUL (no CPAP), nocturnal 2L NC, Hypothyroidism, vitamin D deficiency, DVT (8 years ago during albation), CKD, GERD, BPH, erectile dysfunction, Spinal stenosis, cervical spondylosis,       PCP: Kellee Chowdhury MD   Specialists:   Thoracic Surgery - Shailesh Enamorado MD   Ortho Spine - Brain Larose MD [facility],   Urology- Douglas Wagner MD   Cardiology- FIONA Garland-CNP   Pulmonology- Luke Maya MD   Neurology- Dr Watson .          Medical History[1]    Surgical History[2]    Patient  reports being sexually active.    Family History[3]    Allergies[4]    Prior to Admission medications    Medication Sig Start Date End Date Taking? Authorizing Provider   albuterol 90 mcg/actuation inhaler Inhale 2 puffs every 6 hours if needed.    Historical Provider, MD   ammonium lactate (Lac-Hydrin) 12 % lotion APPLY TO THE AFFECTED AREAS TWICE DAILY AFTER SHOWER OR BATH 6/3/24   Historical Provider, MD   Anoro Ellipta 62.5-25 mcg/actuation blister with device Inhale 1 puff once daily.    Historical Provider, MD   aspirin 81 mg capsule Take 1 tablet by mouth once daily.    Historical Provider, MD   atorvastatin (Lipitor) 40 mg tablet TAKE 1 TABLET BY MOUTH EVERY DAY 10/30/24   FIONA Garland-DICK   calcium " carbonate (Tums) 200 mg calcium chewable tablet Chew 1 tablet (500 mg) once daily.    Historical Provider, MD   cholecalciferol (Vitamin D-3) 125 MCG (5000 UT) capsule Take 1 capsule (125 mcg) by mouth once daily.    Historical Provider, MD   cholestyramine light (Prevalite) 4 gram packet Take by mouth. 5/8/21   Historical Provider, MD   clonazePAM (KlonoPIN) 0.5 mg tablet Take 1 tablet (0.5 mg) by mouth every 12 hours if needed.    Historical Provider, MD   ergocalciferol (Vitamin D-2) 1250 mcg (50,000 units) capsule Take 1 capsule (1.25 mg) by mouth 1 (one) time per week.    Historical Provider, MD   famotidine (Pepcid) 40 mg tablet TAKE 1 TABLET (40 MG) BY MOUTH ONCE EVERY 24 HOURS. 10/30/24 10/30/25  Kellee Chowdhury MD   fluticasone (Flonase) 50 mcg/actuation nasal spray Administer 1 spray into each nostril once daily. 6/3/19   Historical Provider, MD   levothyroxine (Synthroid, Levoxyl) 88 mcg tablet TAKE 1 TABLET BY MOUTH ONCE DAILY. 12/18/24   Kellee Chowdhury MD   losartan (Cozaar) 50 mg tablet TAKE 1 TABLET BY MOUTH TWICE A DAY 3/31/25   DAVE Garland   melatonin 5 mg tablet,chewable Chew.    Historical Provider, MD   spironolactone (Aldactone) 25 mg tablet Take 0.5 tablets (12.5 mg) by mouth once daily. 9/30/24 9/30/25  DAVE Garland   tadalafil (Cialis) 5 mg tablet Take 1 tablet (5 mg) by mouth once daily. 9/10/24   Augusto Avery MD   turmeric/turmeric ext/pepr ext (turmeric-turmeric ext-pepper) 500-5 mg capsule Take by mouth.    Historical Provider, MD SCOTT ROS:   Constitutional:    no fever   no chills   no unexpected weight change  Neuro/Psych:    no numbness   no weakness   no light-headedness   no tremors   no confusion   not nervous/anxious   no self-injury   no suicidal ideation  Eyes:    no discharge   no vision loss   no diplopia   no visual disturbance   use of corrective lenses (readers)  Ears:    no ear pain   no hearing loss   no vertigo   no tinnitus   no hearing  aides  Nose:    no nasal discharge   no sinus congestion   no epistaxis  Mouth:    no dental issues   no mouth pain   no oral bleeding   no mouth lesions  Throat:    no throat pain   no dysphagia   no voice change  Neck:    no neck pain   neck swelling   no neck stiffness   no neck masses  Cardio:    no chest pain   no palpitations   no peripheral edema   no dyspnea   no CASON   no paroxysmal nocturnal dyspnea  Respiratory:    no cough   no wheezing   no hemoptysis   no shortness of breath  Endocrine:    no cold intolerance   no heat intolerance   no polydipsia  GI:    no abdominal distention   no abdominal pain   no constipation   no diarrhea   no nausea   no vomiting   no blood in stool  :    no difficulty urinating   no dysuria   no oliguria   no polyuria  Musculoskeletal:    no arthralgias   no myalgias   no decreased ROM   no swelling  Hematologic:    does not bruise/bleed easily   no excessive bleeding   no history of blood transfusion   no blood clots  Skin:   no skin changes   no sores/wound   no rash      Physical Exam  Vitals reviewed.   Constitutional:       General: He is not in acute distress.     Appearance: Normal appearance. He is normal weight. He is not ill-appearing, toxic-appearing or diaphoretic.      Comments: Talkative and pleasant, no sign of acute distress   HENT:      Head: Normocephalic.      Nose: Nose normal. No congestion or rhinorrhea.      Mouth/Throat:      Mouth: Mucous membranes are moist.      Pharynx: Oropharynx is clear. No oropharyngeal exudate or posterior oropharyngeal erythema.   Eyes:      General: No scleral icterus.        Right eye: No discharge.         Left eye: No discharge.      Conjunctiva/sclera: Conjunctivae normal.   Neck:      Vascular: No carotid bruit.   Cardiovascular:      Rate and Rhythm: Normal rate and regular rhythm.      Pulses: Normal pulses.      Heart sounds: Normal heart sounds. No murmur heard.     No friction rub. No gallop.   Pulmonary:       "Effort: Pulmonary effort is normal. No respiratory distress.      Breath sounds: Normal breath sounds. No stridor. No wheezing, rhonchi or rales.   Chest:      Chest wall: No tenderness.   Musculoskeletal:         General: No swelling or tenderness.      Cervical back: Normal range of motion. No rigidity or tenderness.   Neurological:      General: No focal deficit present.      Mental Status: He is alert.   Psychiatric:         Mood and Affect: Mood normal.         Behavior: Behavior normal.         Thought Content: Thought content normal.         Judgment: Judgment normal.          PAT AIRWAY:   Airway:     Mallampati::  III    TM distance::  >3 FB    Neck ROM::  Full   Lower partial    partials      Visit Vitals  /66   Pulse 80   Temp 36.2 °C (97.2 °F) (Temporal)   Ht 1.803 m (5' 11\")   Wt 87.7 kg (193 lb 4.8 oz)   SpO2 96%   BMI 26.96 kg/m²   Smoking Status Former   BSA 2.1 m²       DASI Risk Score      Flowsheet Row Pre-Admission Testing from 6/2/2025 in [facility]   Can you take care of yourself (eat, dress, bathe, or use toilet)?  2.75 filed at 06/02/2025 1420   Can you walk indoors, such as around your house? 1.75 filed at 06/02/2025 1420   Can you walk a block or two on level ground?  2.75 filed at 06/02/2025 1420   Can you climb a flight of stairs or walk up a hill? 5.5 filed at 06/02/2025 1420   Can you run a short distance? 0 filed at 06/02/2025 1420   Can you do light work around the house like dusting or washing dishes? 2.7 filed at 06/02/2025 1420   Can you do moderate work around the house like vacuuming, sweeping floors or carrying groceries? 3.5 filed at 06/02/2025 1420   Can you do heavy work around the house like scrubbing floors or lifting and moving heavy furniture?  0 filed at 06/02/2025 1420   Can you do yard work like raking leaves, weeding or pushing a mower? 0 filed at 06/02/2025 1420   Can you have sexual relations? 0 filed at 06/02/2025 1420   Can you participate " in moderate recreational activities like golf, bowling, dancing, doubles tennis or throwing a baseball or football? 6 filed at 06/02/2025 1420   Can you participate in strenous sports like swimming, singles tennis, football, basketball, or skiing? 0 filed at 06/02/2025 1420   DASI SCORE 24.95 filed at 06/02/2025 1420   METS Score (Will be calculated only when all the questions are answered) 5.8 filed at 06/02/2025 1420          Caprini DVT Assessment      Flowsheet Row Pre-Admission Testing from 6/2/2025 in [facility]   DVT Score (IF A SCORE IS NOT CALCULATING, MUST SELECT A BMI TO COMPLETE) 17 filed at 06/02/2025 1632   Medical Factors Acute myocardial infarction, COPD, Family history of DVT/PE, History of DVT/PE filed at 06/02/2025 1632   Surgical Factors Major surgery planned, lasting over 3 hours filed at 06/02/2025 1632   BMI (BMI MUST BE CHOSEN) 30 or less filed at 06/02/2025 1632          Modified Frailty Index      Flowsheet Row Pre-Admission Testing from 6/2/2025 in [facility]   Non-independent functional status (problems with dressing, bathing, personal grooming, or cooking) 0 filed at 06/02/2025 1633   History of diabetes mellitus  0 filed at 06/02/2025 1633   History of COPD 0.0909 filed at 06/02/2025 1633   History of CHF No filed at 06/02/2025 1633   History of MI 0.0909 filed at 06/02/2025 1633   History of Percutaneous Coronary Intervention, Cardiac Surgery, or Angina 0.0909 filed at 06/02/2025 1633   Hypertension requiring the use of medication  0.0909 filed at 06/02/2025 1633   Peripheral vascular disease 0.0909 filed at 06/02/2025 1633   Impaired sensorium (cognitive impairement or loss, clouding, or delirium) 0 filed at 06/02/2025 1633   TIA or CVA withouy residual deficit 0.0909 filed at 06/02/2025 1633   Cerebrovascular accident with deficit 0 filed at 06/02/2025 1633   Modified Frailty Index Calculator .5454 filed at 06/02/2025 1634          [id number]  Stroke Risk Points  Current as of just now        N/A 0 to 9 Points:      Last Change: N/A          The [id number] risk score (Lip ANTONIO, et al. 2009. © 2010 [employer]) quantifies the risk of stroke for a patient with atrial fibrillation. For patients without atrial fibrillation or under the age of 18 this score appears as N/A. Higher score values generally indicate higher risk of stroke.        This score is not applicable to this patient. Components are not calculated.          Revised Cardiac Risk Index      Flowsheet Row Pre-Admission Testing from 6/2/2025 in [facility]   High-Risk Surgery (Intraperitoneal, Intrathoracic,Suprainguinal vascular) 1 filed at 06/02/2025 1630   History of ischemic heart disease (History of MI, History of positive exercuse test, Current chest paint considered due to myocardial ischemia, Use of nitrate therapy, ECG with pathological Q Waves) 1 filed at 06/02/2025 1630   History of congestive heart failure (pulmonary edemia, bilateral rales or S3 gallop, Paroxysmal nocturnal dyspnea, CXR showing pulmonary vascular redistribution) 0 filed at 06/02/2025 1630   History of cerebrovascular disease (Prior TIA or stroke) 1 filed at 06/02/2025 1630   Pre-operative insulin treatment 0 filed at 06/02/2025 1630   Pre-operative creatinine>2 mg/dl 0 filed at 06/02/2025 1630   Revised Cardiac Risk Calculator 3 filed at 06/02/2025 1630          Apfel Simplified Score      Flowsheet Row Pre-Admission Testing from 6/2/2025 in [facility]   Smoking status 1 filed at 06/02/2025 1632   History of motion sickness or PONV  0 filed at 06/02/2025 1632   Use of postoperative opioids 1 filed at 06/02/2025 1632   Gender - Female 0=No filed at 06/02/2025 1632   Apfel Simplified Score Calculator 2 filed at 06/02/2025 1632          Risk Analysis Index Results This Encounter    No data found in the last 10 encounters.       Stop Bang Score      Flowsheet Row  Pre-Admission Testing from 6/2/2025 in [facility]   Do you snore loudly? 1 filed at 06/02/2025 1418   Do you often feel tired or fatigued after your sleep? 1 filed at 06/02/2025 1418   Has anyone ever observed you stop breathing in your sleep? 1  [had sleep study Friday.  utilizes 2L of O2] filed at 06/02/2025 1418   Do you have or are you being treated for high blood pressure? 1 filed at 06/02/2025 1418   Recent BMI (Calculated) 28 filed at 06/02/2025 1418   Is BMI greater than 35 kg/m2? 0=No filed at 06/02/2025 1418   Age older than 50 years old? 1=Yes filed at 06/02/2025 1418   Is your neck circumference greater than 17 inches (Male) or 16 inches (Female)? 0 filed at 06/02/2025 1418   Gender - Male 1=Yes filed at 06/02/2025 1418   STOP-BANG Total Score 6 filed at 06/02/2025 1418          Prodigy: High Risk  Total Score: 20              Prodigy Age Score      Prodigy Gender Score          ARISCAT Score for Postoperative Pulmonary Complications      Flowsheet Row Pre-Admission Testing from 6/2/2025 in [facility]   Age Calculated Score 3 filed at 06/02/2025 1632   Preoperative SpO2 0 filed at 06/02/2025 1632   Respiratory infection in the last month Either upper or lower (i.e., URI, bronchitis, pneumonia), with fever and antibiotic treatment 0 filed at 06/02/2025 1632   Preoperative anemia (Hgb less than 10 g/dl) 0 filed at 06/02/2025 1632   Surgical incision  24 filed at 06/02/2025 1632   Duration of surgery  23 filed at 06/02/2025 1632   Emergency Procedure  0 filed at 06/02/2025 1632   ARISCAT Total Score  50 filed at 06/02/2025 1632          Tovar Perioperative Risk for Myocardial Infarction or Cardiac Arrest (KRISTEN)    No data to display         Assessment and Plan:   Anesthesia/Airway:  No anesthesia complications        Neuro:    #Anxiety, Depression, CVA(1/2021), left foot drop, neurocardiogenic syncope-medicated with aspirin and atorvastatin (continue both, Klonopin  "(continue), melatonin (hold 24 hours prior to surgery).  Patient reports he does not have any residual symptoms since following stroke.  Patient is currently following with neurology and has been diagnosed with neurocardiogenic syncope.  Patient states he has not had any syncopal episodes however he has had dizziness/lightheadedness in the past.  Today patient denies any symptoms.  During further discussion, patient states he thinks he was told he may have vertebral vascular disease/occlusion based on recent imaging.  There is no recent imaging to support this or current documentation.  Patient neurologist office faxed today for last office note and to confirm patient is okay to proceed.  Addendum 6/4/25-Per neurology, scanned into media, \"I have no neurological contraindications for his surgery\".  Last office note is also scanned into media and it is noted patient has vertebrobasilar insufficiency, not occlusion.      Patient is at an increased risk for post operative delirium secondary to age >/= 65, depression, sensory impairment, and type and duration of surgery.  Preoperative brain exercise educational handout provided to patient.    The patient is at an increased risk for perioperative stroke secondary to previous CVA/TIA, cardiac disease, increased age, HTN, HLD, general anesthesia, and op time >2.5 hours.       HEENT/Airway:    #SNHL, chronic pansinusitis, -medicated with allergy medication such as loratadine (continue), Flonase (continue). No further preoperative testing/intervention indicated at this time.      Cardiovascular:    #CAD (prior to 2015), suprarenal AAA without rupture, HTN, HLD, h/o Vtach s/p ablation x2, loop recorder present, - medicated with aspirin, atorvastatin, spironolactone (continue all), losartan (morning dose only on 611 event hold), cholestyramine (hold 24 hours prior), and follows with cardiology. BP today is 105/66 and denies cardiovascular symptoms. Physical exam is benign. " METS is >4 and scheduled for non-cardiac surgery. Cardiology clearance in media, okay to proceed and to continue aspirin prior to surgery. Last loop device check 2/17/25. No additional preoperative testing is currently indicated.    METS are 5.8    RCRI  >/=3 which is 15% 30 day risk of MACE (risk for cardiac death, nonfatal myocardial infarction, and nonfactal cardiac arrest    KRISTEN score which indicates a   0.1 % risk of intraoperative or 30-day postoperative MACE    EKG 8/20/24   Sinus rhythm with 1st degree AV block  Otherwise normal ECG  When compared with ECG of 21-MAY-2024 08:15,  No significant change was found  Confirmed by Fitz Love ([id number]) on 9/3/2024 1:11:30 PM      Echo 6/25/24  CONCLUSIONS:   1. The left ventricular systolic function is hyperdynamic, with a Murillo's biplane calculated ejection fraction of 76%.   2. Spectral Doppler shows an impaired relaxation pattern of left ventricular diastolic filling.   3. Moderately increased left ventricular septal thickness.   4. Mild aortic valve stenosis.   5. Mild aortic valve regurgitation.    Stress test 6/1/022      Pulmonary:    #Lung nodule, COPD, RAHUL (no CPAP), nocturnal supplemental oxygen -patient reports they currently use 2 L nasal cannula oxygen during sleep.  They deny CPAP.  They report this is the baseline since past few years.  Patient reports he does not need any rescue inhaler/nebulizer treatments.  He reports his COPD is well-controlled with daily Breztri treatment.  They currently follow with a pulmonologist.  On physical exam bilateral lung fields clear to auscultation.  Patient denies any respiratory symptoms.  No further preoperative intervention.  Patient is currently seeking surgical intervention with thoracic surgery for wedge resection for lung nodule patient.  Preoperative deep breathing educational handout provided to patient.    Patient is at increased risk of perioperative complications secondary to  age > 60, COPD, site of  surgery, duration of surgery > 2 hours, types of anesthetic    ARISCAT:    50  points which is a high (42.1%) risk of in-hospital post-op pulmonary complications     PRODIGY:  25  points which is a high risk of post op opioid induced respiratory depression episodes    STOP BAN   points which is a high risk for moderate to severe RAHUL    Pumonary toilet education discussed, patient also provided deep breathing exercises and incentive spirometry educational handout      Renal:   #CKD-patient denies history of chronic kidney disease however his wife who is present states that their primary care is currently watching his kidney function.  Repeat lab work completed today.  No further preoperative intervention    Patient is at increase risk for perioperative renal complications secondary to  Age equal to or greater than 56, HTN, COPD, use of an ace, arb, or NSAID       Endocrine:   #Hypothyroidism, vitamin D deficiency, -patient is medicated with levothyroxine (continue), TSH to be updated today.  Please hold vitamin D on day of surgery      Hematologic:      #DVT -patient reports during a previous cardiac ablation he was found with a blood clot in his leg.  He has not had any repeat occurrences.  He denies any history of pulmonary embolism.    Patient confirmed they would accept blood products if necessary.   Preoperative DVT educational handout provided to patient.  Caprini Score:  17  points which is a highest risk of perioperative VTE      Gastrointestinal:   #GERD, BPH, erectile dysfunction, -patient is currently medicated with Pepto-Bismol, Tums (hold day of surgery), famotidine (continue), magnesium lactate (hold day of surgery), and tadalafil (last dose ).  Patient denies any GI/ symptoms.  Reports all is well-controlled.  No further preoperative intervention    EAT-10 score of    0  : self-perceived oropharyngeal dysphagia scale (0-40)     Apfel: 2 points 39% risk for post operative N/V      Infectious  disease:     No infectious diagnosis or significant findings on chart review or clinical presentation and evaluation.   Prescription provided for CHG body wash and dental rinse. CHG use instructions reviewed and provided to patient.  Staph screen collected      Musculoskeletal:    #Spinal stenosis, cervical spondylosis, -patient reports he is currently following with neurosurgery and has not been told he requires surgery at this time.        Other:     Hold Vit D supplementation day of surgery  Hold all other vitamins and supplements 7 days prior to surgery  Tylenol okay to continue, please hold Aleve/naproxen/ibuprofen/Excedrin (NSAIDs) for 7 days prior to surgery  No lotion/moisturizers or Deoderant after last shower prior to surgery        Labs ordered  Recent Results (from the past 3 weeks)   Comprehensive Metabolic Panel    Collection Time: 06/02/25  2:29 PM   Result Value Ref Range    Glucose 95 74 - 99 mg/dL    Sodium 138 136 - 145 mmol/L    Potassium 4.1 3.5 - 5.3 mmol/L    Chloride 104 98 - 107 mmol/L    Bicarbonate 27 21 - 32 mmol/L    Anion Gap 11 10 - 20 mmol/L    Urea Nitrogen 32 (H) 6 - 23 mg/dL    Creatinine 1.33 (H) 0.50 - 1.30 mg/dL    eGFR 55 (L) >60 mL/min/1.73m*2    Calcium 9.6 8.6 - 10.6 mg/dL    Albumin 4.2 3.4 - 5.0 g/dL    Alkaline Phosphatase 69 33 - 136 U/L    Total Protein 6.8 6.4 - 8.2 g/dL    AST 23 9 - 39 U/L    Bilirubin, Total 0.6 0.0 - 1.2 mg/dL    ALT 27 10 - 52 U/L   Type And Screen Is this order related to pregnancy or an upcoming surgery? Yes; Where will this surgery/delivery be performed? [facility]; What is the date of the surgery? 6/12/2025; Has this patient ever had a transfusion? No; Has t...    Collection Time: 06/02/25  2:29 PM   Result Value Ref Range    ABO TYPE O     Rh TYPE POS     ANTIBODY SCREEN NEG    TSH with reflex to Free T4 if abnormal    Collection Time: 06/02/25  2:29 PM   Result Value Ref Range    Thyroid Stimulating Hormone 1.87 0.44 - 3.98  mIU/L   Coagulation Screen    Collection Time: 06/02/25  2:29 PM   Result Value Ref Range    Protime 12.1 9.8 - 12.4 seconds    INR 1.1 0.9 - 1.1    aPTT 30 26 - 36 seconds     3  5/11/25 during ED visit (scanned into chart)            Nany Lombardi PA-C     Medication instructions and NPO guidelines reviewed with the patient.  All questions or concerns discussed and addressed.   The above clinical summary has been dictated with voice recognition software. It has not been proofread for grammatical errors, typographical mistakes, or other semantic inconsistencies.   All labs/imaging included on this documentation were reviewed/considered in medical decision making for perioperative planning, including labs ordered day of CPM appointment.           [1]   Past Medical History:  Diagnosis Date    Abnormal results of pulmonary function studies     Abnormal PFT    Acute bronchitis due to other specified organisms 09/20/2018    Acute bacterial bronchitis    Anxiety     Anxiety disorder, unspecified     Anxiety    CKD (chronic kidney disease)     follows with PCP    COPD (chronic obstructive pulmonary disease) (Multi)     nocturnal oxygen, 2L.    Depression     Diarrhea 06/21/2023    Disorder of arteries and arterioles, unspecified     Carotid arterial disease    Diverticulitis of intestine, part unspecified, without perforation or abscess without bleeding     Diverticulitis    Dizziness and giddiness 04/13/2015    Episodic lightheadedness    Essential (primary) hypertension 11/30/2013    Hypertension    GERD (gastroesophageal reflux disease)     cotnrolled    HL (hearing loss)     Hypokalemia 06/09/2016    Diuretic-induced hypokalemia    Hypothyroidism     Obstructive sleep apnea (adult) (pediatric)     RAHUL (obstructive sleep apnea)    On home oxygen therapy 05/07/2025    at night    Other specified abnormal findings of blood chemistry 06/09/2017    Elevated serum creatinine    Peripheral neuropathy     Personal history of  colonic polyps     History of colonic polyps    Personal history of other diseases of the circulatory system     History of coronary artery disease    Personal history of other diseases of the circulatory system     Personal history of coronary atherosclerosis    Personal history of other diseases of the digestive system 12/13/2022    History of gastroesophageal reflux (GERD)    Personal history of other diseases of the respiratory system     History of chronic obstructive lung disease    Personal history of other diseases of the respiratory system 02/26/2016    History of acute bronchitis with bronchospasm    Personal history of other endocrine, nutritional and metabolic disease     History of hypothyroidism    Spinal stenosis    [2]   Past Surgical History:  Procedure Laterality Date    ABLATION OF DYSRHYTHMIC FOCUS      x2    CARDIAC SURGERY  05/01/2018    loop recorder    CORONARY ANGIOPLASTY WITH STENT PLACEMENT      reports prior to 2015    HERNIA REPAIR  02/27/2014    Hernia Repair    OTHER SURGICAL HISTORY  03/09/2020    Cholecystectomy    TONSILLECTOMY  05/01/2018    Tonsillectomy   [3]   Family History  Problem Relation Name Age of Onset    Other (brain tumor) Mother      Other (malignant neoplasm) Mother      Hypertension Father      Coronary artery disease Father      Other (malignant neoplasm) Father      Other (lower abdominal and iliac grafts) Father      Other (bilateral carotid endarterectomies) Father      Other (abdominal aortic aneurysm) Father      Heart attack Father      Deep vein thrombosis Father      Other (systemic lupus erythematosus) Sister      Aortic dissection Brother      Lung cancer Paternal Grandfather     [4]   Allergies  Allergen Reactions    Duloxetine Other     Urinary retention    Ipratropium Bromide Other     Urinary problems    Ohtuvayre [Ensifentrine] Other     Increased BP    Tetracycline Other    Tetracycline Hcl Other    Valsartan-Hydrochlorothiazide Unknown    Plavix  [Clopidogrel] Hives and Rash

## 2025-06-04 LAB — STAPHYLOCOCCUS SPEC CULT: NORMAL

## 2025-06-11 ENCOUNTER — ANESTHESIA EVENT (OUTPATIENT)
Dept: OPERATING ROOM | Facility: HOSPITAL | Age: 75
End: 2025-06-11
Payer: MEDICARE

## 2025-06-11 RX ORDER — CYANOCOBALAMIN (VITAMIN B-12) 250 MCG
250 TABLET ORAL DAILY
COMMUNITY

## 2025-06-11 RX ORDER — ACETYLCYSTEINE 600 MG
600 CAPSULE ORAL 2 TIMES DAILY
COMMUNITY

## 2025-06-11 NOTE — PROGRESS NOTES
"Pharmacy Medication History Review    Juan Jose Roman \"Ferny\" is a 75 y.o. male who is planned to be admitted for Lung nodule. Pharmacy called the patient prior to their scheduled procedure and reviewed the patient's hqumd-al-yanbebmzi medications for accuracy.    Medications ADDED:  NAC 600mg  Vitamin B12  Medications CHANGED:  Clonazepam 0.5mg directions from #1BIDprn TO #1/2Dprn  Medications REMOVED:   Cholestyramine 4g  Famotidine 40mg  Magtab 84mg  Sodium chloride 1000mg  Vitamin B complex  turmeric    Please review updated prior to admission medication list and comments regarding how patient may be taking medications differently by going to Admission tab --> Admission Orders --> Admit Orders / Review prior to admission medications.     Preferred pharmacy, last doses of medications, and allergies to be confirmed with patient by nursing the day of procedure.     Sources used to complete the med history include:  [employer]  Pharmacy dispense history  Patient Interview Good historian  Chart Review  Care Everywhere     Below are additional concerns with the patient's PTA list.  Patient states they are taking #1/2 tablet of clonazepam 0.5mg as needed. Does not take daily. L.F. 04/23/25 #15/30d and [employer] verified    Geovanna Estevez [facility]  Please reach out via Secure Chat for questions     "

## 2025-06-12 ENCOUNTER — HOSPITAL ENCOUNTER (INPATIENT)
Facility: HOSPITAL | Age: 75
LOS: 1 days | Discharge: HOME | DRG: 165 | End: 2025-06-13
Attending: THORACIC SURGERY (CARDIOTHORACIC VASCULAR SURGERY) | Admitting: THORACIC SURGERY (CARDIOTHORACIC VASCULAR SURGERY)
Payer: MEDICARE

## 2025-06-12 ENCOUNTER — APPOINTMENT (OUTPATIENT)
Dept: CARDIOLOGY | Facility: HOSPITAL | Age: 75
DRG: 165 | End: 2025-06-12
Payer: MEDICARE

## 2025-06-12 ENCOUNTER — ANESTHESIA (OUTPATIENT)
Dept: OPERATING ROOM | Facility: HOSPITAL | Age: 75
End: 2025-06-12
Payer: MEDICARE

## 2025-06-12 ENCOUNTER — APPOINTMENT (OUTPATIENT)
Dept: RADIOLOGY | Facility: HOSPITAL | Age: 75
DRG: 165 | End: 2025-06-12
Payer: MEDICARE

## 2025-06-12 DIAGNOSIS — R91.1 LUNG NODULE: Primary | ICD-10-CM

## 2025-06-12 PROBLEM — Z99.81 HISTORY OF HOME OXYGEN THERAPY: Status: ACTIVE | Noted: 2025-06-12

## 2025-06-12 PROCEDURE — 87116 MYCOBACTERIA CULTURE: CPT | Performed by: THORACIC SURGERY (CARDIOTHORACIC VASCULAR SURGERY)

## 2025-06-12 PROCEDURE — 93005 ELECTROCARDIOGRAM TRACING: CPT

## 2025-06-12 PROCEDURE — 3600000004 HC OR TIME - INITIAL BASE CHARGE - PROCEDURE LEVEL FOUR: Performed by: THORACIC SURGERY (CARDIOTHORACIC VASCULAR SURGERY)

## 2025-06-12 PROCEDURE — 7100000002 HC RECOVERY ROOM TIME - EACH INCREMENTAL 1 MINUTE: Performed by: THORACIC SURGERY (CARDIOTHORACIC VASCULAR SURGERY)

## 2025-06-12 PROCEDURE — 3600000009 HC OR TIME - EACH INCREMENTAL 1 MINUTE - PROCEDURE LEVEL FOUR: Performed by: THORACIC SURGERY (CARDIOTHORACIC VASCULAR SURGERY)

## 2025-06-12 PROCEDURE — 36620 INSERTION CATHETER ARTERY: CPT | Performed by: STUDENT IN AN ORGANIZED HEALTH CARE EDUCATION/TRAINING PROGRAM

## 2025-06-12 PROCEDURE — 88331 PATH CONSLTJ SURG 1 BLK 1SPC: CPT | Performed by: STUDENT IN AN ORGANIZED HEALTH CARE EDUCATION/TRAINING PROGRAM

## 2025-06-12 PROCEDURE — 3700000002 HC GENERAL ANESTHESIA TIME - EACH INCREMENTAL 1 MINUTE: Performed by: THORACIC SURGERY (CARDIOTHORACIC VASCULAR SURGERY)

## 2025-06-12 PROCEDURE — 2500000004 HC RX 250 GENERAL PHARMACY W/ HCPCS (ALT 636 FOR OP/ED): Performed by: THORACIC SURGERY (CARDIOTHORACIC VASCULAR SURGERY)

## 2025-06-12 PROCEDURE — 2720000007 HC OR 272 NO HCPCS: Performed by: THORACIC SURGERY (CARDIOTHORACIC VASCULAR SURGERY)

## 2025-06-12 PROCEDURE — 96372 THER/PROPH/DIAG INJ SC/IM: CPT | Performed by: ANESTHESIOLOGIST ASSISTANT

## 2025-06-12 PROCEDURE — 2500000004 HC RX 250 GENERAL PHARMACY W/ HCPCS (ALT 636 FOR OP/ED)

## 2025-06-12 PROCEDURE — 76937 US GUIDE VASCULAR ACCESS: CPT | Performed by: STUDENT IN AN ORGANIZED HEALTH CARE EDUCATION/TRAINING PROGRAM

## 2025-06-12 PROCEDURE — 71045 X-RAY EXAM CHEST 1 VIEW: CPT | Performed by: RADIOLOGY

## 2025-06-12 PROCEDURE — 2500000005 HC RX 250 GENERAL PHARMACY W/O HCPCS: Performed by: STUDENT IN AN ORGANIZED HEALTH CARE EDUCATION/TRAINING PROGRAM

## 2025-06-12 PROCEDURE — 87070 CULTURE OTHR SPECIMN AEROBIC: CPT | Performed by: THORACIC SURGERY (CARDIOTHORACIC VASCULAR SURGERY)

## 2025-06-12 PROCEDURE — 7100000001 HC RECOVERY ROOM TIME - INITIAL BASE CHARGE: Performed by: THORACIC SURGERY (CARDIOTHORACIC VASCULAR SURGERY)

## 2025-06-12 PROCEDURE — 2500000001 HC RX 250 WO HCPCS SELF ADMINISTERED DRUGS (ALT 637 FOR MEDICARE OP): Performed by: STUDENT IN AN ORGANIZED HEALTH CARE EDUCATION/TRAINING PROGRAM

## 2025-06-12 PROCEDURE — 99100 ANES PT EXTEME AGE<1 YR&>70: CPT | Performed by: STUDENT IN AN ORGANIZED HEALTH CARE EDUCATION/TRAINING PROGRAM

## 2025-06-12 PROCEDURE — 88307 TISSUE EXAM BY PATHOLOGIST: CPT | Performed by: STUDENT IN AN ORGANIZED HEALTH CARE EDUCATION/TRAINING PROGRAM

## 2025-06-12 PROCEDURE — A32666 PR THORACOSCOPY W/THERA WEDGE RESEXN INITIAL UNILAT: Performed by: ANESTHESIOLOGIST ASSISTANT

## 2025-06-12 PROCEDURE — 2500000004 HC RX 250 GENERAL PHARMACY W/ HCPCS (ALT 636 FOR OP/ED): Mod: JZ,TB | Performed by: STUDENT IN AN ORGANIZED HEALTH CARE EDUCATION/TRAINING PROGRAM

## 2025-06-12 PROCEDURE — 88307 TISSUE EXAM BY PATHOLOGIST: CPT | Mod: TC,SUR | Performed by: THORACIC SURGERY (CARDIOTHORACIC VASCULAR SURGERY)

## 2025-06-12 PROCEDURE — 36415 COLL VENOUS BLD VENIPUNCTURE: CPT | Performed by: STUDENT IN AN ORGANIZED HEALTH CARE EDUCATION/TRAINING PROGRAM

## 2025-06-12 PROCEDURE — 71045 X-RAY EXAM CHEST 1 VIEW: CPT

## 2025-06-12 PROCEDURE — 2500000004 HC RX 250 GENERAL PHARMACY W/ HCPCS (ALT 636 FOR OP/ED): Performed by: ANESTHESIOLOGIST ASSISTANT

## 2025-06-12 PROCEDURE — 3700000001 HC GENERAL ANESTHESIA TIME - INITIAL BASE CHARGE: Performed by: THORACIC SURGERY (CARDIOTHORACIC VASCULAR SURGERY)

## 2025-06-12 PROCEDURE — 1200000002 HC GENERAL ROOM WITH TELEMETRY DAILY

## 2025-06-12 PROCEDURE — 2500000002 HC RX 250 W HCPCS SELF ADMINISTERED DRUGS (ALT 637 FOR MEDICARE OP, ALT 636 FOR OP/ED): Performed by: STUDENT IN AN ORGANIZED HEALTH CARE EDUCATION/TRAINING PROGRAM

## 2025-06-12 PROCEDURE — 87102 FUNGUS ISOLATION CULTURE: CPT | Performed by: THORACIC SURGERY (CARDIOTHORACIC VASCULAR SURGERY)

## 2025-06-12 PROCEDURE — 0BBG4ZZ EXCISION OF LEFT UPPER LUNG LOBE, PERCUTANEOUS ENDOSCOPIC APPROACH: ICD-10-PCS | Performed by: THORACIC SURGERY (CARDIOTHORACIC VASCULAR SURGERY)

## 2025-06-12 PROCEDURE — 32666 THORACOSCOPY W/WEDGE RESECT: CPT | Performed by: THORACIC SURGERY (CARDIOTHORACIC VASCULAR SURGERY)

## 2025-06-12 PROCEDURE — 2500000004 HC RX 250 GENERAL PHARMACY W/ HCPCS (ALT 636 FOR OP/ED): Performed by: STUDENT IN AN ORGANIZED HEALTH CARE EDUCATION/TRAINING PROGRAM

## 2025-06-12 PROCEDURE — 2500000005 HC RX 250 GENERAL PHARMACY W/O HCPCS: Performed by: PHYSICIAN ASSISTANT

## 2025-06-12 PROCEDURE — A32666 PR THORACOSCOPY W/THERA WEDGE RESEXN INITIAL UNILAT: Performed by: STUDENT IN AN ORGANIZED HEALTH CARE EDUCATION/TRAINING PROGRAM

## 2025-06-12 PROCEDURE — 36556 INSERT NON-TUNNEL CV CATH: CPT | Performed by: STUDENT IN AN ORGANIZED HEALTH CARE EDUCATION/TRAINING PROGRAM

## 2025-06-12 RX ORDER — ACETAMINOPHEN 10 MG/ML
INJECTION, SOLUTION INTRAVENOUS AS NEEDED
Status: DISCONTINUED | OUTPATIENT
Start: 2025-06-12 | End: 2025-06-12

## 2025-06-12 RX ORDER — PHENYLEPHRINE HCL IN 0.9% NACL 0.4MG/10ML
SYRINGE (ML) INTRAVENOUS AS NEEDED
Status: DISCONTINUED | OUTPATIENT
Start: 2025-06-12 | End: 2025-06-12

## 2025-06-12 RX ORDER — ESMOLOL HYDROCHLORIDE 10 MG/ML
INJECTION INTRAVENOUS AS NEEDED
Status: DISCONTINUED | OUTPATIENT
Start: 2025-06-12 | End: 2025-06-12

## 2025-06-12 RX ORDER — METHOCARBAMOL 100 MG/ML
1000 INJECTION, SOLUTION INTRAMUSCULAR; INTRAVENOUS ONCE
Status: COMPLETED | OUTPATIENT
Start: 2025-06-12 | End: 2025-06-12

## 2025-06-12 RX ORDER — OXYCODONE HYDROCHLORIDE 5 MG/1
10 TABLET ORAL EVERY 4 HOURS PRN
Status: DISCONTINUED | OUTPATIENT
Start: 2025-06-12 | End: 2025-06-12 | Stop reason: HOSPADM

## 2025-06-12 RX ORDER — CETIRIZINE HYDROCHLORIDE 10 MG/1
10 TABLET ORAL DAILY
Status: DISCONTINUED | OUTPATIENT
Start: 2025-06-13 | End: 2025-06-13 | Stop reason: HOSPADM

## 2025-06-12 RX ORDER — HYDROMORPHONE HYDROCHLORIDE 1 MG/ML
INJECTION, SOLUTION INTRAMUSCULAR; INTRAVENOUS; SUBCUTANEOUS AS NEEDED
Status: DISCONTINUED | OUTPATIENT
Start: 2025-06-12 | End: 2025-06-12

## 2025-06-12 RX ORDER — LIDOCAINE HYDROCHLORIDE 20 MG/ML
INJECTION, SOLUTION INFILTRATION; PERINEURAL AS NEEDED
Status: DISCONTINUED | OUTPATIENT
Start: 2025-06-12 | End: 2025-06-12

## 2025-06-12 RX ORDER — METHOCARBAMOL 500 MG/1
500 TABLET, FILM COATED ORAL EVERY 8 HOURS SCHEDULED
Status: DISCONTINUED | OUTPATIENT
Start: 2025-06-12 | End: 2025-06-13 | Stop reason: HOSPADM

## 2025-06-12 RX ORDER — HEPARIN SODIUM 5000 [USP'U]/ML
5000 INJECTION, SOLUTION INTRAVENOUS; SUBCUTANEOUS EVERY 8 HOURS
Status: DISCONTINUED | OUTPATIENT
Start: 2025-06-12 | End: 2025-06-13 | Stop reason: HOSPADM

## 2025-06-12 RX ORDER — ONDANSETRON HYDROCHLORIDE 2 MG/ML
4 INJECTION, SOLUTION INTRAVENOUS EVERY 8 HOURS PRN
Status: DISCONTINUED | OUTPATIENT
Start: 2025-06-12 | End: 2025-06-13 | Stop reason: HOSPADM

## 2025-06-12 RX ORDER — ONDANSETRON HYDROCHLORIDE 2 MG/ML
INJECTION, SOLUTION INTRAVENOUS AS NEEDED
Status: DISCONTINUED | OUTPATIENT
Start: 2025-06-12 | End: 2025-06-12

## 2025-06-12 RX ORDER — ALBUTEROL SULFATE 90 UG/1
2 INHALANT RESPIRATORY (INHALATION) EVERY 6 HOURS PRN
Status: DISCONTINUED | OUTPATIENT
Start: 2025-06-12 | End: 2025-06-13 | Stop reason: HOSPADM

## 2025-06-12 RX ORDER — FENTANYL CITRATE 50 UG/ML
INJECTION, SOLUTION INTRAMUSCULAR; INTRAVENOUS AS NEEDED
Status: DISCONTINUED | OUTPATIENT
Start: 2025-06-12 | End: 2025-06-12

## 2025-06-12 RX ORDER — CEFAZOLIN SODIUM 2 G/100ML
2 INJECTION, SOLUTION INTRAVENOUS EVERY 8 HOURS
Status: COMPLETED | OUTPATIENT
Start: 2025-06-12 | End: 2025-06-13

## 2025-06-12 RX ORDER — CLONAZEPAM 0.5 MG/1
0.25 TABLET ORAL DAILY PRN
Status: DISCONTINUED | OUTPATIENT
Start: 2025-06-12 | End: 2025-06-13 | Stop reason: HOSPADM

## 2025-06-12 RX ORDER — OXYCODONE HYDROCHLORIDE 5 MG/1
5 TABLET ORAL EVERY 4 HOURS PRN
Status: DISCONTINUED | OUTPATIENT
Start: 2025-06-12 | End: 2025-06-13 | Stop reason: HOSPADM

## 2025-06-12 RX ORDER — LEVOTHYROXINE SODIUM 88 UG/1
88 TABLET ORAL DAILY
Status: DISCONTINUED | OUTPATIENT
Start: 2025-06-13 | End: 2025-06-13 | Stop reason: HOSPADM

## 2025-06-12 RX ORDER — LOSARTAN POTASSIUM 25 MG/1
25 TABLET ORAL 2 TIMES DAILY
Status: DISCONTINUED | OUTPATIENT
Start: 2025-06-12 | End: 2025-06-12

## 2025-06-12 RX ORDER — ONDANSETRON 4 MG/1
4 TABLET, ORALLY DISINTEGRATING ORAL EVERY 8 HOURS PRN
Status: DISCONTINUED | OUTPATIENT
Start: 2025-06-12 | End: 2025-06-13 | Stop reason: HOSPADM

## 2025-06-12 RX ORDER — NALOXONE HYDROCHLORIDE 0.4 MG/ML
0.2 INJECTION, SOLUTION INTRAMUSCULAR; INTRAVENOUS; SUBCUTANEOUS EVERY 5 MIN PRN
Status: DISCONTINUED | OUTPATIENT
Start: 2025-06-12 | End: 2025-06-13 | Stop reason: HOSPADM

## 2025-06-12 RX ORDER — NITROGLYCERIN 20 MG/100ML
INJECTION INTRAVENOUS AS NEEDED
Status: DISCONTINUED | OUTPATIENT
Start: 2025-06-12 | End: 2025-06-12

## 2025-06-12 RX ORDER — LIDOCAINE HYDROCHLORIDE 10 MG/ML
0.1 INJECTION, SOLUTION INFILTRATION; PERINEURAL ONCE
Status: DISCONTINUED | OUTPATIENT
Start: 2025-06-12 | End: 2025-06-12 | Stop reason: HOSPADM

## 2025-06-12 RX ORDER — OXYCODONE HYDROCHLORIDE 10 MG/1
10 TABLET ORAL EVERY 4 HOURS PRN
Status: DISCONTINUED | OUTPATIENT
Start: 2025-06-12 | End: 2025-06-13 | Stop reason: HOSPADM

## 2025-06-12 RX ORDER — BUPIVACAINE HCL/EPINEPHRINE 0.25-.0005
VIAL (ML) INJECTION AS NEEDED
Status: DISCONTINUED | OUTPATIENT
Start: 2025-06-12 | End: 2025-06-12 | Stop reason: HOSPADM

## 2025-06-12 RX ORDER — ASPIRIN 81 MG/1
81 TABLET ORAL ONCE
Status: COMPLETED | OUTPATIENT
Start: 2025-06-13 | End: 2025-06-13

## 2025-06-12 RX ORDER — SPIRONOLACTONE 25 MG/1
12.5 TABLET ORAL DAILY
Status: CANCELLED | OUTPATIENT
Start: 2025-06-13

## 2025-06-12 RX ORDER — LIDOCAINE 560 MG/1
1 PATCH PERCUTANEOUS; TOPICAL; TRANSDERMAL DAILY
Status: DISCONTINUED | OUTPATIENT
Start: 2025-06-12 | End: 2025-06-13 | Stop reason: HOSPADM

## 2025-06-12 RX ORDER — HYDROMORPHONE HYDROCHLORIDE 0.2 MG/ML
0.2 INJECTION INTRAMUSCULAR; INTRAVENOUS; SUBCUTANEOUS EVERY 5 MIN PRN
Status: DISCONTINUED | OUTPATIENT
Start: 2025-06-12 | End: 2025-06-12 | Stop reason: HOSPADM

## 2025-06-12 RX ORDER — ROCURONIUM BROMIDE 10 MG/ML
INJECTION, SOLUTION INTRAVENOUS AS NEEDED
Status: DISCONTINUED | OUTPATIENT
Start: 2025-06-12 | End: 2025-06-12

## 2025-06-12 RX ORDER — ALBUTEROL SULFATE 90 UG/1
2 INHALANT RESPIRATORY (INHALATION)
Status: CANCELLED | OUTPATIENT
Start: 2025-06-12

## 2025-06-12 RX ORDER — LOSARTAN POTASSIUM 25 MG/1
25 TABLET ORAL 2 TIMES DAILY
Status: DISCONTINUED | OUTPATIENT
Start: 2025-06-13 | End: 2025-06-13 | Stop reason: HOSPADM

## 2025-06-12 RX ORDER — SPIRONOLACTONE 25 MG/1
12.5 TABLET ORAL DAILY
Status: DISCONTINUED | OUTPATIENT
Start: 2025-06-12 | End: 2025-06-13 | Stop reason: HOSPADM

## 2025-06-12 RX ORDER — CEFAZOLIN 1 G/1
INJECTION, POWDER, FOR SOLUTION INTRAVENOUS AS NEEDED
Status: DISCONTINUED | OUTPATIENT
Start: 2025-06-12 | End: 2025-06-12

## 2025-06-12 RX ORDER — LIDOCAINE HCL/PF 100 MG/5ML
SYRINGE (ML) INTRAVENOUS AS NEEDED
Status: DISCONTINUED | OUTPATIENT
Start: 2025-06-12 | End: 2025-06-12

## 2025-06-12 RX ORDER — ATORVASTATIN CALCIUM 80 MG/1
80 TABLET, FILM COATED ORAL DAILY
Status: DISCONTINUED | OUTPATIENT
Start: 2025-06-13 | End: 2025-06-13 | Stop reason: HOSPADM

## 2025-06-12 RX ORDER — ACETAMINOPHEN 500 MG
5 TABLET ORAL NIGHTLY PRN
Status: DISCONTINUED | OUTPATIENT
Start: 2025-06-12 | End: 2025-06-13 | Stop reason: HOSPADM

## 2025-06-12 RX ORDER — ACETAMINOPHEN 325 MG/1
650 TABLET ORAL EVERY 6 HOURS
Status: DISCONTINUED | OUTPATIENT
Start: 2025-06-12 | End: 2025-06-13 | Stop reason: HOSPADM

## 2025-06-12 RX ORDER — PROPOFOL 10 MG/ML
INJECTION, EMULSION INTRAVENOUS AS NEEDED
Status: DISCONTINUED | OUTPATIENT
Start: 2025-06-12 | End: 2025-06-12

## 2025-06-12 RX ORDER — METOPROLOL TARTRATE 25 MG/1
25 TABLET, FILM COATED ORAL EVERY 8 HOURS SCHEDULED
Status: CANCELLED | OUTPATIENT
Start: 2025-06-12

## 2025-06-12 RX ORDER — HEPARIN SODIUM 5000 [USP'U]/ML
INJECTION, SOLUTION INTRAVENOUS; SUBCUTANEOUS AS NEEDED
Status: DISCONTINUED | OUTPATIENT
Start: 2025-06-12 | End: 2025-06-12

## 2025-06-12 RX ORDER — MIDAZOLAM HYDROCHLORIDE 1 MG/ML
INJECTION INTRAMUSCULAR; INTRAVENOUS AS NEEDED
Status: DISCONTINUED | OUTPATIENT
Start: 2025-06-12 | End: 2025-06-12

## 2025-06-12 RX ORDER — CALCIUM CARBONATE 200(500)MG
1 TABLET,CHEWABLE ORAL DAILY
Status: DISCONTINUED | OUTPATIENT
Start: 2025-06-12 | End: 2025-06-13 | Stop reason: HOSPADM

## 2025-06-12 RX ORDER — ONDANSETRON HYDROCHLORIDE 2 MG/ML
4 INJECTION, SOLUTION INTRAVENOUS ONCE AS NEEDED
Status: DISCONTINUED | OUTPATIENT
Start: 2025-06-12 | End: 2025-06-12 | Stop reason: HOSPADM

## 2025-06-12 RX ORDER — FLUTICASONE PROPIONATE 50 MCG
1 SPRAY, SUSPENSION (ML) NASAL DAILY
Status: DISCONTINUED | OUTPATIENT
Start: 2025-06-12 | End: 2025-06-13 | Stop reason: HOSPADM

## 2025-06-12 RX ORDER — HYDRALAZINE HYDROCHLORIDE 20 MG/ML
INJECTION INTRAMUSCULAR; INTRAVENOUS AS NEEDED
Status: DISCONTINUED | OUTPATIENT
Start: 2025-06-12 | End: 2025-06-12

## 2025-06-12 RX ORDER — OXYCODONE HYDROCHLORIDE 5 MG/1
5 TABLET ORAL EVERY 4 HOURS PRN
Status: DISCONTINUED | OUTPATIENT
Start: 2025-06-12 | End: 2025-06-12 | Stop reason: HOSPADM

## 2025-06-12 RX ADMIN — CEFAZOLIN SODIUM 2 G: 2 INJECTION, SOLUTION INTRAVENOUS at 20:25

## 2025-06-12 RX ADMIN — Medication 120 MCG: at 07:58

## 2025-06-12 RX ADMIN — OXYCODONE 5 MG: 5 TABLET ORAL at 19:16

## 2025-06-12 RX ADMIN — HYDROMORPHONE HYDROCHLORIDE 0.2 MG: 0.2 INJECTION, SOLUTION INTRAMUSCULAR; INTRAVENOUS; SUBCUTANEOUS at 11:13

## 2025-06-12 RX ADMIN — CALCIUM CARBONATE 1 TABLET: 500 TABLET, CHEWABLE ORAL at 20:25

## 2025-06-12 RX ADMIN — OXYCODONE HYDROCHLORIDE 10 MG: 10 TABLET ORAL at 23:46

## 2025-06-12 RX ADMIN — FENTANYL CITRATE 50 MCG: 50 INJECTION, SOLUTION INTRAMUSCULAR; INTRAVENOUS at 07:36

## 2025-06-12 RX ADMIN — CEFAZOLIN 2 G: 330 INJECTION, POWDER, FOR SOLUTION INTRAMUSCULAR; INTRAVENOUS at 08:22

## 2025-06-12 RX ADMIN — PROPOFOL 20 MG: 10 INJECTION, EMULSION INTRAVENOUS at 08:39

## 2025-06-12 RX ADMIN — HEPARIN SODIUM 5000 UNITS: 5000 INJECTION, SOLUTION INTRAVENOUS; SUBCUTANEOUS at 07:35

## 2025-06-12 RX ADMIN — NITROGLYCERIN 200 MCG: 20 INJECTION INTRAVENOUS at 09:43

## 2025-06-12 RX ADMIN — PROPOFOL 10 MG: 10 INJECTION, EMULSION INTRAVENOUS at 09:04

## 2025-06-12 RX ADMIN — METHOCARBAMOL 1000 MG: 100 INJECTION INTRAMUSCULAR; INTRAVENOUS at 12:11

## 2025-06-12 RX ADMIN — HYDRALAZINE HYDROCHLORIDE 10 MG: 20 INJECTION INTRAMUSCULAR; INTRAVENOUS at 09:32

## 2025-06-12 RX ADMIN — ACETAMINOPHEN 1000 MG: 10 INJECTION, SOLUTION INTRAVENOUS at 09:12

## 2025-06-12 RX ADMIN — LIDOCAINE 4% 1 PATCH: 40 PATCH TOPICAL at 20:24

## 2025-06-12 RX ADMIN — FLUTICASONE PROPIONATE 1 SPRAY: 50 SPRAY, METERED NASAL at 20:24

## 2025-06-12 RX ADMIN — HYDROMORPHONE HYDROCHLORIDE 0.2 MG: 0.2 INJECTION, SOLUTION INTRAMUSCULAR; INTRAVENOUS; SUBCUTANEOUS at 10:12

## 2025-06-12 RX ADMIN — ONDANSETRON 4 MG: 2 INJECTION, SOLUTION INTRAMUSCULAR; INTRAVENOUS at 09:25

## 2025-06-12 RX ADMIN — FENTANYL CITRATE 50 MCG: 50 INJECTION, SOLUTION INTRAMUSCULAR; INTRAVENOUS at 08:40

## 2025-06-12 RX ADMIN — HEPARIN SODIUM 5000 UNITS: 5000 INJECTION INTRAVENOUS; SUBCUTANEOUS at 20:25

## 2025-06-12 RX ADMIN — PROPOFOL 40 MG: 10 INJECTION, EMULSION INTRAVENOUS at 08:13

## 2025-06-12 RX ADMIN — HYDROMORPHONE HYDROCHLORIDE 0.2 MG: 1 INJECTION, SOLUTION INTRAMUSCULAR; INTRAVENOUS; SUBCUTANEOUS at 09:53

## 2025-06-12 RX ADMIN — MIDAZOLAM HYDROCHLORIDE 1 MG: 2 INJECTION, SOLUTION INTRAMUSCULAR; INTRAVENOUS at 07:35

## 2025-06-12 RX ADMIN — Medication 5 MG: at 20:27

## 2025-06-12 RX ADMIN — HYDROMORPHONE HYDROCHLORIDE 0.2 MG: 0.2 INJECTION, SOLUTION INTRAMUSCULAR; INTRAVENOUS; SUBCUTANEOUS at 17:18

## 2025-06-12 RX ADMIN — ESMOLOL HYDROCHLORIDE 20 MG: 100 INJECTION, SOLUTION INTRAVENOUS at 09:38

## 2025-06-12 RX ADMIN — SUGAMMADEX 200 MG: 100 INJECTION, SOLUTION INTRAVENOUS at 09:43

## 2025-06-12 RX ADMIN — Medication 2 L/MIN: at 10:15

## 2025-06-12 RX ADMIN — ESMOLOL HYDROCHLORIDE 30 MG: 100 INJECTION, SOLUTION INTRAVENOUS at 09:37

## 2025-06-12 RX ADMIN — ROCURONIUM BROMIDE 100 MG: 10 INJECTION, SOLUTION INTRAVENOUS at 07:36

## 2025-06-12 RX ADMIN — METHOCARBAMOL 500 MG: 500 TABLET ORAL at 21:46

## 2025-06-12 RX ADMIN — NITROGLYCERIN 400 MCG: 20 INJECTION INTRAVENOUS at 09:44

## 2025-06-12 RX ADMIN — SODIUM CHLORIDE, SODIUM LACTATE, POTASSIUM CHLORIDE, AND CALCIUM CHLORIDE: 600; 310; 30; 20 INJECTION, SOLUTION INTRAVENOUS at 07:33

## 2025-06-12 RX ADMIN — FENTANYL CITRATE 50 MCG: 50 INJECTION, SOLUTION INTRAMUSCULAR; INTRAVENOUS at 08:34

## 2025-06-12 RX ADMIN — HYDROMORPHONE HYDROCHLORIDE 0.2 MG: 1 INJECTION, SOLUTION INTRAMUSCULAR; INTRAVENOUS; SUBCUTANEOUS at 21:46

## 2025-06-12 RX ADMIN — LIDOCAINE HYDROCHLORIDE 100 MG: 20 INJECTION INTRAVENOUS at 07:36

## 2025-06-12 RX ADMIN — ESMOLOL HYDROCHLORIDE 50 MG: 100 INJECTION, SOLUTION INTRAVENOUS at 09:04

## 2025-06-12 RX ADMIN — NITROGLYCERIN 200 MCG: 20 INJECTION INTRAVENOUS at 09:40

## 2025-06-12 RX ADMIN — DEXAMETHASONE SODIUM PHOSPHATE 4 MG: 4 INJECTION INTRA-ARTICULAR; INTRALESIONAL; INTRAMUSCULAR; INTRAVENOUS; SOFT TISSUE at 07:36

## 2025-06-12 RX ADMIN — PROPOFOL 130 MG: 10 INJECTION, EMULSION INTRAVENOUS at 07:36

## 2025-06-12 RX ADMIN — HYDROMORPHONE HYDROCHLORIDE 0.5 MG: 1 INJECTION, SOLUTION INTRAMUSCULAR; INTRAVENOUS; SUBCUTANEOUS at 09:25

## 2025-06-12 RX ADMIN — ESMOLOL HYDROCHLORIDE 40 MG: 100 INJECTION, SOLUTION INTRAVENOUS at 08:13

## 2025-06-12 RX ADMIN — HYDROMORPHONE HYDROCHLORIDE 0.2 MG: 0.2 INJECTION, SOLUTION INTRAMUSCULAR; INTRAVENOUS; SUBCUTANEOUS at 10:26

## 2025-06-12 RX ADMIN — HYDROMORPHONE HYDROCHLORIDE 0.2 MG: 0.2 INJECTION, SOLUTION INTRAMUSCULAR; INTRAVENOUS; SUBCUTANEOUS at 15:42

## 2025-06-12 RX ADMIN — FENTANYL CITRATE 50 MCG: 50 INJECTION, SOLUTION INTRAMUSCULAR; INTRAVENOUS at 08:13

## 2025-06-12 SDOH — HEALTH STABILITY: MENTAL HEALTH: CURRENT SMOKER: 0

## 2025-06-12 ASSESSMENT — COLUMBIA-SUICIDE SEVERITY RATING SCALE - C-SSRS
6. HAVE YOU EVER DONE ANYTHING, STARTED TO DO ANYTHING, OR PREPARED TO DO ANYTHING TO END YOUR LIFE?: NO
1. IN THE PAST MONTH, HAVE YOU WISHED YOU WERE DEAD OR WISHED YOU COULD GO TO SLEEP AND NOT WAKE UP?: NO
2. HAVE YOU ACTUALLY HAD ANY THOUGHTS OF KILLING YOURSELF?: NO

## 2025-06-12 ASSESSMENT — PAIN SCALES - GENERAL
PAINLEVEL_OUTOF10: 6
PAINLEVEL_OUTOF10: 4
PAINLEVEL_OUTOF10: 3
PAINLEVEL_OUTOF10: 8
PAINLEVEL_OUTOF10: 4
PAINLEVEL_OUTOF10: 6
PAINLEVEL_OUTOF10: 6
PAINLEVEL_OUTOF10: 0 - NO PAIN
PAINLEVEL_OUTOF10: 4
PAINLEVEL_OUTOF10: 3
PAINLEVEL_OUTOF10: 3
PAINLEVEL_OUTOF10: 5 - MODERATE PAIN
PAINLEVEL_OUTOF10: 3
PAINLEVEL_OUTOF10: 0 - NO PAIN
PAINLEVEL_OUTOF10: 6
PAINLEVEL_OUTOF10: 4
PAINLEVEL_OUTOF10: 3
PAINLEVEL_OUTOF10: 6
PAINLEVEL_OUTOF10: 4
PAINLEVEL_OUTOF10: 4

## 2025-06-12 ASSESSMENT — COGNITIVE AND FUNCTIONAL STATUS - GENERAL
STANDING UP FROM CHAIR USING ARMS: A LITTLE
DRESSING REGULAR UPPER BODY CLOTHING: A LITTLE
PERSONAL GROOMING: A LITTLE
DAILY ACTIVITIY SCORE: 19
CLIMB 3 TO 5 STEPS WITH RAILING: A LITTLE
TOILETING: A LITTLE
DRESSING REGULAR LOWER BODY CLOTHING: A LITTLE
TURNING FROM BACK TO SIDE WHILE IN FLAT BAD: A LITTLE
MOVING TO AND FROM BED TO CHAIR: A LITTLE
MOBILITY SCORE: 18
WALKING IN HOSPITAL ROOM: A LITTLE
MOVING FROM LYING ON BACK TO SITTING ON SIDE OF FLAT BED WITH BEDRAILS: A LITTLE
HELP NEEDED FOR BATHING: A LITTLE

## 2025-06-12 ASSESSMENT — PAIN - FUNCTIONAL ASSESSMENT

## 2025-06-12 ASSESSMENT — PAIN DESCRIPTION - LOCATION: LOCATION: INCISION

## 2025-06-12 ASSESSMENT — PAIN DESCRIPTION - ORIENTATION: ORIENTATION: LEFT

## 2025-06-12 NOTE — ANESTHESIA PROCEDURE NOTES
Central Venous Line:    Date/Time: 6/12/2025 7:47 AM    A central venous line was placed in the OR for the following indication(s): central venous access.  Staffing  Performed: attending and LINDY   Authorized by: Tino Unger MD    Performed by: SARAH Max    Sterility preparation included the following: provider hand hygiene performed prior to central venous catheter insertion, all 5 sterile barriers used (gloves, gown, cap, mask, large sterile drape) during central venous catheter insertion, antiseptic used during central venous catheter insertion and skin prep agent completely dried prior to procedure.  Medical reason for not performing maximal sterile barrier technique: no  The patient was placed in Trendelenburg position.    Left internal jugular vein was prepped.    The site was prepped with Chlorhexidine.  Size: 9 Fr   Length: 11.5  Catheter type: central venous catheter   Number of Lumens: double lumen      During the procedure, the following specific steps were taken: target vein identified, needle advanced into vein and blood aspirated and guidewire advanced into vein.  Seldinger technique used.  Procedure performed using ultrasound guidance.  Sterile gel and probe cover used in ultrasound-guided central venous catheter insertion.    Intravenous verification was obtained by ultrasound, venous blood return and manometry.      Post insertion care included: all ports aspirated, all ports flushed easily, guidewire removed intact, Biopatch applied, line sutured in place and dressing applied.    During the procedure the patient experienced: patient tolerated procedure well with no complications.           images stored in chart    Additional notes:  One attempt. Sutured in. CHG tegaderm and patch

## 2025-06-12 NOTE — ANESTHESIA PREPROCEDURE EVALUATION
"Patient: Juan Jose Roman \"Ferny\"    Procedure Information       Date/Time: 06/12/25 0645    Procedures:       LOBECTOMY, LUNG, THORACOSCOPIC (Left: Chest)      WEDGE RESECTION, LUNG, THORACOSCOPIC (Left: Chest)    Location: [facility] OR 20 / [facility] OR    Surgeons: Shailesh Enamorado MD          76 y/o male scheduled for LOBECTOMY. PMHX includes Anxiety, Depression, CVA(1/2021), left foot drop, neurocardiogenic syncope, chronic pansinusitis, CAD (prior to 2015), AAA without rupture, HTN, HLD, h/o Vtach s/p ablation x2, loop recorder present, Lung nodule, COPD, RAHUL (no CPAP), nocturnal 2L NC, Hypothyroidism, DVT (8 years ago during albation), CKD, GERD, BPH, Spinal stenosis, cervical spondylosis.     ALLERGIES:  Allergies[1]     MEDICAL HISTORY:  Medical History[2]     Relevant Problems   Cardiac   (+) Abdominal aortic aneurysm (AAA) without rupture (3.4 cm suprarenal )   (+) CAD (coronary artery disease)   (+) Essential hypertension   (+) Hyperlipidemia   (+) Ventricular tachycardia (Multi)      Pulmonary   (+) COPD (chronic obstructive pulmonary disease) (Multi)   (+) History of home oxygen therapy      Neuro   (+) Anxiety disorder due to multiple medical problems   (+) Cerebrovascular accident (CVA) due to occlusion of right posterior cerebral artery (Multi)   (+) Major depressive disorder, recurrent, mild   (+) Reactive depression      GI   (+) GERD (gastroesophageal reflux disease)      /Renal   (+) Benign prostatic hyperplasia      Endocrine   (+) Hypothyroidism      Musculoskeletal   (+) Acquired bladder neck obstruction      HEENT   (+) Chronic pansinusitis   (+) Sensorineural hearing loss (SNHL) of both ears      Skin   (+) Eczema, dyshidrotic   (+) Squamous cell carcinoma of upper extremity      Respiratory   (+) Lung nodule      Genitourinary   (+) CKD (chronic kidney disease), stage III (Multi)      ENT   (+) Benign paroxysmal positional vertigo   (+) Cyst of nasopharynx   (+) Deviated septum " "       SURGICAL HISTORY:  Surgical History[3]     MEDICATIONS:  Current Outpatient Medications   Medication Instructions    acetylcysteine (NAC) 600 mg, oral, 2 times daily    albuterol 90 mcg/actuation inhaler 2 puffs, Every 6 hours PRN    aspirin 81 mg capsule 1 tablet, Daily    atorvastatin (LIPITOR) 80 mg, Daily    bismuth subsalicylate (PEPTO BISMOL) 524 mg, oral, Daily PRN    Breztri Aerosphere 160-9-4.8 mcg/actuation HFA aerosol inhaler 2 puffs, inhalation, 2 times daily RT    calcium carbonate (Tums) 200 mg calcium chewable tablet 1 tablet, Daily    chlorhexidine (Hibiclens) 4 % external liquid Topical, Daily PRN, Use for 5 days prior to surgery as body wash, do not use on face or genital region. Leave on 3 min prior to removal    cholecalciferol (Vitamin D-3) 125 MCG (5000 UT) capsule 1 capsule, Daily    clonazePAM (KLONOPIN) 0.25 mg, oral, Daily PRN    cyanocobalamin (VITAMIN B-12) 250 mcg, oral, Daily    famotidine (PEPCID) 40 mg, oral, Every 24 hours    fluticasone (Flonase) 50 mcg/actuation nasal spray 1 spray, Daily    levothyroxine (SYNTHROID, LEVOXYL) 88 mcg, oral, Daily    loratadine (CLARITIN) 10 mg, Nightly    losartan (COZAAR) 50 mg, oral, 2 times daily    melatonin 5 mg tablet,chewable 1 tablet, oral, Nightly    spironolactone (ALDACTONE) 12.5 mg, oral, Daily    tadalafil (CIALIS) 5 mg, oral, Daily        VITALS:      6/2/2025     2:21 PM 5/28/2025    10:19 AM 12/13/2024     7:06 AM   Vitals   Systolic 105 121 120   Diastolic 66 59 76   BP Location  Right arm    Heart Rate 80 85    Temp 36.2 °C (97.2 °F) 36.1 °C (97 °F)    Resp  18    Height 1.803 m (5' 11\") 1.762 m (5' 9.37\") 1.791 m (5' 10.5\")   Weight (lb) 193.3 191.7 194   BMI 26.96 kg/m2 28.01 kg/m2 27.44 kg/m2   BSA (m2) 2.1 m2 2.06 m2 2.09 m2   Visit Report  Report Report       LABS:   BMP   Lab Results   Component Value Date    GLUCOSE 95 06/02/2025    CALCIUM 9.6 06/02/2025     06/02/2025    K 4.1 06/02/2025    CO2 27 06/02/2025 "     06/02/2025    BUN 32 (H) 06/02/2025    CREATININE 1.33 (H) 06/02/2025   , LFT   Lab Results   Component Value Date    ALT 27 06/02/2025    AST 23 06/02/2025    GGT 18 03/31/2021    ALKPHOS 69 06/02/2025    BILITOT 0.6 06/02/2025   , CBC  Lab Results   Component Value Date    WBC 8.0 12/04/2024    HGB 15.0 12/04/2024    HCT 47.2 12/04/2024    MCV 89 12/04/2024     12/04/2024      Lab Results   Component Value Date/Time    PROTIME 12.1 06/02/2025 1429    INR 1.1 06/02/2025 1429    APTT 30 06/02/2025 1429        IMAGES:    Transthoracic Echo (TTE) Complete 06/25/2024  PHYSICIAN INTERPRETATION:  Left Ventricle: The left ventricular systolic function is hyperdynamic, with a Murillo's biplane calculated ejection fraction of 76%. There are no regional wall motion abnormalities. The left ventricular cavity size is normal. The left ventricular septal wall thickness is moderately increased. Spectral Doppler shows an impaired relaxation pattern of left ventricular diastolic filling.  Left Atrium: The left atrium is normal in size.  Right Ventricle: The right ventricle is normal in size. There is normal right ventricular global systolic function.  Right Atrium: The right atrium is normal in size.  Aortic Valve: The aortic valve is probably trileaflet. There is mild aortic valve cusp calcification. There is evidence of mild aortic valve stenosis. The aortic valve dimensionless index is 0.46. There is mild aortic valve regurgitation. The peak instantaneous gradient of the aortic valve is 19.9 mmHg. The mean gradient of the aortic valve is 8.8 mmHg.  Mitral Valve: The mitral valve is normal in structure. There is mild mitral valve regurgitation.  Tricuspid Valve: The tricuspid valve is structurally normal. There is trace to mild tricuspid regurgitation.  Pulmonic Valve: The pulmonic valve is structurally normal. There is no indication of pulmonic valve regurgitation.  Pericardium: There is no pericardial effusion  noted.  Aorta: The aortic root is abnormal. There is upper limits of normal dilatation of the ascending aorta.  In comparison to the previous echocardiogram(s): Compared with study dated 6/1/2022, no significant change.      CONCLUSIONS:  1. The left ventricular systolic function is hyperdynamic, with a Murillo's biplane calculated ejection fraction of 76%.  2. Spectral Doppler shows an impaired relaxation pattern of left ventricular diastolic filling.  3. Moderately increased left ventricular septal thickness.  4. Mild aortic valve stenosis.  5. Mild aortic valve regurgitation.       Stress test 6/1/022      SOCIAL:  Tobacco Use History[4]   Social History     Substance and Sexual Activity   Alcohol Use Yes    Alcohol/week: 6.0 standard drinks of alcohol    Types: 6 Cans of beer per week    Comment: 6 beers weekly when out to dinner      Social History     Substance and Sexual Activity   Drug Use Never      Anesthesia Assessment:    Physical Exam    Airway  Mallampati: II  TM distance: >3 FB  Neck ROM: full  Mouth opening: 3 or more finger widths     Cardiovascular    Dental     (+) lower dentures     Pulmonary    Abdominal            Anesthesia Plan    History of general anesthesia?: yes  History of complications of general anesthesia?: no    ASA 3     general     The patient is not a current smoker.    intravenous induction   Trial extubation is planned.  Anesthetic plan and risks discussed with patient.  Use of blood products discussed with patient who consented to blood products.    Plan discussed with CAA and attending.                [1]   Allergies  Allergen Reactions    Duloxetine Other     Urinary retention    Ipratropium Bromide Other     Urinary problems    Ohtuvayre [Ensifentrine] Other     Increased BP    Tetracycline Other    Tetracycline Hcl Other    Valsartan-Hydrochlorothiazide Unknown    Plavix [Clopidogrel] Hives and Rash   [2]   Past Medical History:  Diagnosis Date    Abnormal results of  pulmonary function studies     Abnormal PFT    Acute bronchitis due to other specified organisms 09/20/2018    Acute bacterial bronchitis    Anxiety     Anxiety disorder, unspecified     Anxiety    CKD (chronic kidney disease)     follows with PCP    COPD (chronic obstructive pulmonary disease) (Multi)     nocturnal oxygen, 2L.    Depression     Diarrhea 06/21/2023    Disorder of arteries and arterioles, unspecified     Carotid arterial disease    Diverticulitis of intestine, part unspecified, without perforation or abscess without bleeding     Diverticulitis    Dizziness and giddiness 04/13/2015    Episodic lightheadedness    Essential (primary) hypertension 11/30/2013    Hypertension    GERD (gastroesophageal reflux disease)     cotnrolled    HL (hearing loss)     Hypokalemia 06/09/2016    Diuretic-induced hypokalemia    Hypothyroidism     Obstructive sleep apnea (adult) (pediatric)     RAHUL (obstructive sleep apnea)    On home oxygen therapy 05/07/2025    at night    Other specified abnormal findings of blood chemistry 06/09/2017    Elevated serum creatinine    Peripheral neuropathy     Personal history of colonic polyps     History of colonic polyps    Personal history of other diseases of the circulatory system     History of coronary artery disease    Personal history of other diseases of the circulatory system     Personal history of coronary atherosclerosis    Personal history of other diseases of the digestive system 12/13/2022    History of gastroesophageal reflux (GERD)    Personal history of other diseases of the respiratory system     History of chronic obstructive lung disease    Personal history of other diseases of the respiratory system 02/26/2016    History of acute bronchitis with bronchospasm    Personal history of other endocrine, nutritional and metabolic disease     History of hypothyroidism    Spinal stenosis    [3]   Past Surgical History:  Procedure Laterality Date    ABLATION OF DYSRHYTHMIC  FOCUS      x2    CARDIAC SURGERY  2018    loop recorder    CORONARY ANGIOPLASTY WITH STENT PLACEMENT      reports prior to     HERNIA REPAIR  2014    Hernia Repair    OTHER SURGICAL HISTORY  2020    Cholecystectomy    TONSILLECTOMY  2018    Tonsillectomy   [4]   Social History  Tobacco Use   Smoking Status Former    Current packs/day: 0.00    Average packs/day: 1.5 packs/day for 40.0 years (60.0 ttl pk-yrs)    Types: Cigarettes    Start date:     Quit date:     Years since quittin.4    Passive exposure: Never   Smokeless Tobacco Never

## 2025-06-12 NOTE — OP NOTE
"LOBECTOMY, LUNG, THORACOSCOPIC (L), WEDGE RESECTION, LUNG, THORACOSCOPIC (L) Operative Note     Date: 2025  OR Location: [facility] OR    Name: Juan Jose Roman \"Ferny\", : 1950, Age: 75 y.o., MRN: [id number], Sex: male    Diagnosis  Pre-op Diagnosis      * Lung nodule [R91.1] Post-op Diagnosis     * Lung nodule [R91.1]     Procedures  Left VATS wedge resection      Surgeons      * Shailesh Enamorado - Primary    Resident/Fellow/Other Assistant:  Surgeons and Role:     * Sammi Fitch MD - Resident - Assisting    Staff:   Circulator: Milvia Nelson Person: Dina Nelson Person: Glenna    Anesthesia Staff: Anesthesiologist: Tino Unger MD  C-AA: SARAH Max  LINDY: Mahogany Sierra    Procedure Summary  Anesthesia: Anesthesia type not filed in the log.  ASA: ASA status not filed in the log.  Estimated Blood Loss: 50mL  Intra-op Medications:   Administrations occurring from 0645 to 1225 on 25:   Medication Name Total Dose   acetaminophen (Ofirmev) injection 1,000 mg   ceFAZolin (Ancef) vial 1 g 2 g   dexAMETHasone (Decadron) 4 mg/mL IV Syringe 2 mL 4 mg   esmolol (Brevibloc) injection 90 mg   fentaNYL (Sublimaze) injection 50 mcg/mL 200 mcg   heparin injection 5,000 units/mL 5,000 Units   LR bolus Cannot be calculated   lidocaine (cardiac) injection 2% prefilled syringe 100 mg   midazolam PF (Versed) injection 1 mg/mL 1 mg   phenylephrine 40 mcg/mL syringe 10 mL 120 mcg   propofol (Diprivan) injection 10 mg/mL 200 mg   rocuronium (ZeMuron) 50 mg/5 mL injection 100 mg              Anesthesia Record               Intraprocedure I/O Totals          Output    Urine 140 mL    Total Output 140 mL          Specimen:   ID Type Source Tests Collected by Time   1 : Left upper lobe wedge resection Tissue LUNG WEDGE RESECTION LEFT (SPECIFY SITE) SURGICAL PATHOLOGY EXAM Shailesh Enamorado MD 2025 0846   A : Left upper lobe wedge Tissue LUNG WEDGE RESECTION LEFT (SPECIFY SITE) AFB CULTURE/SMEAR, " "FUNGAL CULTURE/SMEAR, TISSUE/WOUND CULTURE/SMEAR Shailesh Enamorado MD 6/12/2025 0909                 Drains and/or Catheters: * None in log *      Indications: Juan Jose Roman \"Ferny\" is an 75 y.o. male who is having surgery for Lung nodule [R91.1].     The patient was seen in the preoperative area. The risks, benefits, complications, treatment options, non-operative alternatives, expected recovery and outcomes were discussed with the patient. The possibilities of reaction to medication, pulmonary aspiration, injury to surrounding structures, bleeding, recurrent infection, the need for additional procedures, failure to diagnose a condition, and creating a complication requiring transfusion or operation were discussed with the patient. The patient concurred with the proposed plan, giving informed consent.  The site of surgery was properly noted/marked if necessary per policy. The patient has been actively warmed in preoperative area. Preoperative antibiotics have been ordered and given within 1 hours of incision. Venous thrombosis prophylaxis have been ordered including bilateral sequential compression devices and chemical prophylaxis    Procedure Details: After satisfactory induction of general trach tube anesthesia the patient was placed in the right lateral decubitus position and was prepped and draped in the usual sterile fashion.  A 2 cm incision was made approximately 7 interspace anterior axial line.  A similar incision was made well inferior to the tip of the scapula.  A 4 cm working incision was made approximately third space anterior axial line.  Thoracoscopy of the chest revealed emphysematous overinflated lungs.  The upper lobe was retracted inferior posteriorly.  The nodule was seen attached to the mediastinum and attached to the aorta superiorly and medially.  Using cautery on low settings the tumor was carefully dissected away from the aorta and also carefully dissected away from the mediastinum.  It was " not adjacent to the phrenic nerve.  Eventually with careful dissection the tumor was freed away from the mediastinum and aorta.  Using successive applications of 60 mm in length thick tissue staplers a wide wedge resection was performed staying several centimeters away from the tumor.  The specimen was placed in a bag and sent for frozen section.  Good hemostasis was seen.  Rib blocks were performed using Marcaine.  A 28 straight chest tube with an extra side was placed post apically and was secured to the skin using number Prolene.  The lung inflated well.  The remaining incisions were closed using a deep layer 2-0 Vicryl skin layer of Monocryl.    The presence of adhesions to the aorta and mediastinum significantly increased the workload of this wedge resection.  Evidence of Infection:   Complications:  None; patient tolerated the procedure well.    Disposition: PACU - hemodynamically stable.  Condition: stable             Shailesh Enamorado  Phone Number: [phone]

## 2025-06-12 NOTE — ANESTHESIA PROCEDURE NOTES
Peripheral IV  Date/Time: 6/12/2025 9:21 AM  Inserted by: Tino Unger MD    Placement  Needle size: 20 G  Laterality: left  Location: hand  Site prep: chlorhexidine  Technique: anatomical landmarks  Attempts: 1

## 2025-06-12 NOTE — ANESTHESIA PROCEDURE NOTES
Arterial Line:    Date/Time: 6/12/2025 7:57 AM    Staffing  Performed: SARAH   Authorized by: Tino Unger MD    Performed by: SARAH Max    An arterial line was placed. Procedure performed using surface landmarks.in the OR for the following indication(s): continuous blood pressure monitoring.    A 20 gauge (size), 1 and 3/8 inch (length), Angiocath (type) catheter was placed into the Right radial artery, secured by Tegaderm and and tape,   Seldinger technique used.  Events:  patient tolerated procedure well with no complications.      Additional notes:  Good blood return and waveform. Sterile.

## 2025-06-12 NOTE — ANESTHESIA PROCEDURE NOTES
Peripheral IV  Date/Time: 6/12/2025 7:05 AM  Inserted by: Mahogany Sierra    Placement  Needle size: 20 G  Laterality: right  Location: hand  Site prep: alcohol  Technique: anatomical landmarks  Attempts: 2

## 2025-06-12 NOTE — ANESTHESIA POSTPROCEDURE EVALUATION
"Patient: Juan Jose Roman \"Ferny\"    Procedure Summary       Date: 06/12/25 Room / Location: [facility] OR 20 / [facility] OR    Anesthesia Start: 0733 Anesthesia Stop:     Procedures:       LOBECTOMY, LUNG, THORACOSCOPIC (Left: Chest)      WEDGE RESECTION, LUNG, THORACOSCOPIC (Left: Chest) Diagnosis:       Lung nodule      (Lung nodule [R91.1])    Surgeons: Shailesh Enamorado MD Responsible Provider: Tino Unger MD    Anesthesia Type: general ASA Status: Not recorded            Anesthesia Type: general    Vitals Value Taken Time   /65 06/12/25 09:51   Temp 35.9 06/12/25 09:51   Pulse 76 06/12/25 09:51   Resp 20 06/12/25 09:51   SpO2 98 06/12/25 09:51       Anesthesia Post Evaluation    Patient location during evaluation: PACU  Patient participation: complete - patient participated  Level of consciousness: awake and alert  Pain management: adequate  Airway patency: patent  Cardiovascular status: acceptable  Respiratory status: acceptable and face mask  Hydration status: acceptable  Postoperative Nausea and Vomiting: none        No notable events documented.    "

## 2025-06-12 NOTE — ANESTHESIA PROCEDURE NOTES
Airway  Date/Time: 6/12/2025 7:39 AM  Reason: elective    Airway not difficult    Staffing  Performed: SARAH   Authorized by: Tino Unger MD    Performed by: SARAH Max  Patient location during procedure: OR    Patient Condition  Indications for airway management: anesthesia  Patient position: sniffing  MILS not maintained throughout  Planned trial extubation  Sedation level: deep     Final Airway Details   Preoxygenated: yes  Final airway type: endotracheal airway  Successful airway: ETT - double lumen left  Cuffed: yes   Successful intubation technique: direct laryngoscopy  Adjuncts used in placement: intubating stylet and cricoid pressure  Endotracheal tube insertion site: oral  Blade: Michelle  Blade size: #4  ETT DL size (fr): 39  Cormack-Lehane Classification: grade I - full view of glottis  Placement verified by: bronchoscopy and capnometry   Measured from: gums  Number of attempts at approach: 1    Additional Comments  Lips and teeth in preanesthetic condition. Cloth tie. Placement reconfirmed after turning lateral.

## 2025-06-13 ENCOUNTER — APPOINTMENT (OUTPATIENT)
Dept: PRIMARY CARE | Facility: CLINIC | Age: 75
End: 2025-06-13
Payer: MEDICARE

## 2025-06-13 ENCOUNTER — APPOINTMENT (OUTPATIENT)
Dept: RADIOLOGY | Facility: HOSPITAL | Age: 75
DRG: 165 | End: 2025-06-13
Payer: MEDICARE

## 2025-06-13 VITALS
RESPIRATION RATE: 18 BRPM | BODY MASS INDEX: 26.27 KG/M2 | HEART RATE: 95 BPM | TEMPERATURE: 97.9 F | SYSTOLIC BLOOD PRESSURE: 111 MMHG | HEIGHT: 71 IN | WEIGHT: 187.61 LBS | OXYGEN SATURATION: 92 % | DIASTOLIC BLOOD PRESSURE: 69 MMHG

## 2025-06-13 LAB
ANION GAP SERPL CALC-SCNC: 16 MMOL/L (ref 10–20)
BUN SERPL-MCNC: 25 MG/DL (ref 6–23)
CALCIUM SERPL-MCNC: 9.6 MG/DL (ref 8.6–10.6)
CHLORIDE SERPL-SCNC: 105 MMOL/L (ref 98–107)
CO2 SERPL-SCNC: 23 MMOL/L (ref 21–32)
CREAT SERPL-MCNC: 1.37 MG/DL (ref 0.5–1.3)
EGFRCR SERPLBLD CKD-EPI 2021: 53 ML/MIN/1.73M*2
ERYTHROCYTE [DISTWIDTH] IN BLOOD BY AUTOMATED COUNT: 15 % (ref 11.5–14.5)
FUNGUS SPEC CULT: NORMAL
FUNGUS SPEC FUNGUS STN: NORMAL
GLUCOSE SERPL-MCNC: 103 MG/DL (ref 74–99)
HCT VFR BLD AUTO: 45.5 % (ref 41–52)
HGB BLD-MCNC: 14.5 G/DL (ref 13.5–17.5)
MAGNESIUM SERPL-MCNC: 1.87 MG/DL (ref 1.6–2.4)
MCH RBC QN AUTO: 29.7 PG (ref 26–34)
MCHC RBC AUTO-ENTMCNC: 31.9 G/DL (ref 32–36)
MCV RBC AUTO: 93 FL (ref 80–100)
NRBC BLD-RTO: 0 /100 WBCS (ref 0–0)
PLATELET # BLD AUTO: 240 X10*3/UL (ref 150–450)
POTASSIUM SERPL-SCNC: 3.7 MMOL/L (ref 3.5–5.3)
RBC # BLD AUTO: 4.89 X10*6/UL (ref 4.5–5.9)
SODIUM SERPL-SCNC: 140 MMOL/L (ref 136–145)
WBC # BLD AUTO: 12 X10*3/UL (ref 4.4–11.3)

## 2025-06-13 PROCEDURE — 2500000004 HC RX 250 GENERAL PHARMACY W/ HCPCS (ALT 636 FOR OP/ED): Performed by: STUDENT IN AN ORGANIZED HEALTH CARE EDUCATION/TRAINING PROGRAM

## 2025-06-13 PROCEDURE — 2500000002 HC RX 250 W HCPCS SELF ADMINISTERED DRUGS (ALT 637 FOR MEDICARE OP, ALT 636 FOR OP/ED): Performed by: NURSE PRACTITIONER

## 2025-06-13 PROCEDURE — 71045 X-RAY EXAM CHEST 1 VIEW: CPT

## 2025-06-13 PROCEDURE — 85027 COMPLETE CBC AUTOMATED: CPT | Performed by: STUDENT IN AN ORGANIZED HEALTH CARE EDUCATION/TRAINING PROGRAM

## 2025-06-13 PROCEDURE — 71045 X-RAY EXAM CHEST 1 VIEW: CPT | Performed by: RADIOLOGY

## 2025-06-13 PROCEDURE — 2500000004 HC RX 250 GENERAL PHARMACY W/ HCPCS (ALT 636 FOR OP/ED): Performed by: NURSE PRACTITIONER

## 2025-06-13 PROCEDURE — 2500000005 HC RX 250 GENERAL PHARMACY W/O HCPCS: Performed by: PHYSICIAN ASSISTANT

## 2025-06-13 PROCEDURE — 36415 COLL VENOUS BLD VENIPUNCTURE: CPT | Performed by: STUDENT IN AN ORGANIZED HEALTH CARE EDUCATION/TRAINING PROGRAM

## 2025-06-13 PROCEDURE — 83735 ASSAY OF MAGNESIUM: CPT | Performed by: STUDENT IN AN ORGANIZED HEALTH CARE EDUCATION/TRAINING PROGRAM

## 2025-06-13 PROCEDURE — 80048 BASIC METABOLIC PNL TOTAL CA: CPT | Performed by: STUDENT IN AN ORGANIZED HEALTH CARE EDUCATION/TRAINING PROGRAM

## 2025-06-13 PROCEDURE — 2500000001 HC RX 250 WO HCPCS SELF ADMINISTERED DRUGS (ALT 637 FOR MEDICARE OP): Performed by: STUDENT IN AN ORGANIZED HEALTH CARE EDUCATION/TRAINING PROGRAM

## 2025-06-13 PROCEDURE — 2500000002 HC RX 250 W HCPCS SELF ADMINISTERED DRUGS (ALT 637 FOR MEDICARE OP, ALT 636 FOR OP/ED): Performed by: STUDENT IN AN ORGANIZED HEALTH CARE EDUCATION/TRAINING PROGRAM

## 2025-06-13 RX ORDER — METHOCARBAMOL 500 MG/1
500 TABLET, FILM COATED ORAL 3 TIMES DAILY
Qty: 15 TABLET | Refills: 0 | Status: SHIPPED | OUTPATIENT
Start: 2025-06-13

## 2025-06-13 RX ORDER — LIDOCAINE 560 MG/1
1 PATCH PERCUTANEOUS; TOPICAL; TRANSDERMAL DAILY
Start: 2025-06-13

## 2025-06-13 RX ORDER — POTASSIUM CHLORIDE 20 MEQ/1
20 TABLET, EXTENDED RELEASE ORAL ONCE
Status: COMPLETED | OUTPATIENT
Start: 2025-06-13 | End: 2025-06-13

## 2025-06-13 RX ORDER — LANOLIN ALCOHOL/MO/W.PET/CERES
400 CREAM (GRAM) TOPICAL ONCE
Status: COMPLETED | OUTPATIENT
Start: 2025-06-13 | End: 2025-06-13

## 2025-06-13 RX ORDER — OXYCODONE HYDROCHLORIDE 5 MG/1
5 TABLET ORAL EVERY 6 HOURS PRN
Qty: 10 TABLET | Refills: 0 | Status: SHIPPED | OUTPATIENT
Start: 2025-06-13

## 2025-06-13 RX ADMIN — FLUTICASONE PROPIONATE 1 SPRAY: 50 SPRAY, METERED NASAL at 08:24

## 2025-06-13 RX ADMIN — CALCIUM CARBONATE 1 TABLET: 500 TABLET, CHEWABLE ORAL at 08:26

## 2025-06-13 RX ADMIN — ACETAMINOPHEN 650 MG: 325 TABLET ORAL at 13:09

## 2025-06-13 RX ADMIN — LOSARTAN POTASSIUM 25 MG: 25 TABLET, FILM COATED ORAL at 08:26

## 2025-06-13 RX ADMIN — ATORVASTATIN CALCIUM 80 MG: 80 TABLET, FILM COATED ORAL at 08:26

## 2025-06-13 RX ADMIN — CEFAZOLIN SODIUM 2 G: 2 INJECTION, SOLUTION INTRAVENOUS at 01:42

## 2025-06-13 RX ADMIN — OXYCODONE HYDROCHLORIDE 10 MG: 10 TABLET ORAL at 08:24

## 2025-06-13 RX ADMIN — LEVOTHYROXINE SODIUM 88 MCG: 0.09 TABLET ORAL at 07:23

## 2025-06-13 RX ADMIN — CETIRIZINE HYDROCHLORIDE 10 MG: 10 TABLET, FILM COATED ORAL at 08:26

## 2025-06-13 RX ADMIN — METHOCARBAMOL 500 MG: 500 TABLET ORAL at 07:23

## 2025-06-13 RX ADMIN — MAGNESIUM OXIDE TAB 400 MG (241.3 MG ELEMENTAL MG) 1 TABLET: 400 (241.3 MG) TAB at 10:16

## 2025-06-13 RX ADMIN — ASPIRIN 81 MG: 81 TABLET, COATED ORAL at 08:26

## 2025-06-13 RX ADMIN — OXYCODONE 5 MG: 5 TABLET ORAL at 13:22

## 2025-06-13 RX ADMIN — HYDROMORPHONE HYDROCHLORIDE 0.2 MG: 1 INJECTION, SOLUTION INTRAMUSCULAR; INTRAVENOUS; SUBCUTANEOUS at 01:42

## 2025-06-13 RX ADMIN — HEPARIN SODIUM 5000 UNITS: 5000 INJECTION INTRAVENOUS; SUBCUTANEOUS at 08:27

## 2025-06-13 RX ADMIN — LIDOCAINE 4% 1 PATCH: 40 PATCH TOPICAL at 13:09

## 2025-06-13 RX ADMIN — SPIRONOLACTONE 12.5 MG: 25 TABLET, FILM COATED ORAL at 08:26

## 2025-06-13 RX ADMIN — POTASSIUM CHLORIDE 20 MEQ: 1500 TABLET, EXTENDED RELEASE ORAL at 10:16

## 2025-06-13 ASSESSMENT — COGNITIVE AND FUNCTIONAL STATUS - GENERAL
TURNING FROM BACK TO SIDE WHILE IN FLAT BAD: A LITTLE
WALKING IN HOSPITAL ROOM: A LITTLE
HELP NEEDED FOR BATHING: A LITTLE
TOILETING: A LITTLE
CLIMB 3 TO 5 STEPS WITH RAILING: A LITTLE
DRESSING REGULAR LOWER BODY CLOTHING: A LITTLE
DRESSING REGULAR UPPER BODY CLOTHING: A LITTLE
STANDING UP FROM CHAIR USING ARMS: A LITTLE
MOVING TO AND FROM BED TO CHAIR: A LITTLE
PERSONAL GROOMING: A LITTLE
MOBILITY SCORE: 18
DAILY ACTIVITIY SCORE: 19
MOVING FROM LYING ON BACK TO SITTING ON SIDE OF FLAT BED WITH BEDRAILS: A LITTLE

## 2025-06-13 ASSESSMENT — PAIN - FUNCTIONAL ASSESSMENT
PAIN_FUNCTIONAL_ASSESSMENT: 0-10

## 2025-06-13 ASSESSMENT — ACTIVITIES OF DAILY LIVING (ADL): LACK_OF_TRANSPORTATION: NO

## 2025-06-13 ASSESSMENT — PAIN DESCRIPTION - DESCRIPTORS
DESCRIPTORS: ACHING;SORE
DESCRIPTORS: ACHING

## 2025-06-13 ASSESSMENT — PAIN SCALES - GENERAL
PAINLEVEL_OUTOF10: 7
PAINLEVEL_OUTOF10: 5 - MODERATE PAIN
PAINLEVEL_OUTOF10: 2

## 2025-06-13 NOTE — CARE PLAN
Problem: Skin  Goal: Decreased wound size/increased tissue granulation at next dressing change  Outcome: Progressing  Goal: Participates in plan/prevention/treatment measures  Outcome: Progressing  Goal: Prevent/manage excess moisture  Outcome: Progressing  Goal: Prevent/minimize sheer/friction injuries  Outcome: Progressing  Goal: Promote/optimize nutrition  Outcome: Progressing  Goal: Promote skin healing  Outcome: Progressing     Problem: Pain - Adult  Goal: Verbalizes/displays adequate comfort level or baseline comfort level  Outcome: Progressing     Problem: Safety - Adult  Goal: Free from fall injury  Outcome: Progressing     Problem: Discharge Planning  Goal: Discharge to home or other facility with appropriate resources  Outcome: Progressing     Problem: Chronic Conditions and Co-morbidities  Goal: Patient's chronic conditions and co-morbidity symptoms are monitored and maintained or improved  Outcome: Progressing     Problem: Nutrition  Goal: Nutrient intake appropriate for maintaining nutritional needs  Outcome: Progressing

## 2025-06-13 NOTE — NURSING NOTE
On RA sitting pt was 92-95% and walking pt did drop to 89% on RA without feeling SOB    Patient agreeable to not needing home O2 during the day, and will resume with his regular O2 in the evening. No additional O2 needed.

## 2025-06-13 NOTE — DISCHARGE SUMMARY
Discharge Diagnosis  Lung nodule    Issues Requiring Follow-Up  Left lung lobectomy with Left wedge resection final pathology     Test Results Pending At Discharge  Pending Labs       Order Current Status    AFB Culture/Smear In process    Surgical Pathology Exam In process    Fungal Culture/Smear Preliminary result    Tissue/Wound Culture/Smear Preliminary result          Hospital Course    6/13/25 - Left VATS wedge resection with Left CT placement  6/13/25 -  patient had some issue with pain control overnight and didn't sleep well. A&O x3; no nausea or vomiting, good appetite and voiding without issues.     The patient has had an uncomplicated surgical course.  his chest tubes were removed on post operative day 1 and post pull imaging was without evidence of clinically significant  pneumothorax.     At the time of discharge, his pain was controlled on an oral pain regimen, He was breathing comfortably on room air.  He was ambulating independently.  He was tolerating a regular diet without nausea. He was voiding regularly.      The patient was educated on post operative activity restrictions, dietary guidelines, and pain management. He will follow up with Dr. Enamorado in the outpatient setting in two weeks with a CXR just prior to this visit. The patient has been instructed to add an OTC stool softeners or laxative while taking oral analgesia.  Deep breathing, coughing, and walking at home encouraged. All questions have been answered and concerns addressed until there were none.     Visit Vitals  /56 (BP Location: Right arm, Patient Position: Sitting)   Pulse 95   Temp 36.6 °C (97.9 °F) (Temporal)   Resp 18     Vitals:    06/12/25 0656   Weight: 85.1 kg (187 lb 9.8 oz)       Immunization History   Administered Date(s) Administered    Flu vaccine, trivalent, preservative free, HIGH-DOSE, age 65y+ (Fluzone) 12/21/2015, 10/06/2017, 08/30/2018, 10/08/2019, 09/02/2020, 09/26/2022, 09/23/2024    Flu vaccine,  trivalent, preservative free, age 6 months and greater (Fluarix/Fluzone/Flulaval) 10/07/2014    Influenza, Seasonal, Quadrivalent, Adjuvanted 10/17/2021, 09/12/2023    Influenza, Unspecified 10/07/2009, 10/01/2010, 09/13/2011, 11/22/2013, 10/01/2016    Influenza, injectable, quadrivalent 10/17/2021    Moderna COVID-19 vaccine, bivalent, blue cap/gray label *Check age/dose* 10/31/2022    Moderna SARS-CoV-2 Vaccination 02/10/2021, 03/10/2021, 11/03/2021, 06/01/2022    Pfizer COVID-19 vaccine, 12 years and older, (30mcg/0.3mL) (Comirnaty) 12/27/2023, 10/15/2024    Pneumococcal conjugate vaccine, 13-valent (PREVNAR 13) 12/21/2015    Pneumococcal polysaccharide vaccine, 23-valent, age 2 years and older (PNEUMOVAX 23) 05/29/2014    Pneumococcal, Unspecified 12/03/2015    RSV, 60 Years And Older (AREXVY) 09/12/2023    Tdap vaccine, age 7 year and older (BOOSTRIX, ADACEL) 04/07/2010, 10/15/2024    Zoster vaccine, recombinant, adult (SHINGRIX) 06/11/2019, 10/14/2019, 02/25/2020, 11/24/2023, 03/06/2024       Results  6/13/25 CXR - Interval removal of left apical chest tube with subsequent  development of trace left pneumothorax    Pertinent Physical Exam At Time of Discharge  Physical Exam  HEENT: Normocephalic and atraumatic.   NECK: Supple. Trach midline. No JVD.   CHEST: Breathing comfortably on RA, Lungs are CTA,  Chest tube removed this am without issue  HEART: Regular rate and rhythm. No murmurs, NSR at 76 per tele review.   ABDOMEN: Soft, flat, nontender. No BM today  : Urinating adequately  NEUROLOGIC: Alert and oriented. Grossly intact.   EXTREMITIES: Moves all extremities equally.  Pedal pulses are palpable. No lower extremity edema. No calf tenderness.      Home Medications     Medication List      ASK your doctor about these medications     albuterol 90 mcg/actuation inhaler   aspirin 81 mg capsule   atorvastatin 80 mg tablet; Commonly known as: Lipitor   bismuth subsalicylate 262 mg chewable tablet; Commonly  known as: Pepto   Bismol   Breztri Aerosphere 160-9-4.8 mcg/actuation HFA aerosol inhaler; Generic   drug: budesonide-glycopyr-formoterol   calcium carbonate 500 mg (200 mg elemental) chewable tablet; Commonly   known as: Tums   chlorhexidine 4 % external liquid; Commonly known as: Hibiclens; Apply   topically once daily as needed (preop care). Use for 5 days prior to   surgery as body wash, do not use on face or genital region. Leave on 3 min   prior to removal   cholecalciferol 125 mcg (5,000 units) capsule; Commonly known as:   Vitamin D-3   clonazePAM 0.5 mg tablet; Commonly known as: KlonoPIN   cyanocobalamin 250 mcg tablet; Commonly known as: Vitamin B-12   famotidine 40 mg tablet; Commonly known as: Pepcid; TAKE 1 TABLET (40   MG) BY MOUTH ONCE EVERY 24 HOURS.   fluticasone 50 mcg/actuation nasal spray; Commonly known as: Flonase   levothyroxine 88 mcg tablet; Commonly known as: Synthroid, Levoxyl; TAKE   1 TABLET BY MOUTH ONCE DAILY.   loratadine 10 mg tablet; Commonly known as: Claritin   losartan 50 mg tablet; Commonly known as: Cozaar; TAKE 1 TABLET BY MOUTH   TWICE A DAY   melatonin 5 mg tablet,chewable    mg capsule; Generic drug: acetylcysteine   spironolactone 25 mg tablet; Commonly known as: Aldactone; Take 0.5   tablets (12.5 mg) by mouth once daily.   tadalafil 5 mg tablet; Commonly known as: Cialis; Take 1 tablet (5 mg)   by mouth once daily.       Outpatient Follow-Up  Future Appointments   Date Time Provider Department [place]   6/17/2025  8:30 AM [id number]   6/17/2025  9:20 AM DAVE Garland [id number]   7/10/2025 11:30 AM Kellee Chowdhury MD [id number] [facility]   Time spent with discharge was >30 minutes and included explanation of discharge instructions, arranging homegoing prescriptions, checking OARRS, supplies and follow-up, and creating the discharge summary of the patient's hospialization.      DAVE Brvao    Edits made where  applicable, agree with above documentation.    Leora Bynum, APRN-CNP  Department of Thoracic and Esophageal Surgery  Thoracic Surgery pager [phone]      no

## 2025-06-13 NOTE — CARE PLAN
The patient's goals for the shift include  going home     The clinical goals for the shift include Pt will have pain controlled this shift

## 2025-06-13 NOTE — PROGRESS NOTES
06/13/25 1132   Discharge Planning   Living Arrangements Spouse/significant other   Support Systems Spouse/significant other   Type of Residence Private residence   Number of Stairs to Enter Residence 1   Number of Stairs Within Residence 14  (Have a chair lift)   Do you have animals or pets at home? No   Who is requesting discharge planning? Provider   Home or Post Acute Services None;In home services   Type of Home Care Services Home health aide  (In the process of establing care with the [facility] for a HHA.)   Expected Discharge Disposition Home   Does the patient need discharge transport arranged? No   Financial Resource Strain   How hard is it for you to pay for the very basics like food, housing, medical care, and heating? Not hard   Housing Stability   In the last 12 months, was there a time when you were not able to pay the mortgage or rent on time? N   In the past 12 months, how many times have you moved where you were living? 0   At any time in the past 12 months, were you homeless or living in a shelter (including now)? N   Transportation Needs   In the past 12 months, has lack of transportation kept you from medical appointments or from getting medications? no   In the past 12 months, has lack of transportation kept you from meetings, work, or from getting things needed for daily living? No     Met with patient /spouse to introduce myself, role and discuss discharge planning.  Patient lives with his spouse.  Patient is independent in all adl's.  Patient denies any recent falls.  Patient ambulates with a cane, but has a walker at home if required.  Patient denies active home care or home care needs.  Patient feels safe at home and denies any issues with making follow up appointments or getting his medications.  Patient/wife have expressed no questions and no social work concerns.  Family will transport home.  Insurance:  Medicare A/B  [employer]  PCP:  Kellee Chowdhury  Pharmacy:  [facility]  DME:  N/A  DM:   N/A  HD:  N/A    6/13/2025@ 13:05.  Patient is medically ready for discharge.  Patient will discharge home with no additional home going needs.

## 2025-06-14 LAB
ACID FAST STN SPEC: NORMAL
BACTERIA SPEC CULT: NORMAL
GRAM STN SPEC: NORMAL
GRAM STN SPEC: NORMAL
MYCOBACTERIUM SPEC CULT: NORMAL

## 2025-06-17 ENCOUNTER — HOSPITAL ENCOUNTER (OUTPATIENT)
Dept: CARDIOLOGY | Facility: CLINIC | Age: 75
Discharge: HOME | End: 2025-06-17
Payer: MEDICARE

## 2025-06-17 ENCOUNTER — OFFICE VISIT (OUTPATIENT)
Dept: CARDIOLOGY | Facility: CLINIC | Age: 75
End: 2025-06-17
Payer: MEDICARE

## 2025-06-17 VITALS
BODY MASS INDEX: 26.6 KG/M2 | SYSTOLIC BLOOD PRESSURE: 120 MMHG | HEART RATE: 88 BPM | WEIGHT: 190 LBS | HEIGHT: 71 IN | DIASTOLIC BLOOD PRESSURE: 59 MMHG

## 2025-06-17 DIAGNOSIS — E78.5 HYPERLIPIDEMIA, UNSPECIFIED HYPERLIPIDEMIA TYPE: ICD-10-CM

## 2025-06-17 DIAGNOSIS — I25.119 CORONARY ARTERY DISEASE INVOLVING NATIVE CORONARY ARTERY OF NATIVE HEART WITH ANGINA PECTORIS: ICD-10-CM

## 2025-06-17 DIAGNOSIS — I70.0 ATHEROSCLEROSIS OF AORTA: ICD-10-CM

## 2025-06-17 DIAGNOSIS — I49.3 PVC (PREMATURE VENTRICULAR CONTRACTION): ICD-10-CM

## 2025-06-17 DIAGNOSIS — I71.21 ANEURYSM OF ASCENDING AORTA WITHOUT RUPTURE: ICD-10-CM

## 2025-06-17 DIAGNOSIS — I10 ESSENTIAL HYPERTENSION: ICD-10-CM

## 2025-06-17 DIAGNOSIS — I73.9 PAD (PERIPHERAL ARTERY DISEASE): ICD-10-CM

## 2025-06-17 DIAGNOSIS — J44.9 CHRONIC OBSTRUCTIVE PULMONARY DISEASE, UNSPECIFIED COPD TYPE (MULTI): ICD-10-CM

## 2025-06-17 DIAGNOSIS — I63.9 CEREBRAL INFARCTION, UNSPECIFIED MECHANISM (MULTI): ICD-10-CM

## 2025-06-17 DIAGNOSIS — I35.2 NONRHEUMATIC AORTIC (VALVE) STENOSIS WITH INSUFFICIENCY: ICD-10-CM

## 2025-06-17 DIAGNOSIS — I35.0 NONRHEUMATIC AORTIC VALVE STENOSIS: ICD-10-CM

## 2025-06-17 DIAGNOSIS — I25.119 CORONARY ARTERY DISEASE INVOLVING NATIVE CORONARY ARTERY OF NATIVE HEART WITH ANGINA PECTORIS: Primary | ICD-10-CM

## 2025-06-17 LAB
FUNGUS SPEC CULT: NORMAL
FUNGUS SPEC FUNGUS STN: NORMAL

## 2025-06-17 PROCEDURE — 99212 OFFICE O/P EST SF 10 MIN: CPT

## 2025-06-17 PROCEDURE — 3074F SYST BP LT 130 MM HG: CPT | Performed by: NURSE PRACTITIONER

## 2025-06-17 PROCEDURE — 1160F RVW MEDS BY RX/DR IN RCRD: CPT | Performed by: NURSE PRACTITIONER

## 2025-06-17 PROCEDURE — 93306 TTE W/DOPPLER COMPLETE: CPT | Performed by: STUDENT IN AN ORGANIZED HEALTH CARE EDUCATION/TRAINING PROGRAM

## 2025-06-17 PROCEDURE — 99215 OFFICE O/P EST HI 40 MIN: CPT | Performed by: NURSE PRACTITIONER

## 2025-06-17 PROCEDURE — 1159F MED LIST DOCD IN RCRD: CPT | Performed by: NURSE PRACTITIONER

## 2025-06-17 PROCEDURE — 3078F DIAST BP <80 MM HG: CPT | Performed by: NURSE PRACTITIONER

## 2025-06-17 PROCEDURE — 1111F DSCHRG MED/CURRENT MED MERGE: CPT | Performed by: NURSE PRACTITIONER

## 2025-06-17 PROCEDURE — 1036F TOBACCO NON-USER: CPT | Performed by: NURSE PRACTITIONER

## 2025-06-17 PROCEDURE — 93306 TTE W/DOPPLER COMPLETE: CPT

## 2025-06-17 RX ORDER — TRIAMCINOLONE ACETONIDE 1 MG/G
CREAM TOPICAL
COMMUNITY
Start: 2025-02-26

## 2025-06-17 RX ORDER — FAMOTIDINE 40 MG/1
TABLET, FILM COATED ORAL
COMMUNITY
End: 2025-06-17 | Stop reason: SDUPTHER

## 2025-06-17 RX ORDER — FLUTICASONE FUROATE 100 UG/1
POWDER RESPIRATORY (INHALATION)
COMMUNITY
Start: 2025-04-17

## 2025-06-17 RX ORDER — CHLORHEXIDINE GLUCONATE ORAL RINSE 1.2 MG/ML
SOLUTION DENTAL
COMMUNITY
Start: 2025-06-02

## 2025-06-17 RX ORDER — DULOXETIN HYDROCHLORIDE 30 MG/1
30 CAPSULE, DELAYED RELEASE ORAL EVERY MORNING
COMMUNITY
Start: 2025-05-05 | End: 2025-06-17 | Stop reason: SINTOL

## 2025-06-17 RX ORDER — PREDNISOLONE ACETATE 10 MG/ML
SUSPENSION/ DROPS OPHTHALMIC
COMMUNITY
Start: 2024-09-04 | End: 2025-06-17 | Stop reason: ALTCHOICE

## 2025-06-17 RX ORDER — FLUTICASONE FUROATE, UMECLIDINIUM BROMIDE AND VILANTEROL TRIFENATATE 100; 62.5; 25 UG/1; UG/1; UG/1
1 POWDER RESPIRATORY (INHALATION)
COMMUNITY
Start: 2025-01-15

## 2025-06-17 RX ORDER — MAGNESIUM L-LACTATE 84 MG
84 TABLET, EXTENDED RELEASE ORAL DAILY
COMMUNITY
Start: 2025-06-17

## 2025-06-17 RX ORDER — PSYLLIUM HUSK 0.4 G
CAPSULE ORAL
COMMUNITY
Start: 2025-06-17

## 2025-06-17 RX ORDER — FAMOTIDINE 40 MG/1
40 TABLET, FILM COATED ORAL EVERY 24 HOURS
COMMUNITY
Start: 2025-06-17

## 2025-06-17 RX ORDER — MULTIVIT WITH MINERALS/HERBS
1 TABLET ORAL DAILY
COMMUNITY
Start: 2025-06-17

## 2025-06-17 RX ORDER — CARBIDOPA AND LEVODOPA 25; 100 MG/1; MG/1
TABLET, EXTENDED RELEASE ORAL
COMMUNITY
Start: 2025-06-10

## 2025-06-17 NOTE — PROGRESS NOTES
"Chief Complaint:   CAD     History Of Present Illness:    Juan Jose Roman \"Ferny\" is a 75 y.o. male here for follow-up of coronary artery disease.  Juan Jose \"Wanda" underwent successful PCI 2015.  The patient is tolerating guideline-directed medical therapy with antiplatelet and statin medication and is compliant.  The patient exercises regularly and follows a heart healthy diet.  The patient has been well since their last office appointment and is not having any anginal symptoms or dyspnea on exertion.      Underwent lobectomy 6/12/25.     Prior to surgery was walking 30 minutes a day without complaint. Ankle weights with 90 reps of different exercises.     Dizziness/ lightheadedness started 8/17/2024. Presented to hospital with feeling dizzy/lightheaded and near syncopal. Had been feeling off balance prior to presentation. Became dizzy and left lower extremity gave out. Was not syncopal. Cr noted to be mildly elevated upon admit. Given IV hydration. Nothing untoward noted CT of head. Troponin -2.     Continues to get off balance.     Denies palpitations.     No sonographic evidence of abdominal aortic aneurysm-4/12/23  Frequent PVC's/RFA 6/10/15 [Severe RCA - EMILY] - 4/24/2015  PVCs [RFA] - 12/20/2019  Cardiac Calcium Score (Total 722:) - 1/2008    MPI (Negative for ischemia. Moderate LV thickness. Mild AVR) - 6/1/2022    Family Hx  Brother aortic dissection.     Allergies:  Duloxetine, Ipratropium bromide, Ohtuvayre [ensifentrine], Tetracycline, Tetracycline hcl, Valsartan-hydrochlorothiazide, and Plavix [clopidogrel]    Review of Systems  All pertinent systems have been reviewed and are negative except for what is stated in the history of present illness.    All other systems have been reviewed and are negative and noncontributory to this patient's current ailments.     Visit Vitals  /59   Pulse 88   Ht 1.803 m (5' 11\")   Wt 86.2 kg (190 lb)   BMI 26.50 kg/m²   Smoking Status Former   BSA 2.08 m²       Objective "   Vitals reviewed.   Constitutional:       Appearance: Healthy appearance. Not in distress.   Eyes:      Conjunctiva/sclera: Conjunctivae normal.   Neck:      Vascular: No JVR. JVD normal.   Pulmonary:      Effort: Pulmonary effort is normal.      Breath sounds: Normal breath sounds. No wheezing. No rhonchi. No rales.   Chest:      Chest wall: Not tender to palpatation.   Cardiovascular:      PMI at left midclavicular line. Normal rate. Regular rhythm. Normal S1. Normal S2.       Murmurs: There is no murmur.      No gallop.  No click. No rub.   Edema:     Peripheral edema absent.   Abdominal:      General: Bowel sounds are normal.      Palpations: Abdomen is soft.      Tenderness: There is no abdominal tenderness.   Musculoskeletal: Normal range of motion.         General: No tenderness. Skin:     General: Skin is warm and dry.   Neurological:      General: No focal deficit present.      Mental Status: Alert and oriented to person, place and time.   Psychiatric:         Attention and Perception: Attention normal.         Mood and Affect: Mood normal.       Last Labs:  CBC -  Lab Results   Component Value Date    WBC 12.0 (H) 06/13/2025    HGB 14.5 06/13/2025    HCT 45.5 06/13/2025    MCV 93 06/13/2025     06/13/2025       CMP -  Lab Results   Component Value Date    CALCIUM 9.6 06/13/2025    PHOS 4.4 03/31/2021    PROT 6.8 06/02/2025    ALBUMIN 4.2 06/02/2025    AST 23 06/02/2025    ALT 27 06/02/2025    ALKPHOS 69 06/02/2025    BILITOT 0.6 06/02/2025    BUN 25 (H) 06/13/2025    CREATININE 1.37 (H) 06/13/2025       LIPID PANEL -   Lab Results   Component Value Date    CHOL 141 12/18/2024    TRIG 91 12/18/2024    HDL 58.8 12/18/2024    CHHDL 2.4 12/18/2024    LDLF 69 04/04/2023    VLDL 18 12/18/2024    NHDL 82 12/18/2024       RENAL FUNCTION PANEL -   Lab Results   Component Value Date    GLUCOSE 103 (H) 06/13/2025     06/13/2025    K 3.7 06/13/2025     06/13/2025    CO2 23 06/13/2025    ANIONGAP 16  06/13/2025    BUN 25 (H) 06/13/2025    CREATININE 1.37 (H) 06/13/2025    GFRMALE 54 (A) 04/04/2023    CALCIUM 9.6 06/13/2025    PHOS 4.4 03/31/2021    ALBUMIN 4.2 06/02/2025        Lab Results   Component Value Date    HGBA1C 5.5 12/18/2024         Assessment/Plan   Diagnoses and all orders for this visit:  Coronary artery disease involving native coronary artery of native heart with angina pectoris (CMS-[facility])  - denies chest pain and SOB  - continue atorvastatin/asa  PVC (premature ventricular contraction)  - well controlled   - loop recorder  Essential hypertension  - well controlled   - continue losartan, spirolactone   Hyperlipidemia, unspecified hyperlipidemia type  - continue atorvastatin  - lipids well controlled   Cerebral infarction, unspecified mechanism (Multi)  - cpntinue atorvastatin/asa  - no further CVA symptoms   PAD (peripheral artery disease) (CMS-HCC)  - followed with vascular, no intervention warranted   Atherosclerosis of aorta (CMS-HCC)/TAA  - noted on previous US  - following with vascular   Chronic obstructive pulmonary disease, unspecified COPD type (Multi)  - following with pulmonology   Aortic Stenosis   - mild     Follow up 6 months    Outpatient Medications:  Current Outpatient Medications   Medication Instructions    acetylcysteine (NAC) 600 mg, 2 times daily    albuterol 90 mcg/actuation inhaler 2 puffs, Every 6 hours PRN    Arnuity Ellipta 100 mcg/actuation inhaler INHALE 1 PUFF INTO THE LUNGS EVERY DAY FOR 90 DAYS    aspirin 81 mg capsule 1 tablet, Daily    atorvastatin (LIPITOR) 80 mg, Daily    Breztri Aerosphere 160-9-4.8 mcg/actuation HFA aerosol inhaler 2 puffs, 2 times daily RT    calcium carbonate (Tums) 200 mg calcium chewable tablet 1 tablet, Daily    carbidopa-levodopa (Sinemet CR)  mg ER tablet TAKE 1 TABLET ORALLY ONCE AT SUPPER AND ONCE AT BEDTIME 30 DAYS    chlorhexidine (Peridex) 0.12 % solution PLEASE SEE ATTACHED FOR DETAILED DIRECTIONS    cholecalciferol  (Vitamin D-3) 125 MCG (5000 UT) capsule 1 capsule, Daily    clonazePAM (KLONOPIN) 0.25 mg, Daily PRN    cyanocobalamin (VITAMIN B-12) 250 mcg, Daily    famotidine (PEPCID) 40 mg, Every 24 hours    fluticasone (Flonase) 50 mcg/actuation nasal spray 1 spray, Daily    levothyroxine (SYNTHROID, LEVOXYL) 88 mcg, oral, Daily    lidocaine 4 % patch 1 patch, transdermal, Daily, Remove & discard patch within 12 hours or as directed by MD.    loratadine (CLARITIN) 10 mg, Nightly    losartan (COZAAR) 50 mg, oral, 2 times daily    magnesium lactate CR (MAGTAB) 84 mg, oral, Daily    melatonin 5 mg tablet,chewable 1 tablet, Nightly    methocarbamol (ROBAXIN) 500 mg, oral, 3 times daily    oxyCODONE (ROXICODONE) 5 mg, oral, Every 6 hours PRN    spironolactone (ALDACTONE) 12.5 mg, oral, Daily    tadalafil (CIALIS) 5 mg, oral, Daily    Trelegy Ellipta 100-62.5-25 mcg blister with device 1 puff, Daily RT    triamcinolone (Kenalog) 0.1 % cream APPLY TO AFFECTED AREAS TWICE DAILY X 2 WEEKS AT A TIME, THEN AS NEEDED FOR FLARES    turmeric/turmeric ext/pepr ext (turmeric-turmeric ext-pepper) 500-5 mg capsule oral    vitamin B complex (B Complex-Vitamin B12) tablet 1 tablet, oral, Daily, 2500 mg daily dose       Exclusive of any other services or procedures performed, I, Caro ACOSTA, spent 40 minutes in duration for this visit today.  This time consisted of chart review, obtaining history, and/or performing the exam as documented above, as well as, documenting clinical information for the encounter in the electronic record. In addition to the history, testing, notes, and labs I have noted above; I have reviewed Hospital records 6/12/25  Thoracic surgery note 5/28/25, labs 12/18/24

## 2025-06-19 LAB
ACID FAST STN SPEC: NORMAL
AORTIC VALVE MEAN GRADIENT: 13 MMHG
AORTIC VALVE PEAK VELOCITY: 2.67 M/S
AV PEAK GRADIENT: 29 MMHG
AVA (PEAK VEL): 1.39 CM2
AVA (VTI): 1.65 CM2
EJECTION FRACTION APICAL 4 CHAMBER: 63.9
EJECTION FRACTION: 68 %
LEFT VENTRICLE INTERNAL DIMENSION DIASTOLE: 5.2 CM (ref 3.5–6)
LEFT VENTRICULAR OUTFLOW TRACT DIAMETER: 2.16 CM
MITRAL VALVE E/A RATIO: 0.6
MYCOBACTERIUM SPEC CULT: NORMAL
RIGHT VENTRICLE FREE WALL PEAK S': 11 CM/S
TRICUSPID ANNULAR PLANE SYSTOLIC EXCURSION: 1.8 CM

## 2025-06-20 LAB
LABORATORY COMMENT REPORT: NORMAL
Lab: NORMAL
PATH REPORT.COMMENTS IMP SPEC: NORMAL
PATH REPORT.FINAL DX SPEC: NORMAL
PATH REPORT.GROSS SPEC: NORMAL
PATH REPORT.RELEVANT HX SPEC: NORMAL
PATH REPORT.TOTAL CANCER: NORMAL

## 2025-06-23 LAB
FUNGUS SPEC CULT: NORMAL
FUNGUS SPEC FUNGUS STN: NORMAL

## 2025-06-25 ENCOUNTER — HOSPITAL ENCOUNTER (OUTPATIENT)
Dept: RADIOLOGY | Facility: HOSPITAL | Age: 75
Discharge: HOME | End: 2025-06-25
Payer: MEDICARE

## 2025-06-25 ENCOUNTER — OFFICE VISIT (OUTPATIENT)
Dept: SURGERY | Facility: HOSPITAL | Age: 75
End: 2025-06-25
Payer: MEDICARE

## 2025-06-25 VITALS
WEIGHT: 191.14 LBS | TEMPERATURE: 96.8 F | OXYGEN SATURATION: 95 % | SYSTOLIC BLOOD PRESSURE: 108 MMHG | DIASTOLIC BLOOD PRESSURE: 49 MMHG | BODY MASS INDEX: 26.66 KG/M2 | HEART RATE: 77 BPM | RESPIRATION RATE: 16 BRPM

## 2025-06-25 DIAGNOSIS — R91.1 LUNG NODULE: ICD-10-CM

## 2025-06-25 LAB
ACID FAST STN SPEC: NORMAL
MYCOBACTERIUM SPEC CULT: NORMAL

## 2025-06-25 PROCEDURE — 99211 OFF/OP EST MAY X REQ PHY/QHP: CPT | Mod: 25 | Performed by: THORACIC SURGERY (CARDIOTHORACIC VASCULAR SURGERY)

## 2025-06-25 PROCEDURE — 1036F TOBACCO NON-USER: CPT | Performed by: THORACIC SURGERY (CARDIOTHORACIC VASCULAR SURGERY)

## 2025-06-25 PROCEDURE — 1126F AMNT PAIN NOTED NONE PRSNT: CPT | Performed by: THORACIC SURGERY (CARDIOTHORACIC VASCULAR SURGERY)

## 2025-06-25 PROCEDURE — 1159F MED LIST DOCD IN RCRD: CPT | Performed by: THORACIC SURGERY (CARDIOTHORACIC VASCULAR SURGERY)

## 2025-06-25 PROCEDURE — 3078F DIAST BP <80 MM HG: CPT | Performed by: THORACIC SURGERY (CARDIOTHORACIC VASCULAR SURGERY)

## 2025-06-25 PROCEDURE — 3074F SYST BP LT 130 MM HG: CPT | Performed by: THORACIC SURGERY (CARDIOTHORACIC VASCULAR SURGERY)

## 2025-06-25 PROCEDURE — 71046 X-RAY EXAM CHEST 2 VIEWS: CPT | Performed by: RADIOLOGY

## 2025-06-25 PROCEDURE — 1111F DSCHRG MED/CURRENT MED MERGE: CPT | Performed by: THORACIC SURGERY (CARDIOTHORACIC VASCULAR SURGERY)

## 2025-06-25 PROCEDURE — 71046 X-RAY EXAM CHEST 2 VIEWS: CPT

## 2025-06-25 ASSESSMENT — PAIN SCALES - GENERAL: PAINLEVEL_OUTOF10: 0-NO PAIN

## 2025-06-25 NOTE — PROGRESS NOTES
Mr. 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  Final pathology showed a squamous epithelial lined cyst.  He has recovered uneventfully.     On exam his incision is healing well.    His CXR today is unremarkable    Surgical Pathology Exam: [id number]  Order: [id number]   Collected 6/12/2025 08:46       Status: Final result       Dx: Lung nodule    Test Result Released: Yes (not seen)    0 Result Notes      Component    FINAL DIAGNOSIS   A. LUNG, LEFT UPPER LOBE; WEDGE RESECTION:  -- Lung parenchyma with benign squamous epithelium lined cyst (1.5 cm)          Mr Roman is recovering well.  He will follow up with Dr. Wright.

## 2025-06-30 LAB
FUNGUS SPEC CULT: NORMAL
FUNGUS SPEC FUNGUS STN: NORMAL

## 2025-07-02 LAB
ACID FAST STN SPEC: NORMAL
MYCOBACTERIUM SPEC CULT: NORMAL

## 2025-07-09 LAB
ACID FAST STN SPEC: NORMAL
MYCOBACTERIUM SPEC CULT: NORMAL

## 2025-07-10 ENCOUNTER — APPOINTMENT (OUTPATIENT)
Dept: PRIMARY CARE | Facility: CLINIC | Age: 75
End: 2025-07-10
Payer: MEDICARE

## 2025-07-10 DIAGNOSIS — R73.09 ELEVATED GLUCOSE: ICD-10-CM

## 2025-07-10 DIAGNOSIS — E03.9 HYPOTHYROIDISM, UNSPECIFIED TYPE: ICD-10-CM

## 2025-07-10 DIAGNOSIS — F33.1 MAJOR DEPRESSIVE DISORDER, RECURRENT, MODERATE: ICD-10-CM

## 2025-07-10 DIAGNOSIS — N18.31 STAGE 3A CHRONIC KIDNEY DISEASE (MULTI): ICD-10-CM

## 2025-07-10 DIAGNOSIS — I63.531: Primary | ICD-10-CM

## 2025-07-10 DIAGNOSIS — E78.5 HYPERLIPIDEMIA, UNSPECIFIED HYPERLIPIDEMIA TYPE: ICD-10-CM

## 2025-07-10 DIAGNOSIS — I10 ESSENTIAL HYPERTENSION: ICD-10-CM

## 2025-07-10 PROCEDURE — G2211 COMPLEX E/M VISIT ADD ON: HCPCS | Performed by: INTERNAL MEDICINE

## 2025-07-10 PROCEDURE — 99214 OFFICE O/P EST MOD 30 MIN: CPT | Performed by: INTERNAL MEDICINE

## 2025-07-10 PROCEDURE — 1159F MED LIST DOCD IN RCRD: CPT | Performed by: INTERNAL MEDICINE

## 2025-07-10 PROCEDURE — 3075F SYST BP GE 130 - 139MM HG: CPT | Performed by: INTERNAL MEDICINE

## 2025-07-10 PROCEDURE — 1111F DSCHRG MED/CURRENT MED MERGE: CPT | Performed by: INTERNAL MEDICINE

## 2025-07-10 PROCEDURE — 1036F TOBACCO NON-USER: CPT | Performed by: INTERNAL MEDICINE

## 2025-07-10 PROCEDURE — 3078F DIAST BP <80 MM HG: CPT | Performed by: INTERNAL MEDICINE

## 2025-07-10 PROCEDURE — 1160F RVW MEDS BY RX/DR IN RCRD: CPT | Performed by: INTERNAL MEDICINE

## 2025-07-10 NOTE — PROGRESS NOTES
"Subjective   Reason for Visit: Juan Jose Roman is an 75 y.o. male here for f/u      Past Medical, Surgical, and Family History reviewed and updated in chart.         HPI    Overall well  Note 12/13/24 reviewed  Continues with issues of pain leg and hip.  Some debate whether this represents spinal stenosis versus localized bursitis.  Working with orthopedics as well as pain medicine.    Underwent resection of lung nodule-small increasing size nodule with moderate PET avidity.  Benign on biopsy.  No complications of procedure.  No pain.    Recently dx w/ RAHUL, --> CPAP pending via neurology.  Unclear severity-I do not have results.  #1 COPD- no issuesw, perhaps \"better\"  #2 CAD- no CP, SOB  #3 CVA- remote.  No new issues.  Continues to follow with neurology  #4 lipids-on increased treatment without difficulty-increased per neurology to 80 mg atorvastatin.  #5 depression/anxiety-mood overall good.  Denies SI/HI  #6 insomnia-continues to use melatonin regularly.  Sleeping fairly well  #7 IFBS-no polyuria/polydipsia.  #8 CKD3-no edema.  Good urine output.  #9 subclinical hypothyroid-weight stable.  No heat/cold intolerance.  #10 hypertension-no home blood pressure checks   #11 GERD-controlled.  No symptoms recently.  No difficulty swallowing.    Patient Care Team:  Kellee Chowdhury MD as PCP - General (Internal Medicine)  FIONA Garland-CNP as PCP - [facility] Attributed Provider  Luke Wright MD as Surgeon (Pulmonary Disease)  Fitz Love MD as Consulting Physician (Cardiology)     Review of Systems  All systems reviewed and negative except as per history of present illness  Objective   Vitals:  /70 (BP Location: Left arm, Patient Position: Sitting)   Wt 87.5 kg (192 lb 12.8 oz)   BMI 26.89 kg/m²       Physical Exam  Constitutional:       Appearance: Normal appearance.   Cardiovascular:      Rate and Rhythm: Normal rate and regular rhythm.      Pulses: Normal pulses.      Heart sounds: No murmur " heard.     No gallop.   Pulmonary:      Effort: Pulmonary effort is normal. No respiratory distress.      Breath sounds: Normal breath sounds. No wheezing, rhonchi or rales.   Neurological:      Mental Status: He is alert.   Psychiatric:         Mood and Affect: Mood normal.         Behavior: Behavior normal.         Thought Content: Thought content normal.         Judgment: Judgment normal.           Lab Results   Component Value Date    WBC 12.0 (H) 06/13/2025    HGB 14.5 06/13/2025    HCT 45.5 06/13/2025     06/13/2025    CHOL 141 12/18/2024    TRIG 91 12/18/2024    HDL 58.8 12/18/2024    ALT 27 06/02/2025    AST 23 06/02/2025     06/13/2025    K 3.7 06/13/2025     06/13/2025    CREATININE 1.37 (H) 06/13/2025    BUN 25 (H) 06/13/2025    CO2 23 06/13/2025    TSH 1.87 06/02/2025    PSA 2.37 08/27/2024    INR 1.1 06/02/2025    HGBA1C 5.5 12/18/2024       Assessment & Plan  Cerebral infarction due to occlusion of right posterior cerebral artery (Multi)    Orders:    Consult to Interventional Radiology; Future    Major depressive disorder, recurrent, moderate         Stage 3a chronic kidney disease (Multi)    Orders:    Albumin-Creatinine Ratio, Urine Random; Future    Hypothyroidism, unspecified type         Hyperlipidemia, unspecified hyperlipidemia type    Orders:    Alanine Aminotransferase; Future    Lipid Panel; Future    Essential hypertension    Orders:    Basic Metabolic Panel; Future    Elevated glucose    Orders:    Hemoglobin A1C; Future                    Spinal stenosis      #1 COPD-stable.  F/u  dr Wright/pulmonology.    #2 CAD-clinically stable.  F/u cardiology (dr Love/Caro Alejandra.)  #3 CVA- remote.  Stable.  F/u  neuro/cards  #4 lipids-on target.  Continue statin  #5 depression/anxiety-good.  F/u psychiatry  #6 insomnia-stable f/u  w/ psychiatry. Con't melatonin.      #7 IFBS-continue to focus on a reduced sugar/carbohydrate diet.  Recheck A1c next visit  #8 CKD3-stable.   Recheck.  #9 subclinical hypothyroid-euthyroid.  Recheck 6 to 12 months  #10 hypertension-good.  Continue treatment  #11 GERD- s/p remote EGD (2-3 yrs ptv???).   Follow-up with GI.  #12 seasonal allergic rhinitis-significant.  Continue current treatment.  #13 hip DJD/spinal stenosis-some confusion about primary cause of symptoms.  Follow-up spine Ortho as well as pain medicine  #14 bph/ED- on Cialis.  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  Patient was identified as a fall risk. Risk prevention instructions provided.  f/u  6 mths

## 2025-07-11 VITALS — WEIGHT: 192.8 LBS | DIASTOLIC BLOOD PRESSURE: 70 MMHG | BODY MASS INDEX: 26.89 KG/M2 | SYSTOLIC BLOOD PRESSURE: 132 MMHG

## 2025-07-11 PROBLEM — I47.20 VENTRICULAR TACHYCARDIA (MULTI): Status: RESOLVED | Noted: 2023-08-01 | Resolved: 2025-07-11

## 2025-07-11 PROBLEM — F33.1 MAJOR DEPRESSIVE DISORDER, RECURRENT, MODERATE: Status: ACTIVE | Noted: 2025-07-11

## 2025-07-11 PROBLEM — G83.14: Status: RESOLVED | Noted: 2023-08-01 | Resolved: 2025-07-11

## 2025-07-16 LAB
ACID FAST STN SPEC: NORMAL
MYCOBACTERIUM SPEC CULT: NORMAL

## 2025-07-23 LAB
ACID FAST STN SPEC: NORMAL
MYCOBACTERIUM SPEC CULT: NORMAL

## 2025-07-29 ENCOUNTER — HOSPITAL ENCOUNTER (OUTPATIENT)
Dept: RADIOLOGY | Facility: EXTERNAL LOCATION | Age: 75
Discharge: HOME | End: 2025-07-29

## 2025-07-30 LAB
ACID FAST STN SPEC: NORMAL
MYCOBACTERIUM SPEC CULT: NORMAL

## 2025-08-05 LAB
ACID FAST STN SPEC: NORMAL
MYCOBACTERIUM SPEC CULT: NORMAL

## 2025-08-07 ENCOUNTER — OFFICE VISIT (OUTPATIENT)
Dept: NEUROSURGERY | Facility: CLINIC | Age: 75
End: 2025-08-07
Payer: MEDICARE

## 2025-08-07 VITALS
BODY MASS INDEX: 27.3 KG/M2 | SYSTOLIC BLOOD PRESSURE: 120 MMHG | HEIGHT: 71 IN | HEART RATE: 61 BPM | WEIGHT: 195 LBS | TEMPERATURE: 97.5 F | DIASTOLIC BLOOD PRESSURE: 64 MMHG

## 2025-08-07 DIAGNOSIS — G45.0 VERTEBROBASILAR ARTERY INSUFFICIENCY: Primary | ICD-10-CM

## 2025-08-07 PROCEDURE — 1126F AMNT PAIN NOTED NONE PRSNT: CPT | Performed by: NURSE PRACTITIONER

## 2025-08-07 PROCEDURE — 99203 OFFICE O/P NEW LOW 30 MIN: CPT | Performed by: NURSE PRACTITIONER

## 2025-08-07 PROCEDURE — 3078F DIAST BP <80 MM HG: CPT | Performed by: NURSE PRACTITIONER

## 2025-08-07 PROCEDURE — 99212 OFFICE O/P EST SF 10 MIN: CPT | Performed by: NURSE PRACTITIONER

## 2025-08-07 PROCEDURE — 3074F SYST BP LT 130 MM HG: CPT | Performed by: NURSE PRACTITIONER

## 2025-08-07 PROCEDURE — 1159F MED LIST DOCD IN RCRD: CPT | Performed by: NURSE PRACTITIONER

## 2025-08-07 ASSESSMENT — PAIN SCALES - GENERAL: PAINLEVEL_OUTOF10: 0-NO PAIN

## 2025-08-13 ENCOUNTER — MULTIDISCIPLINARY MEETING (OUTPATIENT)
Dept: NEUROSURGERY | Facility: HOSPITAL | Age: 75
End: 2025-08-13
Payer: MEDICARE

## 2025-08-25 ENCOUNTER — TELEPHONE (OUTPATIENT)
Dept: PRIMARY CARE | Facility: CLINIC | Age: 75
End: 2025-08-25
Payer: MEDICARE

## 2025-08-25 DIAGNOSIS — I10 ESSENTIAL HYPERTENSION: ICD-10-CM

## 2025-08-25 RX ORDER — SPIRONOLACTONE 25 MG/1
25 TABLET ORAL DAILY
Qty: 90 TABLET | Refills: 3 | Status: SHIPPED | OUTPATIENT
Start: 2025-08-25 | End: 2026-08-25

## 2025-09-11 ENCOUNTER — APPOINTMENT (OUTPATIENT)
Dept: PRIMARY CARE | Facility: CLINIC | Age: 75
End: 2025-09-11
Payer: MEDICARE

## 2025-12-08 ENCOUNTER — APPOINTMENT (OUTPATIENT)
Dept: PRIMARY CARE | Facility: CLINIC | Age: 75
End: 2025-12-08
Payer: MEDICARE

## (undated) DEVICE — SUTURE, VICRYL, 2-0, 27 IN, UR-6, VIOLET

## (undated) DEVICE — DRESSING, GAUZE, PETROLATUM, VASELINE, 3 X 9 IN, STERILE

## (undated) DEVICE — CATHETER TRAY, SURESTEP, 16FR, URINE METER W/STATLOCK

## (undated) DEVICE — CARE KIT, LAPAROSCOPIC, ADVANCED

## (undated) DEVICE — Device

## (undated) DEVICE — MANIFOLD, 4 PORT NEPTUNE STANDARD

## (undated) DEVICE — CATHETER, URETHRAL, ROBNEL,  8 FR, 16 IN, LF, RED

## (undated) DEVICE — STRIP, SKIN CLOSURE, STERI STRIP, REINFORCED, 0.5 X 4 IN

## (undated) DEVICE — DRAPE, TIBURON, SPLIT SHEET, REINF ADH STRIP, 77X122

## (undated) DEVICE — SPONGE, DISSECTOR, PEANUT, 3/8, STERILE 5 FOAM HOLDER"

## (undated) DEVICE — DRESSING, ISLAND, TELFA, 4 X 5 IN

## (undated) DEVICE — SHEET, SPLIT, CARDIOVASCULAR TIBURON

## (undated) DEVICE — SUTURE, SILK, 2-0, 30 IN, SH, BLACK

## (undated) DEVICE — SURGISLEEVE, WOUND PROTECTOR, SMALL 2.5-6CM

## (undated) DEVICE — COVER, CART, 45 X 27 X 48 IN, CLEAR

## (undated) DEVICE — SUTURE, PROLENE, 1, 30 IN, CT-1, BLUE

## (undated) DEVICE — STAPLER, ECHELON 3000, 60MM STD

## (undated) DEVICE — ANTIFOG, SOLUTION, FOG-OUT

## (undated) DEVICE — COVER, LIGHT HANDLE, SURGICAL, FLEXIBLE, DISPOSABLE, STERILE

## (undated) DEVICE — SUTURE, SILK, 0, 24 IN, SUTUPAK, BLACK

## (undated) DEVICE — STAPLER,  LINEAR ENDO 60MM RELOAD, BLACK, DISP

## (undated) DEVICE — COLLECTION UNIT, DRAINAGE, THORACIC, SINGLE TUBE, DRY SUCTION, ATS COMPATIBLE, OASIS 3600, LF

## (undated) DEVICE — ROLL, DENTAL, 3/8 X 1-1/2, STERILE, 5/PK

## (undated) DEVICE — DRESSING, ADHESIVE, ISLAND, TELFA, 2 X 3.75 IN, LF

## (undated) DEVICE — DRAPE, INCISE, ANTIMICROBIAL, IOBAN 2, LARGE, 17 X 23 IN, DISPOSABLE, STERILE

## (undated) DEVICE — SUTURE, MONOCRYL, 3-0, 18 IN, PS2, UNDYED

## (undated) DEVICE — SUTURE, VICRYL, 2-0, 36 IN, CT-1, UNDYED

## (undated) DEVICE — COVER, TABLE, UHC

## (undated) DEVICE — TUBING, SUCTION, CONNECTING, STERILE 0.25 X 120 IN., LF